# Patient Record
Sex: FEMALE | Race: BLACK OR AFRICAN AMERICAN | Employment: UNEMPLOYED
[De-identification: names, ages, dates, MRNs, and addresses within clinical notes are randomized per-mention and may not be internally consistent; named-entity substitution may affect disease eponyms.]

---

## 2017-01-16 ENCOUNTER — OFFICE VISIT (OUTPATIENT)
Dept: INTERNAL MEDICINE | Facility: CLINIC | Age: 37
End: 2017-01-16

## 2017-01-16 VITALS
DIASTOLIC BLOOD PRESSURE: 70 MMHG | WEIGHT: 106 LBS | HEART RATE: 55 BPM | SYSTOLIC BLOOD PRESSURE: 103 MMHG | HEIGHT: 62 IN | BODY MASS INDEX: 19.51 KG/M2

## 2017-01-16 DIAGNOSIS — K21.9 GASTROESOPHAGEAL REFLUX DISEASE WITHOUT ESOPHAGITIS: ICD-10-CM

## 2017-01-16 DIAGNOSIS — Z23 NEED FOR PROPHYLACTIC VACCINATION AGAINST STREPTOCOCCUS PNEUMONIAE (PNEUMOCOCCUS): ICD-10-CM

## 2017-01-16 DIAGNOSIS — K58.9 IRRITABLE BOWEL SYNDROME WITHOUT DIARRHEA: ICD-10-CM

## 2017-01-16 DIAGNOSIS — Z23 NEED FOR PROPHYLACTIC VACCINATION AND INOCULATION AGAINST INFLUENZA: ICD-10-CM

## 2017-01-16 DIAGNOSIS — R56.01 COMPLEX FEBRILE CONVULSION (HCC): Primary | ICD-10-CM

## 2017-01-16 PROCEDURE — G0009 ADMIN PNEUMOCOCCAL VACCINE: HCPCS | Performed by: INTERNAL MEDICINE

## 2017-01-16 PROCEDURE — G0008 ADMIN INFLUENZA VIRUS VAC: HCPCS | Performed by: INTERNAL MEDICINE

## 2017-01-16 PROCEDURE — 99214 OFFICE O/P EST MOD 30 MIN: CPT | Performed by: INTERNAL MEDICINE

## 2017-01-16 PROCEDURE — 90732 PPSV23 VACC 2 YRS+ SUBQ/IM: CPT | Performed by: INTERNAL MEDICINE

## 2017-01-16 PROCEDURE — 90688 IIV4 VACCINE SPLT 0.5 ML IM: CPT | Performed by: INTERNAL MEDICINE

## 2017-01-16 RX ORDER — LEVETIRACETAM 750 MG/1
750 TABLET ORAL 2 TIMES DAILY
Qty: 60 TABLET | Refills: 2 | Status: SHIPPED | OUTPATIENT
Start: 2017-01-16 | End: 2017-04-17 | Stop reason: SDUPTHER

## 2017-01-16 RX ORDER — FAMOTIDINE 20 MG/1
20 TABLET, FILM COATED ORAL 2 TIMES DAILY
Qty: 60 TABLET | Refills: 2 | Status: SHIPPED | OUTPATIENT
Start: 2017-01-16 | End: 2017-04-17 | Stop reason: SDUPTHER

## 2017-01-16 RX ORDER — DICYCLOMINE HCL 20 MG
20 TABLET ORAL EVERY 6 HOURS
Qty: 120 TABLET | Refills: 2 | Status: SHIPPED | OUTPATIENT
Start: 2017-01-16 | End: 2017-04-17 | Stop reason: SDUPTHER

## 2017-04-17 ENCOUNTER — OFFICE VISIT (OUTPATIENT)
Dept: INTERNAL MEDICINE | Age: 37
End: 2017-04-17
Payer: COMMERCIAL

## 2017-04-17 VITALS
HEIGHT: 62 IN | HEART RATE: 60 BPM | DIASTOLIC BLOOD PRESSURE: 74 MMHG | BODY MASS INDEX: 19.43 KG/M2 | SYSTOLIC BLOOD PRESSURE: 107 MMHG | WEIGHT: 105.6 LBS

## 2017-04-17 DIAGNOSIS — K21.9 GASTROESOPHAGEAL REFLUX DISEASE WITHOUT ESOPHAGITIS: ICD-10-CM

## 2017-04-17 DIAGNOSIS — K58.9 IRRITABLE BOWEL SYNDROME WITHOUT DIARRHEA: ICD-10-CM

## 2017-04-17 DIAGNOSIS — R56.01 COMPLEX FEBRILE CONVULSION (HCC): ICD-10-CM

## 2017-04-17 PROCEDURE — 99214 OFFICE O/P EST MOD 30 MIN: CPT | Performed by: INTERNAL MEDICINE

## 2017-04-17 RX ORDER — FAMOTIDINE 20 MG/1
20 TABLET, FILM COATED ORAL 2 TIMES DAILY
Qty: 60 TABLET | Refills: 2 | Status: SHIPPED | OUTPATIENT
Start: 2017-04-17 | End: 2017-07-17 | Stop reason: SDUPTHER

## 2017-04-17 RX ORDER — LEVETIRACETAM 750 MG/1
750 TABLET ORAL 2 TIMES DAILY
Qty: 60 TABLET | Refills: 2 | Status: SHIPPED | OUTPATIENT
Start: 2017-04-17 | End: 2017-07-17 | Stop reason: SDUPTHER

## 2017-04-17 RX ORDER — DICYCLOMINE HCL 20 MG
20 TABLET ORAL EVERY 6 HOURS
Qty: 120 TABLET | Refills: 2 | Status: SHIPPED | OUTPATIENT
Start: 2017-04-17 | End: 2017-07-17 | Stop reason: ALTCHOICE

## 2017-04-17 ASSESSMENT — PATIENT HEALTH QUESTIONNAIRE - PHQ9
SUM OF ALL RESPONSES TO PHQ QUESTIONS 1-9: 1
2. FEELING DOWN, DEPRESSED OR HOPELESS: 1
SUM OF ALL RESPONSES TO PHQ9 QUESTIONS 1 & 2: 1
1. LITTLE INTEREST OR PLEASURE IN DOING THINGS: 0

## 2017-07-17 ENCOUNTER — OFFICE VISIT (OUTPATIENT)
Dept: INTERNAL MEDICINE | Age: 37
End: 2017-07-17
Payer: COMMERCIAL

## 2017-07-17 ENCOUNTER — HOSPITAL ENCOUNTER (OUTPATIENT)
Age: 37
Setting detail: SPECIMEN
Discharge: HOME | End: 2017-07-17
Payer: COMMERCIAL

## 2017-07-17 VITALS
DIASTOLIC BLOOD PRESSURE: 73 MMHG | SYSTOLIC BLOOD PRESSURE: 117 MMHG | HEART RATE: 69 BPM | WEIGHT: 108 LBS | BODY MASS INDEX: 19.88 KG/M2 | HEIGHT: 62 IN

## 2017-07-17 DIAGNOSIS — K21.9 GASTROESOPHAGEAL REFLUX DISEASE WITHOUT ESOPHAGITIS: ICD-10-CM

## 2017-07-17 DIAGNOSIS — Z11.4 SCREENING FOR HIV WITHOUT PRESENCE OF RISK FACTORS: ICD-10-CM

## 2017-07-17 DIAGNOSIS — R56.01 COMPLEX FEBRILE CONVULSION (HCC): Primary | ICD-10-CM

## 2017-07-17 DIAGNOSIS — E55.9 VITAMIN D DEFICIENCY: ICD-10-CM

## 2017-07-17 LAB — HIV AG/AB: NONREACTIVE

## 2017-07-17 PROCEDURE — 36415 COLL VENOUS BLD VENIPUNCTURE: CPT

## 2017-07-17 PROCEDURE — 99214 OFFICE O/P EST MOD 30 MIN: CPT | Performed by: INTERNAL MEDICINE

## 2017-07-17 PROCEDURE — 87389 HIV-1 AG W/HIV-1&-2 AB AG IA: CPT

## 2017-07-17 RX ORDER — LEVETIRACETAM 750 MG/1
750 TABLET ORAL 2 TIMES DAILY
Qty: 60 TABLET | Refills: 2 | Status: SHIPPED | OUTPATIENT
Start: 2017-07-17 | End: 2017-10-16 | Stop reason: SDUPTHER

## 2017-07-17 RX ORDER — FAMOTIDINE 20 MG/1
20 TABLET, FILM COATED ORAL 2 TIMES DAILY
Qty: 60 TABLET | Refills: 2 | Status: SHIPPED | OUTPATIENT
Start: 2017-07-17 | End: 2017-10-16 | Stop reason: SDUPTHER

## 2017-07-17 RX ORDER — METHANOL
1 LIQUID (ML) MISCELLANEOUS DAILY
Qty: 30 TABLET | Refills: 2 | Status: SHIPPED | OUTPATIENT
Start: 2017-07-17 | End: 2017-10-16 | Stop reason: SDUPTHER

## 2017-10-16 ENCOUNTER — OFFICE VISIT (OUTPATIENT)
Dept: INTERNAL MEDICINE | Age: 37
End: 2017-10-16
Payer: COMMERCIAL

## 2017-10-16 VITALS
HEIGHT: 62 IN | DIASTOLIC BLOOD PRESSURE: 71 MMHG | HEART RATE: 64 BPM | WEIGHT: 111.6 LBS | SYSTOLIC BLOOD PRESSURE: 115 MMHG | BODY MASS INDEX: 20.54 KG/M2

## 2017-10-16 DIAGNOSIS — R56.01 COMPLEX FEBRILE CONVULSION (HCC): Primary | ICD-10-CM

## 2017-10-16 DIAGNOSIS — E55.9 VITAMIN D DEFICIENCY: ICD-10-CM

## 2017-10-16 DIAGNOSIS — Z23 NEED FOR INFLUENZA VACCINATION: ICD-10-CM

## 2017-10-16 DIAGNOSIS — K21.9 GASTROESOPHAGEAL REFLUX DISEASE WITHOUT ESOPHAGITIS: ICD-10-CM

## 2017-10-16 PROCEDURE — 99214 OFFICE O/P EST MOD 30 MIN: CPT | Performed by: INTERNAL MEDICINE

## 2017-10-16 PROCEDURE — G0008 ADMIN INFLUENZA VIRUS VAC: HCPCS | Performed by: INTERNAL MEDICINE

## 2017-10-16 PROCEDURE — 90688 IIV4 VACCINE SPLT 0.5 ML IM: CPT | Performed by: INTERNAL MEDICINE

## 2017-10-16 RX ORDER — METHANOL
1 LIQUID (ML) MISCELLANEOUS DAILY
Qty: 30 TABLET | Refills: 2 | Status: SHIPPED | OUTPATIENT
Start: 2017-10-16 | End: 2018-02-05 | Stop reason: SDUPTHER

## 2017-10-16 RX ORDER — FAMOTIDINE 20 MG/1
20 TABLET, FILM COATED ORAL 2 TIMES DAILY
Qty: 60 TABLET | Refills: 2 | Status: SHIPPED | OUTPATIENT
Start: 2017-10-16 | End: 2018-02-05 | Stop reason: SDUPTHER

## 2017-10-16 RX ORDER — LEVETIRACETAM 750 MG/1
750 TABLET ORAL 2 TIMES DAILY
Qty: 60 TABLET | Refills: 2 | Status: SHIPPED | OUTPATIENT
Start: 2017-10-16 | End: 2018-01-29 | Stop reason: SDUPTHER

## 2017-10-16 ASSESSMENT — ENCOUNTER SYMPTOMS
NAUSEA: 0
HEMOPTYSIS: 0
COUGH: 0
HEARTBURN: 0
ABDOMINAL PAIN: 0
BLURRED VISION: 0
DOUBLE VISION: 0

## 2017-10-16 NOTE — PROGRESS NOTES
Visit Information    Have you changed or started any medications since your last visit including any over-the-counter medicines, vitamins, or herbal medicines? no   Have you stopped taking any of your medications? Is so, why? -  no  Are you having any side effects from any of your medications? - no    Have you seen any other physician or provider since your last visit?  no   Have you had any other diagnostic tests since your last visit? Yes- labs   Have you been seen in the emergency room and/or had an admission in a hospital since we last saw you?  no   Have you had your routine dental cleaning in the past 6 months?  no     Do you have an active MyChart account? If no, what is the barrier?   No:     Patient Care Team:  Edu Saldaña MD as PCP - General    Medical History Review  Past Medical, Family, and Social History reviewed and does contribute to the patient presenting condition    Health Maintenance   Topic Date Due    Cervical cancer screen  03/07/2017    Flu vaccine (1) 09/01/2017    DTaP/Tdap/Td vaccine (2 - Td) 09/13/2026    HIV screen  Completed

## 2017-10-16 NOTE — PROGRESS NOTES
DIAGNOSTIC FINDINGS:  CBC:  Lab Results   Component Value Date    WBC 5.4 09/07/2016    HGB 13.2 09/07/2016     09/07/2016     03/21/2012       BMP:    Lab Results   Component Value Date     09/07/2016    K 4.1 09/07/2016     09/07/2016    CO2 25 09/07/2016    BUN 15 09/07/2016    CREATININE 0.82 09/07/2016    GLUCOSE 91 09/07/2016    GLUCOSE 118 03/21/2012       HEMOGLOBIN A1C: No results found for: LABA1C    FASTING LIPID PANEL:No results found for: CHOL, HDL, TRIG    ASSESSMENT AND PLAN:  Scarlet was seen today for 3 month follow-up and seizures. Diagnoses and all orders for this visit:    Complex febrile convulsion ([address]  -     levETIRAcetam (KEPPRA) 750 MG tablet; Take 1 tablet by mouth 2 times daily    Gastroesophageal reflux disease without esophagitis  -     famotidine (PEPCID) 20 MG tablet; Take 1 tablet by mouth 2 times daily    Vitamin D deficiency  -     GNP VITAMIN D 1000 units TABS tablet; Take 1 tablet by mouth daily    Need for influenza vaccination  -     INFLUENZA, QUADV, 3 YRS AND OLDER, IM, MDV, 0.5ML (FLUZONE QUADV)  -     [place] ADMIN INFLUENZA VIRUS VAC      FOLLOW UP AND INSTRUCTIONS:  · Return in about 3 months (around 1/16/2018) for Screening PAP. · Scarlet received counseling on the following healthy behaviors: nutrition and exercise    · Discussed use, benefit, and side effects of prescribed medications. Barriers to medication compliance addressed. All patient questions answered. Pt voiced understanding.      · Patient given educational materials - see patient instructions    Edu Snaders MD, RAHEL, [address] [place] [address] Internal Medicine Associate  10/16/2017, 4:39 PM    This note is created with the assistance of a speech-recognition program. While intending to generate a document that actually reflects the content of the visit, the document can still have some mistakes which may not have been identified and corrected by editing.

## 2018-01-29 DIAGNOSIS — R56.01 COMPLEX FEBRILE CONVULSION (HCC): ICD-10-CM

## 2018-01-29 RX ORDER — LEVETIRACETAM 750 MG/1
TABLET ORAL
Qty: 60 TABLET | Refills: 0 | Status: SHIPPED | OUTPATIENT
Start: 2018-01-29 | End: 2018-02-05 | Stop reason: SDUPTHER

## 2018-02-05 ENCOUNTER — OFFICE VISIT (OUTPATIENT)
Dept: INTERNAL MEDICINE | Age: 38
End: 2018-02-05
Payer: COMMERCIAL

## 2018-02-05 ENCOUNTER — HOSPITAL ENCOUNTER (OUTPATIENT)
Age: 38
Setting detail: SPECIMEN
Discharge: HOME | End: 2018-02-05
Payer: COMMERCIAL

## 2018-02-05 VITALS
WEIGHT: 110 LBS | SYSTOLIC BLOOD PRESSURE: 118 MMHG | BODY MASS INDEX: 20.24 KG/M2 | HEIGHT: 62 IN | DIASTOLIC BLOOD PRESSURE: 68 MMHG | HEART RATE: 74 BPM

## 2018-02-05 DIAGNOSIS — N76.0 BV (BACTERIAL VAGINOSIS): ICD-10-CM

## 2018-02-05 DIAGNOSIS — Z12.4 CERVICAL CANCER SCREENING: ICD-10-CM

## 2018-02-05 DIAGNOSIS — E55.9 VITAMIN D DEFICIENCY: ICD-10-CM

## 2018-02-05 DIAGNOSIS — B96.89 BV (BACTERIAL VAGINOSIS): ICD-10-CM

## 2018-02-05 DIAGNOSIS — K21.9 GASTROESOPHAGEAL REFLUX DISEASE WITHOUT ESOPHAGITIS: ICD-10-CM

## 2018-02-05 DIAGNOSIS — R56.01 COMPLEX FEBRILE CONVULSION (HCC): Primary | ICD-10-CM

## 2018-02-05 PROCEDURE — 99214 OFFICE O/P EST MOD 30 MIN: CPT | Performed by: INTERNAL MEDICINE

## 2018-02-05 PROCEDURE — G8484 FLU IMMUNIZE NO ADMIN: HCPCS | Performed by: INTERNAL MEDICINE

## 2018-02-05 PROCEDURE — G8427 DOCREV CUR MEDS BY ELIG CLIN: HCPCS | Performed by: INTERNAL MEDICINE

## 2018-02-05 PROCEDURE — G8420 CALC BMI NORM PARAMETERS: HCPCS | Performed by: INTERNAL MEDICINE

## 2018-02-05 PROCEDURE — 1036F TOBACCO NON-USER: CPT | Performed by: INTERNAL MEDICINE

## 2018-02-05 RX ORDER — METHANOL
1 LIQUID (ML) MISCELLANEOUS DAILY
Qty: 30 TABLET | Refills: 2 | Status: SHIPPED | OUTPATIENT
Start: 2018-02-05 | End: 2018-04-30 | Stop reason: SDUPTHER

## 2018-02-05 RX ORDER — LEVETIRACETAM 750 MG/1
750 TABLET ORAL 2 TIMES DAILY
Qty: 60 TABLET | Refills: 2 | Status: SHIPPED | OUTPATIENT
Start: 2018-02-05 | End: 2018-04-30 | Stop reason: SDUPTHER

## 2018-02-05 RX ORDER — METRONIDAZOLE 500 MG/1
500 TABLET ORAL 2 TIMES DAILY
Qty: 14 TABLET | Refills: 0 | Status: SHIPPED | OUTPATIENT
Start: 2018-02-05 | End: 2018-02-12

## 2018-02-05 RX ORDER — FAMOTIDINE 20 MG/1
20 TABLET, FILM COATED ORAL 2 TIMES DAILY
Qty: 60 TABLET | Refills: 2 | Status: SHIPPED | OUTPATIENT
Start: 2018-02-05 | End: 2018-04-30 | Stop reason: SDUPTHER

## 2018-02-05 ASSESSMENT — ENCOUNTER SYMPTOMS
ABDOMINAL PAIN: 0
COUGH: 0
HEMOPTYSIS: 0
HEARTBURN: 0
DOUBLE VISION: 0
NAUSEA: 0
BLURRED VISION: 0

## 2018-02-05 NOTE — PROGRESS NOTES
Skin: Skin is warm. No rash noted. She is not diaphoretic. No erythema. Psychiatric: Mood, memory, affect and judgment normal.         DIAGNOSTIC FINDINGS:  CBC:  Lab Results   Component Value Date    WBC 5.4 09/07/2016    HGB 13.2 09/07/2016     09/07/2016     03/21/2012       BMP:    Lab Results   Component Value Date     09/07/2016    K 4.1 09/07/2016     09/07/2016    CO2 25 09/07/2016    BUN 15 09/07/2016    CREATININE 0.82 09/07/2016    GLUCOSE 91 09/07/2016    GLUCOSE 118 03/21/2012       ASSESSMENT AND PLAN:  April Strickland was seen today for gynecologic exam.    Diagnoses and all orders for this visit:    Complex febrile convulsion ([address]  -     levETIRAcetam (KEPPRA) 750 MG tablet; Take 1 tablet by mouth 2 times daily    Gastroesophageal reflux disease without esophagitis  -     famotidine (PEPCID) 20 MG tablet; Take 1 tablet by mouth 2 times daily    Vitamin D deficiency  -     GNP VITAMIN D 1000 units TABS tablet; Take 1 tablet by mouth daily    Cervical cancer screening  -     PAP Smear    BV (bacterial vaginosis)  -     metroNIDAZOLE (FLAGYL) 500 MG tablet; Take 1 tablet by mouth 2 times daily for 7 days      FOLLOW UP AND INSTRUCTIONS:  · No Follow-up on file. · [place] received counseling on the following healthy behaviors: nutrition and exercise    · Discussed use, benefit, and side effects of prescribed medications. Barriers to medication compliance addressed. All patient questions answered. Pt voiced understanding.      · Patient given educational materials - see patient instructions    Edu Grant MD, RAHEL, [address] [place] [address] Internal Medicine Associate  2/5/2018, 5:00 PM    This note is created with the assistance of a speech-recognition program. While intending to generate a document that actually reflects the content of the visit, the document can still have some mistakes which may not have been identified and corrected by editing.

## 2018-02-14 LAB — CYTOLOGY REPORT: NORMAL

## 2018-04-30 ENCOUNTER — OFFICE VISIT (OUTPATIENT)
Dept: INTERNAL MEDICINE | Age: 38
End: 2018-04-30
Payer: COMMERCIAL

## 2018-04-30 VITALS
DIASTOLIC BLOOD PRESSURE: 78 MMHG | BODY MASS INDEX: 19.95 KG/M2 | HEIGHT: 62 IN | SYSTOLIC BLOOD PRESSURE: 120 MMHG | WEIGHT: 108.4 LBS | HEART RATE: 86 BPM

## 2018-04-30 DIAGNOSIS — E55.9 VITAMIN D DEFICIENCY: ICD-10-CM

## 2018-04-30 DIAGNOSIS — K21.9 GASTROESOPHAGEAL REFLUX DISEASE WITHOUT ESOPHAGITIS: ICD-10-CM

## 2018-04-30 DIAGNOSIS — R56.01 COMPLEX FEBRILE CONVULSION (HCC): Primary | ICD-10-CM

## 2018-04-30 PROCEDURE — G8427 DOCREV CUR MEDS BY ELIG CLIN: HCPCS | Performed by: INTERNAL MEDICINE

## 2018-04-30 PROCEDURE — 99214 OFFICE O/P EST MOD 30 MIN: CPT | Performed by: INTERNAL MEDICINE

## 2018-04-30 PROCEDURE — 1036F TOBACCO NON-USER: CPT | Performed by: INTERNAL MEDICINE

## 2018-04-30 PROCEDURE — G8420 CALC BMI NORM PARAMETERS: HCPCS | Performed by: INTERNAL MEDICINE

## 2018-04-30 PROCEDURE — 99213 OFFICE O/P EST LOW 20 MIN: CPT

## 2018-04-30 RX ORDER — METRONIDAZOLE 500 MG/1
500 TABLET ORAL 2 TIMES DAILY
COMMUNITY
End: 2018-04-30

## 2018-04-30 RX ORDER — METHANOL
1 LIQUID (ML) MISCELLANEOUS DAILY
Qty: 30 TABLET | Refills: 2 | Status: SHIPPED | OUTPATIENT
Start: 2018-04-30 | End: 2018-08-29 | Stop reason: SDUPTHER

## 2018-04-30 RX ORDER — FAMOTIDINE 20 MG/1
20 TABLET, FILM COATED ORAL 2 TIMES DAILY
Qty: 60 TABLET | Refills: 2 | Status: SHIPPED | OUTPATIENT
Start: 2018-04-30 | End: 2018-07-30 | Stop reason: SDUPTHER

## 2018-04-30 RX ORDER — LEVETIRACETAM 750 MG/1
750 TABLET ORAL 2 TIMES DAILY
Qty: 60 TABLET | Refills: 2 | Status: SHIPPED | OUTPATIENT
Start: 2018-04-30 | End: 2018-07-30 | Stop reason: SDUPTHER

## 2018-04-30 RX ORDER — METRONIDAZOLE 500 MG/1
500 TABLET ORAL 2 TIMES DAILY
Status: CANCELLED | OUTPATIENT
Start: 2018-04-30

## 2018-04-30 ASSESSMENT — ENCOUNTER SYMPTOMS
NAUSEA: 0
HEMOPTYSIS: 0
COUGH: 0
HEARTBURN: 0
DOUBLE VISION: 0
ABDOMINAL PAIN: 0
BLURRED VISION: 0

## 2018-04-30 ASSESSMENT — PATIENT HEALTH QUESTIONNAIRE - PHQ9
SUM OF ALL RESPONSES TO PHQ9 QUESTIONS 1 & 2: 0
1. LITTLE INTEREST OR PLEASURE IN DOING THINGS: 0
SUM OF ALL RESPONSES TO PHQ QUESTIONS 1-9: 0
2. FEELING DOWN, DEPRESSED OR HOPELESS: 0

## 2018-07-30 DIAGNOSIS — K21.9 GASTROESOPHAGEAL REFLUX DISEASE WITHOUT ESOPHAGITIS: ICD-10-CM

## 2018-07-30 DIAGNOSIS — R56.01 COMPLEX FEBRILE CONVULSION (HCC): ICD-10-CM

## 2018-07-30 RX ORDER — FAMOTIDINE 20 MG/1
TABLET, FILM COATED ORAL
Qty: 60 TABLET | Refills: 0 | Status: CANCELLED | OUTPATIENT
Start: 2018-07-30

## 2018-07-30 RX ORDER — FAMOTIDINE 20 MG/1
20 TABLET, FILM COATED ORAL 2 TIMES DAILY
Qty: 60 TABLET | Refills: 2 | Status: SHIPPED | OUTPATIENT
Start: 2018-07-30 | End: 2018-08-29 | Stop reason: SDUPTHER

## 2018-07-30 RX ORDER — LEVETIRACETAM 750 MG/1
TABLET ORAL
Qty: 60 TABLET | Refills: 0 | Status: SHIPPED | OUTPATIENT
Start: 2018-07-30 | End: 2018-08-29 | Stop reason: SDUPTHER

## 2018-07-30 NOTE — TELEPHONE ENCOUNTER
Refill request for pepcid and keppra    Next Visit Date:  Future Appointments  Date Time Provider Rachel Adkins   8/29/2018 2:15 PM Edu Wilson MD [facility] Maintenance   Topic Date Due    Flu vaccine (1) 09/01/2018    Cervical cancer screen  02/05/2021    DTaP/Tdap/Td vaccine (2 - Td) 09/13/2026    HIV screen  Completed       No results found for: LABA1C          ( goal A1C is < 7)   No results found for: LABMICR  No results found for: LDLCHOLESTEROL, LDLCALC    (goal LDL is <100)   BUN (mg/dL)   Date Value   09/07/2016 15     BP Readings from Last 3 Encounters:   04/30/18 120/78   02/05/18 118/68   10/16/17 115/71          (goal 120/80)    All Future Testing planned in [facility]              Patient Active Problem List:     GERD (gastroesophageal reflux disease)     Mental retardation     Seizure ([address]     Irritable bowel syndrome without diarrhea

## 2018-08-29 ENCOUNTER — OFFICE VISIT (OUTPATIENT)
Dept: INTERNAL MEDICINE | Age: 38
End: 2018-08-29
Payer: COMMERCIAL

## 2018-08-29 VITALS
DIASTOLIC BLOOD PRESSURE: 61 MMHG | BODY MASS INDEX: 20.35 KG/M2 | SYSTOLIC BLOOD PRESSURE: 91 MMHG | HEART RATE: 82 BPM | HEIGHT: 62 IN | WEIGHT: 110.6 LBS

## 2018-08-29 DIAGNOSIS — R56.01 COMPLEX FEBRILE CONVULSION (HCC): Primary | ICD-10-CM

## 2018-08-29 DIAGNOSIS — K21.9 GASTROESOPHAGEAL REFLUX DISEASE WITHOUT ESOPHAGITIS: ICD-10-CM

## 2018-08-29 DIAGNOSIS — E55.9 VITAMIN D DEFICIENCY: ICD-10-CM

## 2018-08-29 PROCEDURE — 99212 OFFICE O/P EST SF 10 MIN: CPT | Performed by: INTERNAL MEDICINE

## 2018-08-29 PROCEDURE — 1036F TOBACCO NON-USER: CPT | Performed by: INTERNAL MEDICINE

## 2018-08-29 PROCEDURE — 99214 OFFICE O/P EST MOD 30 MIN: CPT | Performed by: INTERNAL MEDICINE

## 2018-08-29 PROCEDURE — G8420 CALC BMI NORM PARAMETERS: HCPCS | Performed by: INTERNAL MEDICINE

## 2018-08-29 PROCEDURE — G8427 DOCREV CUR MEDS BY ELIG CLIN: HCPCS | Performed by: INTERNAL MEDICINE

## 2018-08-29 RX ORDER — METHANOL
1 LIQUID (ML) MISCELLANEOUS DAILY
Qty: 30 TABLET | Refills: 5 | Status: SHIPPED | OUTPATIENT
Start: 2018-08-29 | End: 2019-02-25 | Stop reason: SDUPTHER

## 2018-08-29 RX ORDER — FAMOTIDINE 20 MG/1
20 TABLET, FILM COATED ORAL 2 TIMES DAILY
Qty: 60 TABLET | Refills: 5 | Status: SHIPPED | OUTPATIENT
Start: 2018-08-29 | End: 2019-05-30 | Stop reason: SDUPTHER

## 2018-08-29 RX ORDER — LEVETIRACETAM 750 MG/1
750 TABLET ORAL 2 TIMES DAILY
Qty: 60 TABLET | Refills: 5 | Status: SHIPPED | OUTPATIENT
Start: 2018-08-29 | End: 2019-02-25 | Stop reason: SDUPTHER

## 2018-08-29 ASSESSMENT — ENCOUNTER SYMPTOMS
ABDOMINAL PAIN: 0
NAUSEA: 0
HEARTBURN: 0
HEMOPTYSIS: 0
COUGH: 0
DOUBLE VISION: 0
BLURRED VISION: 0

## 2018-08-29 NOTE — PROGRESS NOTES
Family History   Problem Relation Age of Onset    High Cholesterol Mother     Birth Defects Neg Hx     Diabetes Neg Hx         REVIEW OF SYSTEMS:  Review of Systems   Constitutional: Negative for chills and fever. HENT: Negative for congestion, ear discharge and nosebleeds. Eyes: Negative for blurred vision and double vision. Respiratory: Negative for cough and hemoptysis. Cardiovascular: Negative for chest pain and palpitations. Gastrointestinal: Negative for abdominal pain, heartburn and nausea. Genitourinary: Negative for dysuria and urgency. Musculoskeletal: Negative for myalgias and neck pain. Neurological: Negative for dizziness and headaches. Endo/Heme/Allergies: Does not bruise/bleed easily. Psychiatric/Behavioral: Negative for depression and suicidal ideas. PHYSICAL EXAM:  Vitals:    08/29/18 1418   BP: 91/61   Site: Left Arm   Position: Sitting   Cuff Size: Medium Adult   Pulse: 82   Weight: 110 lb 9.6 oz (50.2 kg)   Height: 5' 2\" (1.575 m)     BP Readings from Last 3 Encounters:   08/29/18 91/61   04/30/18 120/78   02/05/18 118/68        Physical Exam   Constitutional: She is oriented to person, place, and time and well-developed, well-nourished, and in no distress. No distress. HENT:   Mouth/Throat: No oropharyngeal exudate. Neck: Normal range of motion. Neck supple. No thyromegaly present. Cardiovascular: Normal rate, regular rhythm, normal heart sounds and intact distal pulses. Pulmonary/Chest: Effort normal and breath sounds normal. No respiratory distress. She has no wheezes. Abdominal: Soft. Bowel sounds are normal. She exhibits no distension and no mass. There is no tenderness. Musculoskeletal: Normal range of motion. She exhibits no edema or tenderness. Neurological: She is alert and oriented to person, place, and time. No cranial nerve deficit. Skin: Skin is warm. No rash noted. She is not diaphoretic. No erythema.    Psychiatric: Mood, memory,

## 2019-02-25 DIAGNOSIS — E55.9 VITAMIN D DEFICIENCY: ICD-10-CM

## 2019-02-25 DIAGNOSIS — R56.01 COMPLEX FEBRILE CONVULSION (HCC): ICD-10-CM

## 2019-02-25 RX ORDER — MELATONIN
1 DAILY
Qty: 30 TABLET | Refills: 0 | Status: SHIPPED | OUTPATIENT
Start: 2019-02-25 | End: 2019-03-22 | Stop reason: SDUPTHER

## 2019-02-25 RX ORDER — LEVETIRACETAM 750 MG/1
750 TABLET ORAL 2 TIMES DAILY
Qty: 60 TABLET | Refills: 0 | Status: SHIPPED | OUTPATIENT
Start: 2019-02-25 | End: 2019-03-22 | Stop reason: SDUPTHER

## 2019-03-22 DIAGNOSIS — E55.9 VITAMIN D DEFICIENCY: ICD-10-CM

## 2019-03-22 DIAGNOSIS — R56.01 COMPLEX FEBRILE CONVULSION (HCC): ICD-10-CM

## 2019-03-23 RX ORDER — MELATONIN
1 DAILY
Qty: 30 TABLET | Refills: 0 | Status: SHIPPED | OUTPATIENT
Start: 2019-03-23 | End: 2019-04-27 | Stop reason: SDUPTHER

## 2019-03-23 RX ORDER — LEVETIRACETAM 750 MG/1
750 TABLET ORAL 2 TIMES DAILY
Qty: 60 TABLET | Refills: 0 | Status: SHIPPED | OUTPATIENT
Start: 2019-03-23 | End: 2019-04-27 | Stop reason: SDUPTHER

## 2019-04-27 DIAGNOSIS — R56.01 COMPLEX FEBRILE CONVULSION (HCC): ICD-10-CM

## 2019-04-27 DIAGNOSIS — E55.9 VITAMIN D DEFICIENCY: ICD-10-CM

## 2019-04-29 RX ORDER — MELATONIN
1 DAILY
Qty: 30 TABLET | Refills: 0 | Status: SHIPPED | OUTPATIENT
Start: 2019-04-29 | End: 2019-05-30 | Stop reason: SDUPTHER

## 2019-04-29 RX ORDER — LEVETIRACETAM 750 MG/1
750 TABLET ORAL 2 TIMES DAILY
Qty: 60 TABLET | Refills: 0 | Status: SHIPPED | OUTPATIENT
Start: 2019-04-29 | End: 2019-04-30 | Stop reason: SDUPTHER

## 2019-04-29 NOTE — TELEPHONE ENCOUNTER
Saulo request for vitamin D, Levetiracetam    Next Visit Date:  Future Appointments   Date Time Provider Rachel Wagneri   5/30/2019  1:00 PM Edu Walton MD [facility] IM [address] Maintenance   Topic Date Due    Varicella Vaccine (1 of 2 - 13+ 2-dose series) 02/27/1995    Flu vaccine (Season Ended) 09/01/2019    Cervical cancer screen  02/05/2021    DTaP/Tdap/Td vaccine (2 - Td) 09/13/2026    HIV screen  Completed    Pneumococcal 0-64 years Vaccine  Aged Out             (applicable per patient's age: Cancer Screenings, Depression Screening, Fall Risk Screening, Immunizations)    BUN (mg/dL)   Date Value   09/07/2016 15      (goal A1C is < 7)   (goal LDL is <100) need 30-50% reduction from baseline     BP Readings from Last 3 Encounters:   08/29/18 91/61   04/30/18 120/78   02/05/18 118/68    (goal /80)      All Future Testing planned in [facility]:           Patient Active Problem List:     GERD (gastroesophageal reflux disease)     Mental retardation     Seizure (HCC)     Irritable bowel syndrome without diarrhea

## 2019-04-30 DIAGNOSIS — R56.01 COMPLEX FEBRILE CONVULSION (HCC): ICD-10-CM

## 2019-04-30 RX ORDER — LEVETIRACETAM 750 MG/1
750 TABLET ORAL 2 TIMES DAILY
Qty: 60 TABLET | Refills: 0 | Status: SHIPPED | OUTPATIENT
Start: 2019-04-30 | End: 2019-05-30 | Stop reason: SDUPTHER

## 2019-05-30 ENCOUNTER — OFFICE VISIT (OUTPATIENT)
Dept: INTERNAL MEDICINE | Age: 39
End: 2019-05-30
Payer: COMMERCIAL

## 2019-05-30 VITALS
HEIGHT: 62 IN | BODY MASS INDEX: 19.91 KG/M2 | DIASTOLIC BLOOD PRESSURE: 89 MMHG | WEIGHT: 108.2 LBS | SYSTOLIC BLOOD PRESSURE: 122 MMHG | HEART RATE: 89 BPM

## 2019-05-30 DIAGNOSIS — J20.2 ACUTE BRONCHITIS DUE TO STREPTOCOCCUS: Primary | ICD-10-CM

## 2019-05-30 DIAGNOSIS — E55.9 VITAMIN D DEFICIENCY: ICD-10-CM

## 2019-05-30 DIAGNOSIS — K21.9 GASTROESOPHAGEAL REFLUX DISEASE WITHOUT ESOPHAGITIS: ICD-10-CM

## 2019-05-30 DIAGNOSIS — R56.01 COMPLEX FEBRILE CONVULSION (HCC): ICD-10-CM

## 2019-05-30 PROCEDURE — G8427 DOCREV CUR MEDS BY ELIG CLIN: HCPCS | Performed by: INTERNAL MEDICINE

## 2019-05-30 PROCEDURE — 99214 OFFICE O/P EST MOD 30 MIN: CPT | Performed by: INTERNAL MEDICINE

## 2019-05-30 PROCEDURE — G8420 CALC BMI NORM PARAMETERS: HCPCS | Performed by: INTERNAL MEDICINE

## 2019-05-30 PROCEDURE — 99211 OFF/OP EST MAY X REQ PHY/QHP: CPT | Performed by: INTERNAL MEDICINE

## 2019-05-30 PROCEDURE — 1036F TOBACCO NON-USER: CPT | Performed by: INTERNAL MEDICINE

## 2019-05-30 RX ORDER — AZITHROMYCIN 250 MG/1
TABLET, FILM COATED ORAL
Qty: 6 TABLET | Refills: 0 | Status: SHIPPED | OUTPATIENT
Start: 2019-05-30 | End: 2019-06-09

## 2019-05-30 RX ORDER — LEVETIRACETAM 750 MG/1
750 TABLET ORAL 2 TIMES DAILY
Qty: 60 TABLET | Refills: 5 | Status: SHIPPED | OUTPATIENT
Start: 2019-05-30 | End: 2019-11-21 | Stop reason: SDUPTHER

## 2019-05-30 RX ORDER — FAMOTIDINE 20 MG/1
20 TABLET, FILM COATED ORAL 2 TIMES DAILY
Qty: 60 TABLET | Refills: 5 | Status: SHIPPED | OUTPATIENT
Start: 2019-05-30 | End: 2019-10-21 | Stop reason: SDUPTHER

## 2019-05-30 RX ORDER — MELATONIN
1 DAILY
Qty: 30 TABLET | Refills: 5 | Status: SHIPPED | OUTPATIENT
Start: 2019-05-30 | End: 2019-10-21 | Stop reason: SDUPTHER

## 2019-05-30 RX ORDER — ALBUTEROL SULFATE 90 UG/1
2 AEROSOL, METERED RESPIRATORY (INHALATION) EVERY 6 HOURS PRN
Qty: 1 INHALER | Refills: 0 | Status: SHIPPED | OUTPATIENT
Start: 2019-05-30 | End: 2019-07-16 | Stop reason: SDUPTHER

## 2019-05-30 ASSESSMENT — ENCOUNTER SYMPTOMS
SORE THROAT: 0
VOMITING: 0
NAUSEA: 0
ABDOMINAL PAIN: 0
COUGH: 0
EYE DISCHARGE: 0
DIARRHEA: 0
WHEEZING: 0
PHOTOPHOBIA: 0
SHORTNESS OF BREATH: 0
COLOR CHANGE: 0
BLOOD IN STOOL: 0
EYE PAIN: 0
CONSTIPATION: 0

## 2019-05-30 ASSESSMENT — PATIENT HEALTH QUESTIONNAIRE - PHQ9
SUM OF ALL RESPONSES TO PHQ QUESTIONS 1-9: 0
1. LITTLE INTEREST OR PLEASURE IN DOING THINGS: 0
2. FEELING DOWN, DEPRESSED OR HOPELESS: 0
SUM OF ALL RESPONSES TO PHQ QUESTIONS 1-9: 0
SUM OF ALL RESPONSES TO PHQ9 QUESTIONS 1 & 2: 0

## 2019-05-30 NOTE — PROGRESS NOTES
Visit Information    Have you changed or started any medications since your last visit including any over-the-counter medicines, vitamins, or herbal medicines? no   Have you stopped taking any of your medications? Is so, why? -  no  Are you having any side effects from any of your medications? - no    Have you seen any other physician or provider since your last visit?  no   Have you had any other diagnostic tests since your last visit?  no   Have you been seen in the emergency room and/or had an admission in a hospital since we last saw you?  no   Have you had your routine dental cleaning in the past 6 months?  no     Do you have an active MyChart account? If no, what is the barrier?   No: inactive     Patient Care Team:  Edu Dowling MD as PCP - General    Medical History Review  Past Medical, Family, and Social History reviewed and does contribute to the patient presenting condition    Health Maintenance   Topic Date Due    Varicella Vaccine (1 of 2 - 13+ 2-dose series) 02/27/1995    Flu vaccine (Season Ended) 09/01/2019    Cervical cancer screen  02/05/2021    DTaP/Tdap/Td vaccine (2 - Td) 09/13/2026    HIV screen  Completed    Pneumococcal 0-64 years Vaccine  Aged Out

## 2019-05-30 NOTE — PROGRESS NOTES
needed for Wheezing 1 Inhaler 0    azithromycin (ZITHROMAX Z-SACHI) 250 MG tablet Take 2 tablets daily on day 1, then 1 tablet daily on days 2-5. 6 tablet 0     No current facility-administered medications for this visit. Social History     Tobacco Use    Smoking status: Former Smoker     Packs/day: 1.00     Years: 10.00     Pack years: 10.00     Types: Cigarettes     Last attempt to quit: 3/7/2014     Years since quittin.2    Smokeless tobacco: Never Used    Tobacco comment: denies current use    Substance Use Topics    Alcohol use: No    Drug use: No     Types: Marijuana     Comment: last used marijuana [id number]       Family History   Problem Relation Age of Onset    High Cholesterol Mother     Birth Defects Neg Hx     Diabetes Neg Hx         REVIEW OF SYSTEMS:  Review of Systems   Constitutional: Negative for fatigue and fever. HENT: Negative for congestion and sore throat. Eyes: Negative for photophobia, pain, discharge and visual disturbance. Respiratory: Negative for cough, shortness of breath and wheezing. Cardiovascular: Negative for chest pain, palpitations and leg swelling. Gastrointestinal: Negative for abdominal pain, blood in stool, constipation, diarrhea, nausea and vomiting. Genitourinary: Negative for dysuria, frequency and urgency. Musculoskeletal: Negative for arthralgias and myalgias. Skin: Negative for color change and rash. Neurological: Negative for dizziness, tremors, seizures, syncope, weakness, numbness and headaches. Psychiatric/Behavioral: Negative for agitation, behavioral problems, confusion, sleep disturbance and suicidal ideas.        PHYSICAL EXAM:  Vitals:    19 1259   BP: 122/89   Site: Right Upper Arm   Position: Sitting   Cuff Size: Medium Adult   Pulse: 89   Weight: 108 lb 3.2 oz (49.1 kg)   Height: 5' 2\" (1.575 m)     BP Readings from Last 3 Encounters:   19 122/89   18 91/61   18 120/78        Physical Exam Constitutional: She is oriented to person, place, and time. No distress. HENT:   Head: Normocephalic and atraumatic. Right Ear: External ear normal.   Left Ear: External ear normal.   Nose: Nose normal.   Mouth/Throat: No oropharyngeal exudate. Eyes: Pupils are equal, round, and reactive to light. EOM are normal.   Neck: Normal range of motion. Neck supple. No thyromegaly present. Cardiovascular: Normal rate, regular rhythm, normal heart sounds and intact distal pulses. Pulmonary/Chest: Effort normal and breath sounds normal. No respiratory distress. She has no wheezes. Abdominal: Soft. Bowel sounds are normal. She exhibits no distension and no mass. There is no tenderness. Musculoskeletal: Normal range of motion. She exhibits no edema or tenderness. Neurological: She is alert and oriented to person, place, and time. No cranial nerve deficit. Skin: Skin is warm. No rash noted. She is not diaphoretic. No erythema. Psychiatric: Judgment normal.         DIAGNOSTIC FINDINGS:  CBC:  Lab Results   Component Value Date    WBC 5.4 09/07/2016    HGB 13.2 09/07/2016     09/07/2016     03/21/2012       BMP:    Lab Results   Component Value Date     09/07/2016    K 4.1 09/07/2016     09/07/2016    CO2 25 09/07/2016    BUN 15 09/07/2016    CREATININE 0.82 09/07/2016    GLUCOSE 91 09/07/2016    GLUCOSE 118 03/21/2012       HEMOGLOBIN A1C: No results found for: LABA1C    FASTING LIPID PANEL:No results found for: CHOL, HDL, TRIG    ASSESSMENT AND PLAN:  Scarlet was seen today for 6 month follow-up, gastroesophageal reflux, seizures and cough. Diagnoses and all orders for this visit:    Acute bronchitis due to Streptococcus  -     albuterol sulfate HFA (PROVENTIL HFA) 108 (90 Base) MCG/ACT inhaler; Inhale 2 puffs into the lungs every 6 hours as needed for Wheezing  -     azithromycin (ZITHROMAX Z-SACHI) 250 MG tablet;  Take 2 tablets daily on day 1, then 1 tablet daily on days 2-5.    Complex febrile convulsion (HCC)  -     levETIRAcetam (KEPPRA) 750 MG tablet; Take 1 tablet by mouth 2 times daily    Gastroesophageal reflux disease without esophagitis  -     famotidine (PEPCID) 20 MG tablet; Take 1 tablet by mouth 2 times daily    Vitamin D deficiency  -     Cholecalciferol (VITAMIN D3) 1000 units TABS; Take 1 tablet by mouth daily      FOLLOW UP AND INSTRUCTIONS:  · Return in about 6 months (around 11/30/2019). · [place] received counseling on the following healthy behaviors: nutrition and exercise    · Discussed use, benefit, and side effects of prescribed medications. Barriers to medication compliance addressed. All patient questions answered. Pt voiced understanding. · Patient given educational materials - see patient instructions    Edu Hannah M.D., F.A.C.P. Internal Medicine  5/30/2019, 1:38 PM    This note is created with the assistance of a speech-recognition program. While intending to generate a document that actually reflects the content of thevisit, the document can still have some mistakes which may not have been identified and corrected by editing.

## 2019-05-30 NOTE — PATIENT INSTRUCTIONS
We will call you to schedule your 6 month follow up appointment. Please return to the clinic as needed. Return To Clinic As Needed. After Visit Summary  given and reviewed. --MA    It is very important for your care that you keep your appointment. If for some reason you are unable to keep your appointment it is equally important that you call our office at [phone] to cancel your appointment and reschedule. Failure to do so may result in your termination from our practice.

## 2019-07-16 DIAGNOSIS — J20.2 ACUTE BRONCHITIS DUE TO STREPTOCOCCUS: ICD-10-CM

## 2019-10-21 DIAGNOSIS — E55.9 VITAMIN D DEFICIENCY: ICD-10-CM

## 2019-10-21 DIAGNOSIS — K21.9 GASTROESOPHAGEAL REFLUX DISEASE WITHOUT ESOPHAGITIS: ICD-10-CM

## 2019-10-22 RX ORDER — FAMOTIDINE 20 MG/1
TABLET, FILM COATED ORAL
Qty: 60 TABLET | Refills: 0 | Status: SHIPPED | OUTPATIENT
Start: 2019-10-22 | End: 2019-11-21 | Stop reason: SDUPTHER

## 2019-10-22 RX ORDER — MELATONIN
Qty: 30 TABLET | Refills: 0 | Status: SHIPPED | OUTPATIENT
Start: 2019-10-22 | End: 2019-11-21 | Stop reason: SDUPTHER

## 2019-11-21 ENCOUNTER — OFFICE VISIT (OUTPATIENT)
Dept: INTERNAL MEDICINE | Age: 39
End: 2019-11-21
Payer: COMMERCIAL

## 2019-11-21 ENCOUNTER — HOSPITAL ENCOUNTER (OUTPATIENT)
Age: 39
Setting detail: SPECIMEN
Discharge: HOME | End: 2019-11-21
Payer: COMMERCIAL

## 2019-11-21 VITALS
DIASTOLIC BLOOD PRESSURE: 88 MMHG | HEIGHT: 62 IN | BODY MASS INDEX: 21.57 KG/M2 | SYSTOLIC BLOOD PRESSURE: 125 MMHG | HEART RATE: 69 BPM | WEIGHT: 117.2 LBS

## 2019-11-21 DIAGNOSIS — J20.2 ACUTE BRONCHITIS DUE TO STREPTOCOCCUS: ICD-10-CM

## 2019-11-21 DIAGNOSIS — Z23 NEED FOR INFLUENZA VACCINATION: ICD-10-CM

## 2019-11-21 DIAGNOSIS — K21.9 GASTROESOPHAGEAL REFLUX DISEASE WITHOUT ESOPHAGITIS: ICD-10-CM

## 2019-11-21 DIAGNOSIS — J45.20 MILD INTERMITTENT REACTIVE AIRWAY DISEASE WITHOUT COMPLICATION: ICD-10-CM

## 2019-11-21 DIAGNOSIS — R56.01 COMPLEX FEBRILE CONVULSION (HCC): ICD-10-CM

## 2019-11-21 DIAGNOSIS — Z00.00 ROUTINE CHECK-UP: Primary | ICD-10-CM

## 2019-11-21 DIAGNOSIS — Z00.00 ROUTINE CHECK-UP: ICD-10-CM

## 2019-11-21 DIAGNOSIS — E55.9 VITAMIN D DEFICIENCY: ICD-10-CM

## 2019-11-21 LAB
ALBUMIN SERPL-MCNC: 4.8 G/DL (ref 3.5–5.2)
ALBUMIN/GLOBULIN RATIO: 1.5 (ref 1–2.5)
ALP BLD-CCNC: 51 U/L (ref 35–104)
ALT SERPL-CCNC: 10 U/L (ref 5–33)
ANION GAP SERPL CALCULATED.3IONS-SCNC: 20 MMOL/L (ref 9–17)
AST SERPL-CCNC: 17 U/L
BILIRUB SERPL-MCNC: 0.28 MG/DL (ref 0.3–1.2)
BILIRUBIN DIRECT: <0.08 MG/DL
BILIRUBIN, INDIRECT: ABNORMAL MG/DL (ref 0–1)
BUN BLDV-MCNC: 10 MG/DL (ref 6–20)
BUN/CREAT BLD: ABNORMAL (ref 9–20)
CALCIUM SERPL-MCNC: 9.8 MG/DL (ref 8.6–10.4)
CHLORIDE BLD-SCNC: 102 MMOL/L (ref 98–107)
CO2: 18 MMOL/L (ref 20–31)
CREAT SERPL-MCNC: 0.74 MG/DL (ref 0.5–0.9)
GFR AFRICAN AMERICAN: >60 ML/MIN
GFR NON-AFRICAN AMERICAN: >60 ML/MIN
GFR SERPL CREATININE-BSD FRML MDRD: ABNORMAL ML/MIN/{1.73_M2}
GFR SERPL CREATININE-BSD FRML MDRD: ABNORMAL ML/MIN/{1.73_M2}
GLOBULIN: ABNORMAL G/DL (ref 1.5–3.8)
GLUCOSE BLD-MCNC: 80 MG/DL (ref 70–99)
HCT VFR BLD CALC: 41.7 % (ref 36.3–47.1)
HEMOGLOBIN: 14.8 G/DL (ref 11.9–15.1)
MCH RBC QN AUTO: 31.2 PG (ref 25.2–33.5)
MCHC RBC AUTO-ENTMCNC: 35.5 G/DL (ref 28.4–34.8)
MCV RBC AUTO: 87.8 FL (ref 82.6–102.9)
NRBC AUTOMATED: 0 PER 100 WBC
PDW BLD-RTO: 12.5 % (ref 11.8–14.4)
PLATELET # BLD: 376 K/UL (ref 138–453)
PMV BLD AUTO: 10.6 FL (ref 8.1–13.5)
POTASSIUM SERPL-SCNC: 4.2 MMOL/L (ref 3.7–5.3)
RBC # BLD: 4.75 M/UL (ref 3.95–5.11)
SODIUM BLD-SCNC: 140 MMOL/L (ref 135–144)
TOTAL PROTEIN: 8 G/DL (ref 6.4–8.3)
WBC # BLD: 8.3 K/UL (ref 3.5–11.3)

## 2019-11-21 PROCEDURE — G8427 DOCREV CUR MEDS BY ELIG CLIN: HCPCS | Performed by: STUDENT IN AN ORGANIZED HEALTH CARE EDUCATION/TRAINING PROGRAM

## 2019-11-21 PROCEDURE — G8482 FLU IMMUNIZE ORDER/ADMIN: HCPCS | Performed by: STUDENT IN AN ORGANIZED HEALTH CARE EDUCATION/TRAINING PROGRAM

## 2019-11-21 PROCEDURE — 90688 IIV4 VACCINE SPLT 0.5 ML IM: CPT | Performed by: STUDENT IN AN ORGANIZED HEALTH CARE EDUCATION/TRAINING PROGRAM

## 2019-11-21 PROCEDURE — 36415 COLL VENOUS BLD VENIPUNCTURE: CPT

## 2019-11-21 PROCEDURE — 1036F TOBACCO NON-USER: CPT | Performed by: STUDENT IN AN ORGANIZED HEALTH CARE EDUCATION/TRAINING PROGRAM

## 2019-11-21 PROCEDURE — 99211 OFF/OP EST MAY X REQ PHY/QHP: CPT | Performed by: INTERNAL MEDICINE

## 2019-11-21 PROCEDURE — G8420 CALC BMI NORM PARAMETERS: HCPCS | Performed by: STUDENT IN AN ORGANIZED HEALTH CARE EDUCATION/TRAINING PROGRAM

## 2019-11-21 PROCEDURE — 85027 COMPLETE CBC AUTOMATED: CPT

## 2019-11-21 PROCEDURE — 99213 OFFICE O/P EST LOW 20 MIN: CPT | Performed by: STUDENT IN AN ORGANIZED HEALTH CARE EDUCATION/TRAINING PROGRAM

## 2019-11-21 PROCEDURE — 80076 HEPATIC FUNCTION PANEL: CPT

## 2019-11-21 PROCEDURE — 80048 BASIC METABOLIC PNL TOTAL CA: CPT

## 2019-11-21 RX ORDER — LEVETIRACETAM 750 MG/1
750 TABLET ORAL 2 TIMES DAILY
Qty: 60 TABLET | Refills: 5 | Status: SHIPPED | OUTPATIENT
Start: 2019-11-21 | End: 2020-05-28 | Stop reason: SDUPTHER

## 2019-11-21 RX ORDER — MELATONIN
Qty: 30 TABLET | Refills: 5 | Status: SHIPPED | OUTPATIENT
Start: 2019-11-21 | End: 2020-05-28 | Stop reason: SDUPTHER

## 2019-11-21 RX ORDER — FAMOTIDINE 20 MG/1
TABLET, FILM COATED ORAL
Qty: 60 TABLET | Refills: 5 | Status: SHIPPED | OUTPATIENT
Start: 2019-11-21 | End: 2020-05-28 | Stop reason: SDUPTHER

## 2019-11-21 RX ORDER — ALBUTEROL SULFATE 90 UG/1
AEROSOL, METERED RESPIRATORY (INHALATION)
Qty: 8.5 G | Refills: 2 | Status: SHIPPED | OUTPATIENT
Start: 2019-11-21 | End: 2020-02-22 | Stop reason: SDUPTHER

## 2020-02-22 RX ORDER — ALBUTEROL SULFATE 90 UG/1
AEROSOL, METERED RESPIRATORY (INHALATION)
Qty: 8.5 G | Refills: 2 | Status: SHIPPED | OUTPATIENT
Start: 2020-02-22 | End: 2020-05-28 | Stop reason: SDUPTHER

## 2020-05-28 RX ORDER — FAMOTIDINE 20 MG/1
TABLET, FILM COATED ORAL
Qty: 60 TABLET | Refills: 5 | Status: CANCELLED | OUTPATIENT
Start: 2020-05-28

## 2020-05-28 RX ORDER — LEVETIRACETAM 750 MG/1
750 TABLET ORAL 2 TIMES DAILY
Qty: 60 TABLET | Refills: 5 | Status: CANCELLED | OUTPATIENT
Start: 2020-05-28

## 2020-05-28 RX ORDER — ALBUTEROL SULFATE 90 UG/1
AEROSOL, METERED RESPIRATORY (INHALATION)
Qty: 8.5 G | Refills: 2 | Status: CANCELLED | OUTPATIENT
Start: 2020-05-28

## 2020-05-28 RX ORDER — ALBUTEROL SULFATE 90 UG/1
AEROSOL, METERED RESPIRATORY (INHALATION)
Qty: 8.5 G | Refills: 2 | Status: SHIPPED | OUTPATIENT
Start: 2020-05-28 | End: 2020-06-11 | Stop reason: SDUPTHER

## 2020-05-28 RX ORDER — LEVETIRACETAM 750 MG/1
750 TABLET ORAL 2 TIMES DAILY
Qty: 60 TABLET | Refills: 5 | Status: SHIPPED | OUTPATIENT
Start: 2020-05-28 | End: 2020-06-11 | Stop reason: SDUPTHER

## 2020-05-28 RX ORDER — MELATONIN
Qty: 30 TABLET | Refills: 5 | Status: SHIPPED | OUTPATIENT
Start: 2020-05-28 | End: 2020-06-11 | Stop reason: SDUPTHER

## 2020-05-28 RX ORDER — FAMOTIDINE 20 MG/1
TABLET, FILM COATED ORAL
Qty: 60 TABLET | Refills: 5 | Status: SHIPPED | OUTPATIENT
Start: 2020-05-28 | End: 2020-06-11 | Stop reason: SDUPTHER

## 2020-05-28 RX ORDER — MELATONIN
Qty: 30 TABLET | Refills: 5 | Status: CANCELLED | OUTPATIENT
Start: 2020-05-28

## 2020-06-11 ENCOUNTER — OFFICE VISIT (OUTPATIENT)
Dept: INTERNAL MEDICINE | Age: 40
End: 2020-06-11
Payer: COMMERCIAL

## 2020-06-11 ENCOUNTER — HOSPITAL ENCOUNTER (OUTPATIENT)
Age: 40
Setting detail: SPECIMEN
Discharge: HOME | End: 2020-06-11
Payer: COMMERCIAL

## 2020-06-11 VITALS
DIASTOLIC BLOOD PRESSURE: 71 MMHG | HEIGHT: 66 IN | WEIGHT: 120 LBS | BODY MASS INDEX: 19.29 KG/M2 | HEART RATE: 76 BPM | TEMPERATURE: 97.4 F | SYSTOLIC BLOOD PRESSURE: 108 MMHG

## 2020-06-11 LAB
CHOLESTEROL/HDL RATIO: 2.3
CHOLESTEROL: 210 MG/DL
HDLC SERPL-MCNC: 91 MG/DL
LDL CHOLESTEROL: 97 MG/DL (ref 0–130)
TRIGL SERPL-MCNC: 109 MG/DL
VLDLC SERPL CALC-MCNC: ABNORMAL MG/DL (ref 1–30)

## 2020-06-11 PROCEDURE — G8427 DOCREV CUR MEDS BY ELIG CLIN: HCPCS | Performed by: INTERNAL MEDICINE

## 2020-06-11 PROCEDURE — 99211 OFF/OP EST MAY X REQ PHY/QHP: CPT | Performed by: INTERNAL MEDICINE

## 2020-06-11 PROCEDURE — 99213 OFFICE O/P EST LOW 20 MIN: CPT | Performed by: INTERNAL MEDICINE

## 2020-06-11 PROCEDURE — G8420 CALC BMI NORM PARAMETERS: HCPCS | Performed by: INTERNAL MEDICINE

## 2020-06-11 PROCEDURE — 1036F TOBACCO NON-USER: CPT | Performed by: INTERNAL MEDICINE

## 2020-06-11 RX ORDER — MELATONIN
Qty: 30 TABLET | Refills: 5 | Status: SHIPPED | OUTPATIENT
Start: 2020-06-11 | End: 2020-12-28

## 2020-06-11 RX ORDER — FAMOTIDINE 20 MG/1
TABLET, FILM COATED ORAL
Qty: 60 TABLET | Refills: 5 | Status: SHIPPED | OUTPATIENT
Start: 2020-06-11 | End: 2020-12-04

## 2020-06-11 RX ORDER — ALBUTEROL SULFATE 90 UG/1
AEROSOL, METERED RESPIRATORY (INHALATION)
Qty: 8.5 G | Refills: 2 | Status: SHIPPED | OUTPATIENT
Start: 2020-06-11 | End: 2020-09-16

## 2020-06-11 RX ORDER — LEVETIRACETAM 750 MG/1
750 TABLET ORAL 2 TIMES DAILY
Qty: 60 TABLET | Refills: 5 | Status: SHIPPED | OUTPATIENT
Start: 2020-06-11 | End: 2020-12-04

## 2020-06-11 ASSESSMENT — PATIENT HEALTH QUESTIONNAIRE - PHQ9
SUM OF ALL RESPONSES TO PHQ9 QUESTIONS 1 & 2: 0
SUM OF ALL RESPONSES TO PHQ QUESTIONS 1-9: 0
2. FEELING DOWN, DEPRESSED OR HOPELESS: 0
SUM OF ALL RESPONSES TO PHQ QUESTIONS 1-9: 0
1. LITTLE INTEREST OR PLEASURE IN DOING THINGS: 0

## 2020-06-11 NOTE — PROGRESS NOTES
Visit Information    Have you changed or started any medications since your last visit including any over-the-counter medicines, vitamins, or herbal medicines? no   Have you stopped taking any of your medications? Is so, why? -  no  Are you having any side effects from any of your medications? - no    Have you seen any other physician or provider since your last visit?  no   Have you had any other diagnostic tests since your last visit? yes - labs   Have you been seen in the emergency room and/or had an admission in a hospital since we last saw you?  no   Have you had your routine dental cleaning in the past 6 months?  no     Do you have an active MyChart account? If no, what is the barrier?   No:      Patient Care Team:  Antonio Mancia MD as PCP - General (Internal Medicine)  Toni Espinosa MD as Consulting Physician (Internal Medicine)    Medical History Review  Past Medical, Family, and Social History reviewed and does not contribute to the patient presenting condition    Health Maintenance   Topic Date Due    Annual Wellness Visit (AWV)  05/30/2019    Cervical cancer screen  02/05/2021    DTaP/Tdap/Td vaccine (2 - Td) 09/13/2026    Flu vaccine  Completed    Pneumococcal 0-64 years Vaccine  Completed    HIV screen  Completed    Hepatitis A vaccine  Aged Out    Hepatitis B vaccine  Aged Out    Hib vaccine  Aged Out    Meningococcal (ACWY) vaccine  Aged Out    Varicella vaccine  Discontinued

## 2020-06-11 NOTE — PROGRESS NOTES
Reflexes normal and symmetric. Sensation grossly normal  · Eye no icterus no redness    LABORATORY FINDINGS:    CBC:  Lab Results   Component Value Date    WBC 8.3 11/21/2019    HGB 14.8 11/21/2019     11/21/2019     03/21/2012     BMP:    Lab Results   Component Value Date     11/21/2019    K 4.2 11/21/2019     11/21/2019    CO2 18 11/21/2019    BUN 10 11/21/2019    CREATININE 0.74 11/21/2019    GLUCOSE 80 11/21/2019    GLUCOSE 118 03/21/2012     HEMOGLOBIN A1C: No results found for: LABA1C  MICROALBUMIN URINE: No results found for: MICROALBUR  FASTING LIPID PANEL:No results found for: CHOL, HDL, TRIG  No results found for: LDLCALC, LDLCHOLESTEROL, LDLDIRECT    LIVER PROFILE:  Lab Results   Component Value Date    ALT 10 11/21/2019    AST 17 11/21/2019    PROT 8.0 11/21/2019    BILITOT 0.28 11/21/2019    BILIDIR <0.08 11/21/2019    LABALBU 4.8 11/21/2019      THYROID FUNCTION:   Lab Results   Component Value Date    TSH 1.45 03/03/2014      URINE ANALYSIS: No results found for: LABURIN  ASSESSMENT AND PLAN:    1. Mild intermittent reactive airway disease without complication    - albuterol sulfate HFA (PROAIR HFA) 108 (90 Base) MCG/ACT inhaler; INHALE 2 PUFFS INTO THE LUNGS EVERY 6 HOURS AS NEEDED FOR WHEEZING. Dispense: 8.5 g; Refill: 2    2. Gastroesophageal reflux disease without esophagitis    - famotidine (PEPCID) 20 MG tablet; TAKE 1 TABLET TWICE DAILY. Dispense: 60 tablet; Refill: 5    3. Complex febrile convulsion (HCC)    - levETIRAcetam (KEPPRA) 750 MG tablet; Take 1 tablet by mouth 2 times daily  Dispense: 60 tablet; Refill: 5    4. Vitamin D deficiency    - vitamin D3 (CHOLECALCIFEROL) 25 MCG (1000 UT) TABS tablet; TAKE 1 TABLET ONCE DAILY  Dispense: 30 tablet; Refill: 5          Health Maintenance    FOLLOW UP AND INSTRUCTIONS:     To follow-up at the clinic in 1 year.     1Ubaldo Novak received counseling on the following healthy behaviors: nutrition, exercise and medication adherence    2. Reviewed prior labs and health maintenance. 3. Discussed use, benefit, and side effects of prescribed medications. Barriers to medication compliance addressed. All patient questions answered. Pt voiced understanding.      4. Patient given educational materials - see patient instructions    Aisilnn Winkler MD  PGY-1, Internal Medicine Resident  [facility]  6/11/2020 3:34 PM

## 2020-06-11 NOTE — PATIENT INSTRUCTIONS
Medications e-scribe to pharmacy of pt's choice. Labs given to patient, they will have them done before their next visit. Your doctor has ordered fasting blood work. It can be done at [facility] or at the hospital. Stephy Giraldo will need to fast for 12 hours and bring order with you to registration department. Patient will be contacted to schedule their next appointment.     ANUM

## 2020-06-11 NOTE — PROGRESS NOTES
Attending Physician Statement  I have discussed the care of [address] including pertinent history and exam findings,  with the resident. I have reviewed the key elements of all parts of the encounter with the resident. I agree with the assessment, plan and orders as documented by the resident.   (GE Modifier)    MD ROB Zhang  Attending Physician, [address] [place] [address], Internal Medicine Residency Program  [address] [place] [address]  6/11/2020, 4:02 PM

## 2020-09-11 NOTE — TELEPHONE ENCOUNTER
Request for albuterol sulfate - med pended. Please fill if appropriate. Next Visit Date:  No future appointments.     Health Maintenance   Topic Date Due    Annual Wellness Visit (AWV)  05/30/2019    Flu vaccine (1) 09/01/2020    Cervical cancer screen  02/05/2021    DTaP/Tdap/Td vaccine (2 - Td) 09/13/2026    Pneumococcal 0-64 years Vaccine  Completed    HIV screen  Completed    Hepatitis A vaccine  Aged Out    Hepatitis B vaccine  Aged Out    Hib vaccine  Aged Out    Meningococcal (ACWY) vaccine  Aged Out    Varicella vaccine  Discontinued       No results found for: LABA1C          ( goal A1C is < 7)   No results found for: LABMICR  LDL Cholesterol (mg/dL)   Date Value   06/11/2020 97       (goal LDL is <100)   AST (U/L)   Date Value   11/21/2019 17     ALT (U/L)   Date Value   11/21/2019 10     BUN (mg/dL)   Date Value   11/21/2019 10     BP Readings from Last 3 Encounters:   06/11/20 108/71   11/21/19 125/88   05/30/19 122/89          (goal 120/80)    All Future Testing planned in [facility]        Patient Active Problem List:     GERD (gastroesophageal reflux disease)     Mental retardation     Seizure (HCC)     Irritable bowel syndrome without diarrhea

## 2020-09-16 RX ORDER — ALBUTEROL SULFATE 90 UG/1
AEROSOL, METERED RESPIRATORY (INHALATION)
Qty: 8.5 G | Refills: 1 | Status: SHIPPED | OUTPATIENT
Start: 2020-09-16 | End: 2020-12-04

## 2020-12-03 NOTE — TELEPHONE ENCOUNTER
Refill request for keppra, and albuterol and pepcid      Pt on wait list til 05/2021      Next Visit Date:  No future appointments.     Health Maintenance   Topic Date Due    Annual Wellness Visit (AWV)  05/30/2019    Flu vaccine (1) 09/01/2020    Cervical cancer screen  02/05/2021    DTaP/Tdap/Td vaccine (2 - Td) 09/13/2026    HIV screen  Completed    Hepatitis A vaccine  Aged Out    Hepatitis B vaccine  Aged Out    Hib vaccine  Aged Out    Meningococcal (ACWY) vaccine  Aged Out    Pneumococcal 0-64 years Vaccine  Aged Out    Varicella vaccine  Discontinued       No results found for: LABA1C          ( goal A1C is < 7)   No results found for: LABMICR  LDL Cholesterol (mg/dL)   Date Value   06/11/2020 97       (goal LDL is <100)   AST (U/L)   Date Value   11/21/2019 17     ALT (U/L)   Date Value   11/21/2019 10     BUN (mg/dL)   Date Value   11/21/2019 10     BP Readings from Last 3 Encounters:   06/11/20 108/71   11/21/19 125/88   05/30/19 122/89          (goal 120/80)    All Future Testing planned in [facility]              Patient Active Problem List:     GERD (gastroesophageal reflux disease)     Mental retardation     Seizure (HCC)     Irritable bowel syndrome without diarrhea

## 2020-12-04 RX ORDER — LEVETIRACETAM 750 MG/1
TABLET ORAL
Qty: 60 TABLET | Refills: 4 | Status: SHIPPED | OUTPATIENT
Start: 2020-12-04 | End: 2021-05-20

## 2020-12-04 RX ORDER — FAMOTIDINE 20 MG/1
TABLET, FILM COATED ORAL
Qty: 60 TABLET | Refills: 4 | Status: SHIPPED | OUTPATIENT
Start: 2020-12-04 | End: 2021-04-24

## 2020-12-04 RX ORDER — ALBUTEROL SULFATE 90 UG/1
AEROSOL, METERED RESPIRATORY (INHALATION)
Qty: 8.5 G | Refills: 1 | Status: SHIPPED | OUTPATIENT
Start: 2020-12-04 | End: 2021-01-04

## 2020-12-28 RX ORDER — VITAMIN B COMPLEX
TABLET ORAL
Qty: 30 TABLET | Refills: 4 | Status: SHIPPED | OUTPATIENT
Start: 2020-12-28 | End: 2021-05-20

## 2020-12-28 NOTE — TELEPHONE ENCOUNTER
Pt on wait list on June    Vitamin d refill request          Next Visit Date:  No future appointments.     Health Maintenance   Topic Date Due    Hepatitis C screen  1980    Annual Wellness Visit (AWV)  05/30/2019    Flu vaccine (1) 09/01/2020    Cervical cancer screen  02/05/2021    DTaP/Tdap/Td vaccine (2 - Td) 09/13/2026    Pneumococcal 0-64 years Vaccine  Completed    HIV screen  Completed    Hepatitis A vaccine  Aged Out    Hepatitis B vaccine  Aged Out    Hib vaccine  Aged Out    Meningococcal (ACWY) vaccine  Aged Out    Varicella vaccine  Discontinued       No results found for: LABA1C          ( goal A1C is < 7)   No results found for: LABMICR  LDL Cholesterol (mg/dL)   Date Value   06/11/2020 97       (goal LDL is <100)   AST (U/L)   Date Value   11/21/2019 17     ALT (U/L)   Date Value   11/21/2019 10     BUN (mg/dL)   Date Value   11/21/2019 10     BP Readings from Last 3 Encounters:   06/11/20 108/71   11/21/19 125/88   05/30/19 122/89          (goal 120/80)    All Future Testing planned in [facility]              Patient Active Problem List:     GERD (gastroesophageal reflux disease)     Mental retardation     Seizure (HCC)     Irritable bowel syndrome without diarrhea

## 2021-01-02 DIAGNOSIS — J45.20 MILD INTERMITTENT REACTIVE AIRWAY DISEASE WITHOUT COMPLICATION: ICD-10-CM

## 2021-01-07 RX ORDER — ALBUTEROL SULFATE 90 UG/1
AEROSOL, METERED RESPIRATORY (INHALATION)
Qty: 8.5 G | Refills: 5 | Status: SHIPPED | OUTPATIENT
Start: 2021-01-07 | End: 2021-05-20

## 2021-01-13 ENCOUNTER — TELEPHONE (OUTPATIENT)
Dept: INTERNAL MEDICINE | Age: 41
End: 2021-01-13

## 2021-01-13 NOTE — TELEPHONE ENCOUNTER
PA request received for Albuterol Sulfate  (90 Base)MCG/ACT aerosol    PA processed and submitted to pt insurance, waiting for response in regards to medication coverage

## 2021-01-18 NOTE — TELEPHONE ENCOUNTER
Patient will need to be on Albuterol nebulizer/inhaler for Mild intermittent reactive airway disease.      Juline Leventhal MD  PGY-2, Internal Medicine Resident  [facility]  1/18/2021 3:21 PM

## 2021-03-11 ENCOUNTER — HOSPITAL ENCOUNTER (EMERGENCY)
Age: 41
Discharge: HOME | End: 2021-03-11
Attending: EMERGENCY MEDICINE
Payer: COMMERCIAL

## 2021-03-11 ENCOUNTER — APPOINTMENT (OUTPATIENT)
Dept: CT IMAGING | Age: 41
End: 2021-03-11
Payer: COMMERCIAL

## 2021-03-11 VITALS
TEMPERATURE: 97.9 F | DIASTOLIC BLOOD PRESSURE: 69 MMHG | OXYGEN SATURATION: 97 % | BODY MASS INDEX: 21.9 KG/M2 | RESPIRATION RATE: 18 BRPM | HEIGHT: 62 IN | WEIGHT: 119 LBS | HEART RATE: 51 BPM | SYSTOLIC BLOOD PRESSURE: 132 MMHG

## 2021-03-11 DIAGNOSIS — R11.2 NON-INTRACTABLE VOMITING WITH NAUSEA, UNSPECIFIED VOMITING TYPE: Primary | ICD-10-CM

## 2021-03-11 LAB
ABSOLUTE EOS #: <0.03 K/UL (ref 0–0.44)
ABSOLUTE IMMATURE GRANULOCYTE: 0.12 K/UL (ref 0–0.3)
ABSOLUTE LYMPH #: 0.81 K/UL (ref 1.1–3.7)
ABSOLUTE MONO #: 0.57 K/UL (ref 0.1–1.2)
ANION GAP SERPL CALCULATED.3IONS-SCNC: 14 MMOL/L (ref 9–17)
BASOPHILS # BLD: 0 % (ref 0–2)
BASOPHILS ABSOLUTE: 0.05 K/UL (ref 0–0.2)
BUN BLDV-MCNC: 15 MG/DL (ref 6–20)
BUN/CREAT BLD: 17 (ref 9–20)
CALCIUM SERPL-MCNC: 9.2 MG/DL (ref 8.6–10.4)
CHLORIDE BLD-SCNC: 103 MMOL/L (ref 98–107)
CO2: 19 MMOL/L (ref 20–31)
CREAT SERPL-MCNC: 0.89 MG/DL (ref 0.5–0.9)
DIFFERENTIAL TYPE: ABNORMAL
EOSINOPHILS RELATIVE PERCENT: 0 % (ref 1–4)
GFR AFRICAN AMERICAN: >60 ML/MIN
GFR NON-AFRICAN AMERICAN: >60 ML/MIN
GFR SERPL CREATININE-BSD FRML MDRD: ABNORMAL ML/MIN/{1.73_M2}
GFR SERPL CREATININE-BSD FRML MDRD: ABNORMAL ML/MIN/{1.73_M2}
GLUCOSE BLD-MCNC: 124 MG/DL (ref 70–99)
HCG QUALITATIVE: NEGATIVE
HCT VFR BLD CALC: 40.5 % (ref 36.3–47.1)
HEMOGLOBIN: 13.9 G/DL (ref 11.9–15.1)
IMMATURE GRANULOCYTES: 1 %
LYMPHOCYTES # BLD: 5 % (ref 24–43)
MCH RBC QN AUTO: 30.3 PG (ref 25.2–33.5)
MCHC RBC AUTO-ENTMCNC: 34.3 G/DL (ref 28.4–34.8)
MCV RBC AUTO: 88.2 FL (ref 82.6–102.9)
MONOCYTES # BLD: 4 % (ref 3–12)
NRBC AUTOMATED: 0 PER 100 WBC
PDW BLD-RTO: 12.3 % (ref 11.8–14.4)
PLATELET # BLD: 308 K/UL (ref 138–453)
PLATELET ESTIMATE: ABNORMAL
PMV BLD AUTO: 9.5 FL (ref 8.1–13.5)
POTASSIUM SERPL-SCNC: 4.6 MMOL/L (ref 3.7–5.3)
RBC # BLD: 4.59 M/UL (ref 3.95–5.11)
RBC # BLD: ABNORMAL 10*6/UL
SEG NEUTROPHILS: 90 % (ref 36–65)
SEGMENTED NEUTROPHILS ABSOLUTE COUNT: 13.37 K/UL (ref 1.5–8.1)
SODIUM BLD-SCNC: 136 MMOL/L (ref 135–144)
WBC # BLD: 14.9 K/UL (ref 3.5–11.3)
WBC # BLD: ABNORMAL 10*3/UL

## 2021-03-11 PROCEDURE — 2580000003 HC RX 258: Performed by: EMERGENCY MEDICINE

## 2021-03-11 PROCEDURE — 99284 EMERGENCY DEPT VISIT MOD MDM: CPT

## 2021-03-11 PROCEDURE — 6360000002 HC RX W HCPCS: Performed by: EMERGENCY MEDICINE

## 2021-03-11 PROCEDURE — 74177 CT ABD & PELVIS W/CONTRAST: CPT

## 2021-03-11 PROCEDURE — 84703 CHORIONIC GONADOTROPIN ASSAY: CPT

## 2021-03-11 PROCEDURE — 6360000004 HC RX CONTRAST MEDICATION: Performed by: EMERGENCY MEDICINE

## 2021-03-11 PROCEDURE — 80048 BASIC METABOLIC PNL TOTAL CA: CPT

## 2021-03-11 PROCEDURE — 96374 THER/PROPH/DIAG INJ IV PUSH: CPT

## 2021-03-11 PROCEDURE — 85025 COMPLETE CBC W/AUTO DIFF WBC: CPT

## 2021-03-11 RX ORDER — ONDANSETRON 2 MG/ML
4 INJECTION INTRAMUSCULAR; INTRAVENOUS ONCE
Status: COMPLETED | OUTPATIENT
Start: 2021-03-11 | End: 2021-03-11

## 2021-03-11 RX ORDER — 0.9 % SODIUM CHLORIDE 0.9 %
80 INTRAVENOUS SOLUTION INTRAVENOUS ONCE
Status: COMPLETED | OUTPATIENT
Start: 2021-03-11 | End: 2021-03-11

## 2021-03-11 RX ORDER — SODIUM CHLORIDE 0.9 % (FLUSH) 0.9 %
10 SYRINGE (ML) INJECTION PRN
Status: DISCONTINUED | OUTPATIENT
Start: 2021-03-11 | End: 2021-03-11 | Stop reason: HOSPADM

## 2021-03-11 RX ORDER — ONDANSETRON 4 MG/1
4 TABLET, ORALLY DISINTEGRATING ORAL EVERY 6 HOURS PRN
Qty: 12 TABLET | Refills: 0 | Status: SHIPPED | OUTPATIENT
Start: 2021-03-11 | End: 2021-03-16

## 2021-03-11 RX ORDER — 0.9 % SODIUM CHLORIDE 0.9 %
1000 INTRAVENOUS SOLUTION INTRAVENOUS ONCE
Status: COMPLETED | OUTPATIENT
Start: 2021-03-11 | End: 2021-03-11

## 2021-03-11 RX ADMIN — ONDANSETRON 4 MG: 2 INJECTION INTRAMUSCULAR; INTRAVENOUS at 18:03

## 2021-03-11 RX ADMIN — SODIUM CHLORIDE 1000 ML: 9 INJECTION, SOLUTION INTRAVENOUS at 18:02

## 2021-03-11 RX ADMIN — IOPAMIDOL 75 ML: 755 INJECTION, SOLUTION INTRAVENOUS at 20:12

## 2021-03-11 RX ADMIN — SODIUM CHLORIDE, PRESERVATIVE FREE 10 ML: 5 INJECTION INTRAVENOUS at 20:12

## 2021-03-11 RX ADMIN — SODIUM CHLORIDE 80 ML: 0.9 INJECTION, SOLUTION INTRAVENOUS at 20:12

## 2021-03-11 ASSESSMENT — ENCOUNTER SYMPTOMS
ABDOMINAL PAIN: 1
SHORTNESS OF BREATH: 0
EYE DISCHARGE: 0
COLOR CHANGE: 0
CONSTIPATION: 0
FACIAL SWELLING: 0
VOMITING: 1
NAUSEA: 1
DIARRHEA: 1
COUGH: 0
EYE REDNESS: 0

## 2021-03-11 NOTE — ED PROVIDER NOTES
[address] [place] [address] ED  EMERGENCY DEPARTMENT ENCOUNTER      Pt Name: Arturo Griffin  MRN: [id number]  [place] 1980  Date of evaluation: 3/11/2021  Provider: Roderick Rae MD    CHIEF COMPLAINT       Chief Complaint   Patient presents with    Emesis    Diarrhea         HISTORY OF PRESENT ILLNESS  (Location/Symptom, Timing/Onset, Context/Setting, Quality, Duration, Modifying Factors, Severity.)   Arturo Griffin is a 46 y.o. female who presents to the emergency department for nausea vomiting and diarrhea. She states that start about 10 hours ago and her stomach hurts and she rates that as a 10. No hematemesis or blood in her stool. She states she has a history of stomach problems. Other family members are not ill. She does not know why she might be sick. Nursing Notes were reviewed. ALLERGIES     Omeprazole    CURRENT MEDICATIONS       Previous Medications    ALBUTEROL SULFATE HFA (PROAIR HFA) 108 (90 BASE) MCG/ACT INHALER    INHALE TWO PUFFS BY MOUTH EVERY 6 HOURS AS NEEDED FOR WHEEZING    FAMOTIDINE (PEPCID) 20 MG TABLET    TAKE ONE TABLET BY MOUTH TWICE A DAY    LEVETIRACETAM (KEPPRA) 750 MG TABLET    TAKE ONE TABLET BY MOUTH TWICE A DAY    VITAMIN D (CHOLECALCIFEROL) 25 MCG (1000 UT) TABS TABLET    TAKE ONE TABLET BY MOUTH DAILY       PAST MEDICAL HISTORY         Diagnosis Date    GERD (gastroesophageal reflux disease)     Irritable bowel syndrome without diarrhea 2016    Seizures (HCC)     2013, 14       SURGICAL HISTORY           Procedure Laterality Date     SECTION          TUBAL LIGATION               FAMILY HISTORY           Problem Relation Age of Onset    High Cholesterol Mother     Birth Defects Neg Hx     Diabetes Neg Hx      Family Status   Relation Name Status    Mother  Alive    Father  Alive    Neg Hx  (Not Specified)        SOCIAL HISTORY      reports that she quit smoking about 7 years ago. Her smoking use included cigarettes.  She has a Tenderness: There is abdominal tenderness. Comments: Mild tenderness in the right lower abdomen. Musculoskeletal: Normal range of motion. Lymphadenopathy:      Cervical: No cervical adenopathy. Skin:     General: Skin is warm and dry. Findings: No erythema or rash. Neurological:      Mental Status: She is alert and oriented to person, place, and time. Psychiatric:         Behavior: Behavior normal.             DIAGNOSTIC RESULTS     EKG: All EKG's are interpreted by the Emergency Department Physician who either signs or Co-signs this chart in the absence of a cardiologist.    Not indicated    RADIOLOGY:   Non-plain film images such as CT, Ultrasound and MRI are read by the radiologist. Plain radiographic images are visualized and preliminarily interpreted by the emergency physician with the below findings:    Interpretation per the Radiologist below, if available at the time of this note:    Ct Abdomen Pelvis W Iv Contrast Additional Contrast? None    Result Date: 3/11/2021  EXAMINATION: CT OF THE ABDOMEN AND PELVIS WITH CONTRAST 3/11/2021 3:09 pm TECHNIQUE: CT of the abdomen and pelvis was performed with the administration of intravenous contrast. Multiplanar reformatted images are provided for review. Dose modulation, iterative reconstruction, and/or weight based adjustment of the mA/kV was utilized to reduce the radiation dose to as low as reasonably achievable. COMPARISON: August 28, 2008 HISTORY: ORDERING SYSTEM PROVIDED HISTORY: Pain, elevated WBC TECHNOLOGIST PROVIDED HISTORY: Pain, elevated WBC Decision Support Exception->Emergency Medical Condition (MA) Reason for Exam: Pt c/o abdominal pain, nausea, vomiting and diarrhea that start about 10 hours ago Acuity: Acute Type of Exam: Initial Relevant Medical/Surgical History: Hx of tubal ligation, IBS FINDINGS: Lower Chest: The lung bases are clear. Organs: Subcentimeter hepatic cysts are present.   Gallbladder appears normal, without calcified gallstones. Mild degree of intrahepatic biliary ductal dilatation is again visualized. Chanda Profit Spleen is normal in size. Pancreas is normal.  Adrenal glands are normal bilaterally. Multiple cortical cysts are present on the kidneys bilaterally. Cysts have increased in size since the previous study. No hydronephrosis is present. A punctate nonobstructing left intrarenal calculus is present. GI/Bowel: Small hiatal hernia is present. Small bowel appears grossly normal without evidence of obstruction. The appendix is normal.  Moderate amount of stool is present within the colon. Pelvis: Urinary bladder appears normal.  Uterus is heterogeneous in attenuation, suggesting fibroids. Follicles are present on the ovaries. A hemorrhagic cyst or corpus luteum cyst is present on the right ovary measuring approximately 1.8 cm. Peritoneum/Retroperitoneum: No free air is present and right pelvis. No aneurysm formation is noted. No adenopathy is noted. Bones/Soft Tissues: No acute osseous abnormality is present. 1. Numerous renal cysts again visualized bilaterally, and of increased in size. Findings are consistent with autosomal dominant polycystic kidney disease 2. Scattered subcentimeter hepatic cysts 3. Mild degree of intrahepatic biliary ductal dilatation again noted. 4. Normal appendix 5.  1.8 cm hemorrhagic or corpus luteum cyst, right ovary     LABS:  Labs Reviewed   BASIC METABOLIC PANEL - Abnormal; Notable for the following components:       Result Value    Glucose 124 (*)     CO2 19 (*)     All other components within normal limits   CBC WITH AUTO DIFFERENTIAL - Abnormal; Notable for the following components:    WBC 14.9 (*)     Seg Neutrophils 90 (*)     Lymphocytes 5 (*)     Eosinophils % 0 (*)     Immature Granulocytes 1 (*)     Segs Absolute 13.37 (*)     Absolute Lymph # 0.81 (*)     All other components within normal limits   HCG, SERUM, QUALITATIVE       All other labs were within normal range or not returned as of this dictation. EMERGENCY DEPARTMENT COURSE and DIFFERENTIAL DIAGNOSIS/MDM:   Vitals:    Vitals:    03/11/21 1716   BP: 132/69   Pulse: 51   Resp: 18   Temp: 97.9 °F (36.6 °C)   SpO2: 97%   Weight: 119 lb (54 kg)   Height: 5' 2\" (1.575 m)       Orders Placed This Encounter   Medications    0.9 % sodium chloride bolus    ondansetron (ZOFRAN) injection 4 mg    0.9 % sodium chloride bolus    sodium chloride flush 0.9 % injection 10 mL    iopamidol (ISOVUE-370) 76 % injection 75 mL    ondansetron (ZOFRAN ODT) 4 MG disintegrating tablet     Sig: Take 1 tablet by mouth every 6 hours as needed for Nausea or Vomiting     Dispense:  12 tablet     Refill:  0       Medical Decision Making: The patient does not have appendicitis. Multiple bilateral renal cysts noted. She appears to have polycystic kidney disease and this was discussed with her. It was previously undiagnosed. Treatment diagnosis and follow-up were discussed with the patient. CONSULTS:  None    PROCEDURES:  None    FINAL IMPRESSION      1.  Non-intractable vomiting with nausea, unspecified vomiting type          DISPOSITION/PLAN   DISPOSITION Decision To Discharge 03/11/2021 08:32:17 PM      PATIENT REFERRED TO:   Rodriguez Negron MD  [id number] [place] [address]  [phone]      As needed    [facility] ED  [address] [facility]  [phone]    If symptoms worsen      DISCHARGE MEDICATIONS:     New Prescriptions    ONDANSETRON (ZOFRAN ODT) 4 MG DISINTEGRATING TABLET    Take 1 tablet by mouth every 6 hours as needed for Nausea or Vomiting         (Please note that portions of this note were completed with a voice recognition program.  Efforts were made to edit the dictations but occasionally words are mis-transcribed.)    Yoav Dawkins MD  Attending Emergency Physician           Yoav Dawkins MD  03/11/21 2033

## 2021-03-16 ENCOUNTER — HOSPITAL ENCOUNTER (EMERGENCY)
Age: 41
Discharge: HOME | End: 2021-03-16
Attending: EMERGENCY MEDICINE
Payer: COMMERCIAL

## 2021-03-16 ENCOUNTER — APPOINTMENT (OUTPATIENT)
Dept: GENERAL RADIOLOGY | Age: 41
End: 2021-03-16
Payer: COMMERCIAL

## 2021-03-16 VITALS
TEMPERATURE: 97.5 F | SYSTOLIC BLOOD PRESSURE: 129 MMHG | RESPIRATION RATE: 18 BRPM | DIASTOLIC BLOOD PRESSURE: 90 MMHG | BODY MASS INDEX: 21.18 KG/M2 | HEART RATE: 81 BPM | OXYGEN SATURATION: 98 % | WEIGHT: 115.8 LBS

## 2021-03-16 DIAGNOSIS — R11.2 NAUSEA VOMITING AND DIARRHEA: Primary | ICD-10-CM

## 2021-03-16 DIAGNOSIS — R19.7 NAUSEA VOMITING AND DIARRHEA: Primary | ICD-10-CM

## 2021-03-16 LAB
-: ABNORMAL
ABSOLUTE EOS #: 0.05 K/UL (ref 0–0.44)
ABSOLUTE IMMATURE GRANULOCYTE: 0.01 K/UL (ref 0–0.3)
ABSOLUTE LYMPH #: 1.39 K/UL (ref 1.1–3.7)
ABSOLUTE MONO #: 0.25 K/UL (ref 0.1–1.2)
ALBUMIN SERPL-MCNC: 4.6 G/DL (ref 3.5–5.2)
ALBUMIN/GLOBULIN RATIO: ABNORMAL (ref 1–2.5)
ALP BLD-CCNC: 50 U/L (ref 35–104)
ALT SERPL-CCNC: 16 U/L (ref 5–33)
AMORPHOUS: ABNORMAL
ANION GAP SERPL CALCULATED.3IONS-SCNC: 14 MMOL/L (ref 9–17)
AST SERPL-CCNC: 17 U/L
BACTERIA: ABNORMAL
BASOPHILS # BLD: 1 % (ref 0–2)
BASOPHILS ABSOLUTE: 0.05 K/UL (ref 0–0.2)
BILIRUB SERPL-MCNC: 0.38 MG/DL (ref 0.3–1.2)
BILIRUBIN URINE: NEGATIVE
BUN BLDV-MCNC: 11 MG/DL (ref 6–20)
BUN/CREAT BLD: 14 (ref 9–20)
CALCIUM SERPL-MCNC: 10 MG/DL (ref 8.6–10.4)
CASTS UA: ABNORMAL /LPF
CHLORIDE BLD-SCNC: 100 MMOL/L (ref 98–107)
CO2: 24 MMOL/L (ref 20–31)
COLOR: YELLOW
COMMENT UA: ABNORMAL
CREAT SERPL-MCNC: 0.81 MG/DL (ref 0.5–0.9)
CRYSTALS, UA: ABNORMAL /HPF
DIFFERENTIAL TYPE: ABNORMAL
EOSINOPHILS RELATIVE PERCENT: 1 % (ref 1–4)
EPITHELIAL CELLS UA: ABNORMAL /HPF (ref 0–5)
FERRITIN: 176 UG/L (ref 13–150)
GFR AFRICAN AMERICAN: >60 ML/MIN
GFR NON-AFRICAN AMERICAN: >60 ML/MIN
GFR SERPL CREATININE-BSD FRML MDRD: ABNORMAL ML/MIN/{1.73_M2}
GFR SERPL CREATININE-BSD FRML MDRD: ABNORMAL ML/MIN/{1.73_M2}
GLUCOSE BLD-MCNC: 119 MG/DL (ref 70–99)
GLUCOSE URINE: NEGATIVE
HCG QUALITATIVE: NEGATIVE
HCT VFR BLD CALC: 37.7 % (ref 36.3–47.1)
HEMOGLOBIN: 13.8 G/DL (ref 11.9–15.1)
IMMATURE GRANULOCYTES: 0 %
KETONES, URINE: NEGATIVE
LACTATE DEHYDROGENASE: 198 U/L (ref 135–214)
LEUKOCYTE ESTERASE, URINE: NEGATIVE
LIPASE: 15 U/L (ref 13–60)
LYMPHOCYTES # BLD: 26 % (ref 24–43)
MCH RBC QN AUTO: 30.5 PG (ref 25.2–33.5)
MCHC RBC AUTO-ENTMCNC: 36.6 G/DL (ref 28.4–34.8)
MCV RBC AUTO: 83.2 FL (ref 82.6–102.9)
MONOCYTES # BLD: 5 % (ref 3–12)
MUCUS: ABNORMAL
NITRITE, URINE: NEGATIVE
NRBC AUTOMATED: 0 PER 100 WBC
OTHER OBSERVATIONS UA: ABNORMAL
PDW BLD-RTO: 11.7 % (ref 11.8–14.4)
PH UA: 7 (ref 5–8)
PLATELET # BLD: 324 K/UL (ref 138–453)
PLATELET ESTIMATE: ABNORMAL
PMV BLD AUTO: 9.4 FL (ref 8.1–13.5)
POTASSIUM SERPL-SCNC: 3.3 MMOL/L (ref 3.7–5.3)
PROTEIN UA: ABNORMAL
RBC # BLD: 4.53 M/UL (ref 3.95–5.11)
RBC # BLD: ABNORMAL 10*6/UL
RBC UA: ABNORMAL /HPF (ref 0–2)
RENAL EPITHELIAL, UA: ABNORMAL /HPF
SEG NEUTROPHILS: 68 % (ref 36–65)
SEGMENTED NEUTROPHILS ABSOLUTE COUNT: 3.71 K/UL (ref 1.5–8.1)
SODIUM BLD-SCNC: 138 MMOL/L (ref 135–144)
SPECIFIC GRAVITY UA: 1.02 (ref 1–1.03)
TOTAL PROTEIN: 7.2 G/DL (ref 6.4–8.3)
TRICHOMONAS: ABNORMAL
TURBIDITY: ABNORMAL
URINE HGB: ABNORMAL
UROBILINOGEN, URINE: NORMAL
WBC # BLD: 5.5 K/UL (ref 3.5–11.3)
WBC # BLD: ABNORMAL 10*3/UL
WBC UA: ABNORMAL /HPF (ref 0–5)
YEAST: ABNORMAL

## 2021-03-16 PROCEDURE — 84703 CHORIONIC GONADOTROPIN ASSAY: CPT

## 2021-03-16 PROCEDURE — 2580000003 HC RX 258: Performed by: PHYSICIAN ASSISTANT

## 2021-03-16 PROCEDURE — 96374 THER/PROPH/DIAG INJ IV PUSH: CPT

## 2021-03-16 PROCEDURE — 2500000003 HC RX 250 WO HCPCS: Performed by: PHYSICIAN ASSISTANT

## 2021-03-16 PROCEDURE — U0003 INFECTIOUS AGENT DETECTION BY NUCLEIC ACID (DNA OR RNA); SEVERE ACUTE RESPIRATORY SYNDROME CORONAVIRUS 2 (SARS-COV-2) (CORONAVIRUS DISEASE [COVID-19]), AMPLIFIED PROBE TECHNIQUE, MAKING USE OF HIGH THROUGHPUT TECHNOLOGIES AS DESCRIBED BY CMS-2020-01-R: HCPCS

## 2021-03-16 PROCEDURE — 99282 EMERGENCY DEPT VISIT SF MDM: CPT

## 2021-03-16 PROCEDURE — 80053 COMPREHEN METABOLIC PANEL: CPT

## 2021-03-16 PROCEDURE — 83615 LACTATE (LD) (LDH) ENZYME: CPT

## 2021-03-16 PROCEDURE — 82728 ASSAY OF FERRITIN: CPT

## 2021-03-16 PROCEDURE — 6360000002 HC RX W HCPCS: Performed by: PHYSICIAN ASSISTANT

## 2021-03-16 PROCEDURE — 85025 COMPLETE CBC W/AUTO DIFF WBC: CPT

## 2021-03-16 PROCEDURE — 83690 ASSAY OF LIPASE: CPT

## 2021-03-16 PROCEDURE — 71045 X-RAY EXAM CHEST 1 VIEW: CPT

## 2021-03-16 PROCEDURE — 81001 URINALYSIS AUTO W/SCOPE: CPT

## 2021-03-16 PROCEDURE — U0005 INFEC AGEN DETEC AMPLI PROBE: HCPCS

## 2021-03-16 PROCEDURE — 96375 TX/PRO/DX INJ NEW DRUG ADDON: CPT

## 2021-03-16 RX ORDER — ONDANSETRON 2 MG/ML
4 INJECTION INTRAMUSCULAR; INTRAVENOUS ONCE
Status: COMPLETED | OUTPATIENT
Start: 2021-03-16 | End: 2021-03-16

## 2021-03-16 RX ORDER — 0.9 % SODIUM CHLORIDE 0.9 %
1000 INTRAVENOUS SOLUTION INTRAVENOUS ONCE
Status: COMPLETED | OUTPATIENT
Start: 2021-03-16 | End: 2021-03-16

## 2021-03-16 RX ORDER — MORPHINE SULFATE 4 MG/ML
4 INJECTION, SOLUTION INTRAMUSCULAR; INTRAVENOUS ONCE
Status: COMPLETED | OUTPATIENT
Start: 2021-03-16 | End: 2021-03-16

## 2021-03-16 RX ORDER — METOCLOPRAMIDE 10 MG/1
10 TABLET ORAL 3 TIMES DAILY
Qty: 90 TABLET | Refills: 0 | Status: ON HOLD | OUTPATIENT
Start: 2021-03-16 | End: 2021-10-09 | Stop reason: HOSPADM

## 2021-03-16 RX ORDER — METOCLOPRAMIDE HYDROCHLORIDE 5 MG/ML
10 INJECTION INTRAMUSCULAR; INTRAVENOUS ONCE
Status: COMPLETED | OUTPATIENT
Start: 2021-03-16 | End: 2021-03-16

## 2021-03-16 RX ORDER — ONDANSETRON 4 MG/1
4 TABLET, ORALLY DISINTEGRATING ORAL EVERY 8 HOURS PRN
Qty: 20 TABLET | Refills: 0 | Status: SHIPPED | OUTPATIENT
Start: 2021-03-16 | End: 2021-10-19

## 2021-03-16 RX ORDER — DICYCLOMINE HCL 20 MG
20 TABLET ORAL 4 TIMES DAILY
Qty: 40 TABLET | Refills: 0 | Status: ON HOLD | OUTPATIENT
Start: 2021-03-16 | End: 2021-10-09 | Stop reason: HOSPADM

## 2021-03-16 RX ADMIN — SODIUM CHLORIDE 1000 ML: 9 INJECTION, SOLUTION INTRAVENOUS at 14:09

## 2021-03-16 RX ADMIN — MORPHINE SULFATE 4 MG: 4 INJECTION, SOLUTION INTRAMUSCULAR; INTRAVENOUS at 14:11

## 2021-03-16 RX ADMIN — FAMOTIDINE 20 MG: 10 INJECTION, SOLUTION INTRAVENOUS at 14:10

## 2021-03-16 RX ADMIN — ONDANSETRON 4 MG: 2 INJECTION INTRAMUSCULAR; INTRAVENOUS at 14:09

## 2021-03-16 RX ADMIN — METOCLOPRAMIDE 10 MG: 5 INJECTION, SOLUTION INTRAMUSCULAR; INTRAVENOUS at 16:11

## 2021-03-16 ASSESSMENT — PAIN SCALES - GENERAL
PAINLEVEL_OUTOF10: 9
PAINLEVEL_OUTOF10: 7

## 2021-03-16 NOTE — ED PROVIDER NOTES
[address] [place] [address] ED  [facility]      Pt Name: Peace Douglas  MRN: [id number]  [place] 1980  Date ofevaluation: 3/16/2021  Provider: Emily Tovar PA-C    CHIEF COMPLAINT       Chief Complaint   Patient presents with    Abdominal Pain    Emesis         HISTORY OF PRESENT ILLNESS  (Location/Symptom, Timing/Onset, Context/Setting, Quality, Duration, Modifying Factors, Severity.)   Peace Douglas is a 46 y.o. female who presents to the emergency department with abdominal pain vomiting and diarrhea for the last 5 days. Patient was seen on 11 with a negative CT scan for any acute process. States that she is out of her Zofran. States that she cannot keep anything down such as return to the ER. Denies any bloody or black stools. Abdominal pain described as mild, constant and crampy. Nursing Notes were reviewed.     ALLERGIES     Omeprazole    CURRENT MEDICATIONS       Previous Medications    ALBUTEROL SULFATE HFA (PROAIR HFA) 108 (90 BASE) MCG/ACT INHALER    INHALE TWO PUFFS BY MOUTH EVERY 6 HOURS AS NEEDED FOR WHEEZING    FAMOTIDINE (PEPCID) 20 MG TABLET    TAKE ONE TABLET BY MOUTH TWICE A DAY    LEVETIRACETAM (KEPPRA) 750 MG TABLET    TAKE ONE TABLET BY MOUTH TWICE A DAY    VITAMIN D (CHOLECALCIFEROL) 25 MCG (1000 UT) TABS TABLET    TAKE ONE TABLET BY MOUTH DAILY       PAST MEDICAL HISTORY         Diagnosis Date    GERD (gastroesophageal reflux disease)     Irritable bowel syndrome without diarrhea 2016    Seizures (HCC)     2013, 14       SURGICAL HISTORY           Procedure Laterality Date     SECTION          TUBAL LIGATION               FAMILY HISTORY           Problem Relation Age of Onset    High Cholesterol Mother     Birth Defects Neg Hx     Diabetes Neg Hx      Family Status   Relation Name Status    Mother  Alive    Father  Alive    Neg Hx  (Not Specified)        SOCIAL HISTORY      reports that she has been smoking cigarettes. She has a 10.00 pack-year smoking history. She has never used smokeless tobacco. She reports that she does not drink alcohol or use drugs. REVIEW OFSYSTEMS    (2-9 systems for level 4, 10 or more for level 5)   Review of Systems    Except as noted above the remainder of the review of systems was reviewed and negative. PHYSICAL EXAM    (up to 7 for level 4, 8 or more for level 5)     ED Triage Vitals   BP Temp Temp src Pulse Resp SpO2 Height Weight   03/16/21 1319 03/16/21 1319 -- 03/16/21 1319 03/16/21 1319 03/16/21 1319 -- 03/16/21 1318   (!) 129/90 97.5 °F (36.4 °C)  81 18 98 %  115 lb 12.8 oz (52.5 kg)      Physical Exam  Constitutional:       Appearance: She is well-developed. HENT:      Head: Normocephalic and atraumatic. Neck:      Musculoskeletal: Normal range of motion and neck supple. Cardiovascular:      Rate and Rhythm: Normal rate and regular rhythm. Pulmonary:      Effort: Pulmonary effort is normal.      Breath sounds: Normal breath sounds. Abdominal:      Palpations: Abdomen is soft. Tenderness: There is no abdominal tenderness. Musculoskeletal: Normal range of motion. Skin:     General: Skin is warm. Findings: No rash. Neurological:      Mental Status: She is alert and oriented to person, place, and time.    Psychiatric:         Behavior: Behavior normal.                 DIAGNOSTIC RESULTS     EKG: All EKG's are interpreted by the Emergency Department Physician who either signs or Co-signs this chart in the absence of a cardiologist.        RADIOLOGY:   Non-plain film images such as CT, Ultrasound and MRI are read by the radiologist. Plain radiographic images arevisualized and preliminarily interpreted by the emergency physician with the below findings:        Interpretation per the Radiologist below, if available at thetime of this note:          ED BEDSIDE ULTRASOUND:   Performed by ED Physician - none    LABS:  Labs Reviewed   CBC WITH AUTO DIFFERENTIAL - Abnormal; Notable for the following components:       Result Value    MCHC 36.6 (*)     RDW 11.7 (*)     Seg Neutrophils 68 (*)     All other components within normal limits   COMPREHENSIVE METABOLIC PANEL - Abnormal; Notable for the following components:    Glucose 119 (*)     Potassium 3.3 (*)     All other components within normal limits   URINE RT REFLEX TO CULTURE - Abnormal; Notable for the following components:    Turbidity UA SLIGHTLY CLOUDY (*)     Urine Hgb 3+ (*)     Protein, UA TRACE (*)     All other components within normal limits   MICROSCOPIC URINALYSIS - Abnormal; Notable for the following components:    Mucus, UA 1+ (*)     Amorphous, UA 2+ (*)     All other components within normal limits   FERRITIN - Abnormal; Notable for the following components:    Ferritin 176 (*)     All other components within normal limits   LIPASE   HCG, SERUM, QUALITATIVE   LACTATE DEHYDROGENASE   COVID-19   POCT URINALYSIS DIPSTICK       All other labs were within normal range or not returned as of this dictation. EMERGENCY DEPARTMENT COURSE and DIFFERENTIAL DIAGNOSIS/MDM:   Vitals:    Vitals:    03/16/21 1318 03/16/21 1319   BP:  (!) 129/90   Pulse:  81   Resp:  18   Temp:  97.5 °F (36.4 °C)   SpO2:  98%   Weight: 115 lb 12.8 oz (52.5 kg)      4:39 PM EDT  Tolerating jello and fluids by mouth. Patient will be discharged home. Feels much better after IV fluids. Abdominal exam is nonsurgical.        CONSULTS:  None    PROCEDURES:  Procedures        FINAL IMPRESSION      1.  Nausea vomiting and diarrhea          DISPOSITION/PLAN   DISPOSITION Decision To Discharge 03/16/2021 04:39:21 PM      PATIENTREFERRED TO:   Jason Pyle MD  [address] [facility]  21     In 3 days        DISCHARGE MEDICATIONS:     New Prescriptions    DICYCLOMINE (BENTYL) 20 MG TABLET    Take 1 tablet by mouth 4 times daily for 10 days    METOCLOPRAMIDE (REGLAN) 10 MG TABLET    Take 1 tablet by mouth 3 times daily WARNING:  May cause drowsiness. May impair ability to operate vehicles or machinery. Do not use in combination with alcohol.     ONDANSETRON (ZOFRAN ODT) 4 MG DISINTEGRATING TABLET    Take 1 tablet by mouth every 8 hours as needed for Nausea           (Please note that portions of this note were completed with a voice recognition program.  Efforts were made to edit thedictations but occasionally words are mis-transcribed.)    KARENA Patel PA-C  03/16/21 [id number]

## 2021-03-16 NOTE — ED PROVIDER NOTES
eMERGENCY dEPARTMENT eNCOUnter   Independent Attestation     Pt Name: Andriy Bonds  MRN: [id number]  [place] 1980  Date of evaluation: 3/16/21     Andriy Bonds is a 46 y.o. female with CC: Abdominal Pain and Emesis      Based on the medical record the care appears appropriate. I was personally available for consultation in the Emergency Department.     Ludivina Hancock MD  Attending Emergency Physician                    Ludivina Hancock MD  03/16/21 8803

## 2021-03-17 LAB
SARS-COV-2: NORMAL
SARS-COV-2: NOT DETECTED
SOURCE: NORMAL

## 2021-04-23 DIAGNOSIS — K21.9 GASTROESOPHAGEAL REFLUX DISEASE WITHOUT ESOPHAGITIS: ICD-10-CM

## 2021-04-23 NOTE — TELEPHONE ENCOUNTER
Request for pepcid. Last Visit Date: 6/11/2021  Next Visit Date: Patient asked to follow up in 1 year. No future appointments.     Health Maintenance   Topic Date Due    Hepatitis C screen  Never done    COVID-19 Vaccine (1) Never done   [employer] Visit (AWV)  Never done    Cervical cancer screen  02/05/2021    Flu vaccine (Season Ended) 09/01/2021    DTaP/Tdap/Td vaccine (2 - Td) 09/13/2026    Pneumococcal 0-64 years Vaccine  Completed    HIV screen  Completed    Hepatitis A vaccine  Aged Out    Hepatitis B vaccine  Aged Out    Hib vaccine  Aged Out    Meningococcal (ACWY) vaccine  Aged Out    Varicella vaccine  Discontinued       No results found for: LABA1C          ( goal A1C is < 7)   No results found for: LABMICR  LDL Cholesterol (mg/dL)   Date Value   06/11/2020 97       (goal LDL is <100)   AST (U/L)   Date Value   03/16/2021 17     ALT (U/L)   Date Value   03/16/2021 16     BUN (mg/dL)   Date Value   03/16/2021 11     BP Readings from Last 3 Encounters:   03/16/21 (!) 129/90   03/11/21 132/69   06/11/20 108/71          (goal 120/80)    All Future Testing planned in [facility]        Patient Active Problem List:     GERD (gastroesophageal reflux disease)     Mental retardation     Seizure (HCC)     Irritable bowel syndrome without diarrhea

## 2021-04-24 RX ORDER — FAMOTIDINE 20 MG/1
TABLET, FILM COATED ORAL
Qty: 60 TABLET | Refills: 3 | Status: SHIPPED | OUTPATIENT
Start: 2021-04-24 | End: 2021-09-09

## 2021-05-17 DIAGNOSIS — J45.20 MILD INTERMITTENT REACTIVE AIRWAY DISEASE WITHOUT COMPLICATION: ICD-10-CM

## 2021-05-17 DIAGNOSIS — R56.01 COMPLEX FEBRILE CONVULSION (HCC): ICD-10-CM

## 2021-05-17 DIAGNOSIS — E55.9 VITAMIN D DEFICIENCY: ICD-10-CM

## 2021-05-18 NOTE — TELEPHONE ENCOUNTER
Request for Multiple meds please refill if appropriate. Last Visit Date: 6/11/2020  Next Visit Date: Patient advised to follow up in 1 year. No future appointments.     Health Maintenance   Topic Date Due    Hepatitis C screen  Never done    COVID-19 Vaccine (1) Never done   [employer] Visit (AWV)  Never done    Cervical cancer screen  02/05/2021    Flu vaccine (Season Ended) 09/01/2021    DTaP/Tdap/Td vaccine (2 - Td) 09/13/2026    Pneumococcal 0-64 years Vaccine (2 of 2) 02/27/2047    HIV screen  Completed    Hepatitis A vaccine  Aged Out    Hepatitis B vaccine  Aged Out    Hib vaccine  Aged Out    Meningococcal (ACWY) vaccine  Aged Out    Varicella vaccine  Discontinued       No results found for: LABA1C          ( goal A1C is < 7)   No results found for: LABMICR  LDL Cholesterol (mg/dL)   Date Value   06/11/2020 97       (goal LDL is <100)   AST (U/L)   Date Value   03/16/2021 17     ALT (U/L)   Date Value   03/16/2021 16     BUN (mg/dL)   Date Value   03/16/2021 11     BP Readings from Last 3 Encounters:   03/16/21 (!) 129/90   03/11/21 132/69   06/11/20 108/71          (goal 120/80)    All Future Testing planned in [facility]        Patient Active Problem List:     GERD (gastroesophageal reflux disease)     Mental retardation     Seizure (HCC)     Irritable bowel syndrome without diarrhea

## 2021-05-20 RX ORDER — MELATONIN
Qty: 30 TABLET | Refills: 3 | Status: SHIPPED | OUTPATIENT
Start: 2021-05-20 | End: 2021-09-09

## 2021-05-20 RX ORDER — ALBUTEROL SULFATE 90 UG/1
AEROSOL, METERED RESPIRATORY (INHALATION)
Qty: 8.5 G | Refills: 4 | Status: SHIPPED | OUTPATIENT
Start: 2021-05-20 | End: 2021-10-07

## 2021-05-20 RX ORDER — LEVETIRACETAM 750 MG/1
TABLET ORAL
Qty: 60 TABLET | Refills: 3 | Status: SHIPPED | OUTPATIENT
Start: 2021-05-20 | End: 2021-09-09

## 2021-09-05 DIAGNOSIS — R56.01 COMPLEX FEBRILE CONVULSION (HCC): ICD-10-CM

## 2021-09-05 DIAGNOSIS — K21.9 GASTROESOPHAGEAL REFLUX DISEASE WITHOUT ESOPHAGITIS: ICD-10-CM

## 2021-09-05 DIAGNOSIS — E55.9 VITAMIN D DEFICIENCY: ICD-10-CM

## 2021-09-09 RX ORDER — FAMOTIDINE 20 MG/1
TABLET, FILM COATED ORAL
Qty: 60 TABLET | Refills: 3 | Status: ON HOLD
Start: 2021-09-09 | End: 2021-10-09 | Stop reason: HOSPADM

## 2021-09-09 RX ORDER — MELATONIN
Qty: 30 TABLET | Refills: 3 | Status: SHIPPED | OUTPATIENT
Start: 2021-09-09 | End: 2022-01-25 | Stop reason: SDUPTHER

## 2021-09-09 RX ORDER — LEVETIRACETAM 750 MG/1
TABLET ORAL
Qty: 60 TABLET | Refills: 3 | Status: SHIPPED | OUTPATIENT
Start: 2021-09-09 | End: 2022-01-14

## 2021-09-13 ENCOUNTER — HOSPITAL ENCOUNTER (EMERGENCY)
Age: 41
Discharge: HOME | End: 2021-09-13
Attending: EMERGENCY MEDICINE
Payer: COMMERCIAL

## 2021-09-13 ENCOUNTER — APPOINTMENT (OUTPATIENT)
Dept: CT IMAGING | Age: 41
End: 2021-09-13
Payer: COMMERCIAL

## 2021-09-13 VITALS
BODY MASS INDEX: 19.96 KG/M2 | HEART RATE: 64 BPM | OXYGEN SATURATION: 97 % | HEIGHT: 62 IN | SYSTOLIC BLOOD PRESSURE: 124 MMHG | RESPIRATION RATE: 18 BRPM | TEMPERATURE: 97.6 F | WEIGHT: 108.5 LBS | DIASTOLIC BLOOD PRESSURE: 95 MMHG

## 2021-09-13 DIAGNOSIS — E87.6 HYPOKALEMIA: ICD-10-CM

## 2021-09-13 DIAGNOSIS — R11.2 NON-INTRACTABLE VOMITING WITH NAUSEA, UNSPECIFIED VOMITING TYPE: Primary | ICD-10-CM

## 2021-09-13 LAB
-: ABNORMAL
ABSOLUTE EOS #: 0.09 K/UL (ref 0–0.44)
ABSOLUTE IMMATURE GRANULOCYTE: 0.05 K/UL (ref 0–0.3)
ABSOLUTE LYMPH #: 2.63 K/UL (ref 1.1–3.7)
ABSOLUTE MONO #: 0.73 K/UL (ref 0.1–1.2)
ALBUMIN SERPL-MCNC: 4.8 G/DL (ref 3.5–5.2)
ALBUMIN/GLOBULIN RATIO: NORMAL (ref 1–2.5)
ALP BLD-CCNC: 53 U/L (ref 35–104)
ALT SERPL-CCNC: 13 U/L (ref 5–33)
AMORPHOUS: ABNORMAL
ANION GAP SERPL CALCULATED.3IONS-SCNC: 15 MMOL/L (ref 9–17)
AST SERPL-CCNC: 17 U/L
BACTERIA: ABNORMAL
BASOPHILS # BLD: 1 % (ref 0–2)
BASOPHILS ABSOLUTE: 0.06 K/UL (ref 0–0.2)
BILIRUB SERPL-MCNC: 1.11 MG/DL (ref 0.3–1.2)
BILIRUBIN DIRECT: 0.18 MG/DL
BILIRUBIN URINE: NEGATIVE
BILIRUBIN, INDIRECT: 0.93 MG/DL (ref 0–1)
BUN BLDV-MCNC: 14 MG/DL (ref 6–20)
BUN/CREAT BLD: 15 (ref 9–20)
CALCIUM SERPL-MCNC: 9.8 MG/DL (ref 8.6–10.4)
CASTS UA: ABNORMAL /LPF
CHLORIDE BLD-SCNC: 97 MMOL/L (ref 98–107)
CHP ED QC CHECK: NORMAL
CO2: 24 MMOL/L (ref 20–31)
COLOR: YELLOW
COMMENT UA: ABNORMAL
CREAT SERPL-MCNC: 0.92 MG/DL (ref 0.5–0.9)
CRYSTALS, UA: ABNORMAL /HPF
DIFFERENTIAL TYPE: ABNORMAL
EOSINOPHILS RELATIVE PERCENT: 1 % (ref 1–4)
EPITHELIAL CELLS UA: ABNORMAL /HPF (ref 0–5)
GFR AFRICAN AMERICAN: >60 ML/MIN
GFR NON-AFRICAN AMERICAN: >60 ML/MIN
GFR SERPL CREATININE-BSD FRML MDRD: ABNORMAL ML/MIN/{1.73_M2}
GFR SERPL CREATININE-BSD FRML MDRD: ABNORMAL ML/MIN/{1.73_M2}
GLOBULIN: NORMAL G/DL (ref 1.5–3.8)
GLUCOSE BLD-MCNC: 129 MG/DL (ref 70–99)
GLUCOSE URINE: NEGATIVE
HCT VFR BLD CALC: 43.6 % (ref 36.3–47.1)
HEMOGLOBIN: 15.7 G/DL (ref 11.9–15.1)
IMMATURE GRANULOCYTES: 0 %
KETONES, URINE: ABNORMAL
LEUKOCYTE ESTERASE, URINE: NEGATIVE
LIPASE: 14 U/L (ref 13–60)
LYMPHOCYTES # BLD: 23 % (ref 24–43)
MCH RBC QN AUTO: 30.3 PG (ref 25.2–33.5)
MCHC RBC AUTO-ENTMCNC: 36 G/DL (ref 28.4–34.8)
MCV RBC AUTO: 84.2 FL (ref 82.6–102.9)
MONOCYTES # BLD: 6 % (ref 3–12)
MUCUS: ABNORMAL
NITRITE, URINE: NEGATIVE
NRBC AUTOMATED: 0 PER 100 WBC
OTHER OBSERVATIONS UA: ABNORMAL
PDW BLD-RTO: 12.2 % (ref 11.8–14.4)
PH UA: 6 (ref 5–8)
PLATELET # BLD: 343 K/UL (ref 138–453)
PLATELET ESTIMATE: ABNORMAL
PMV BLD AUTO: 9.7 FL (ref 8.1–13.5)
POTASSIUM SERPL-SCNC: 3.2 MMOL/L (ref 3.7–5.3)
PREGNANCY TEST URINE, POC: NORMAL
PROTEIN UA: ABNORMAL
RBC # BLD: 5.18 M/UL (ref 3.95–5.11)
RBC # BLD: ABNORMAL 10*6/UL
RBC UA: ABNORMAL /HPF (ref 0–2)
RENAL EPITHELIAL, UA: ABNORMAL /HPF
SEG NEUTROPHILS: 69 % (ref 36–65)
SEGMENTED NEUTROPHILS ABSOLUTE COUNT: 7.93 K/UL (ref 1.5–8.1)
SODIUM BLD-SCNC: 136 MMOL/L (ref 135–144)
SPECIFIC GRAVITY UA: 1.02 (ref 1–1.03)
TOTAL PROTEIN: 7.8 G/DL (ref 6.4–8.3)
TRICHOMONAS: ABNORMAL
TURBIDITY: ABNORMAL
URINE HGB: NEGATIVE
UROBILINOGEN, URINE: NORMAL
WBC # BLD: 11.5 K/UL (ref 3.5–11.3)
WBC # BLD: ABNORMAL 10*3/UL
WBC UA: ABNORMAL /HPF (ref 0–5)
YEAST: ABNORMAL

## 2021-09-13 PROCEDURE — 2580000003 HC RX 258: Performed by: EMERGENCY MEDICINE

## 2021-09-13 PROCEDURE — 96374 THER/PROPH/DIAG INJ IV PUSH: CPT

## 2021-09-13 PROCEDURE — 99282 EMERGENCY DEPT VISIT SF MDM: CPT

## 2021-09-13 PROCEDURE — 80076 HEPATIC FUNCTION PANEL: CPT

## 2021-09-13 PROCEDURE — 6360000004 HC RX CONTRAST MEDICATION: Performed by: EMERGENCY MEDICINE

## 2021-09-13 PROCEDURE — 85025 COMPLETE CBC W/AUTO DIFF WBC: CPT

## 2021-09-13 PROCEDURE — 96375 TX/PRO/DX INJ NEW DRUG ADDON: CPT

## 2021-09-13 PROCEDURE — 80048 BASIC METABOLIC PNL TOTAL CA: CPT

## 2021-09-13 PROCEDURE — 83690 ASSAY OF LIPASE: CPT

## 2021-09-13 PROCEDURE — 74177 CT ABD & PELVIS W/CONTRAST: CPT

## 2021-09-13 PROCEDURE — 81001 URINALYSIS AUTO W/SCOPE: CPT

## 2021-09-13 PROCEDURE — 6360000002 HC RX W HCPCS: Performed by: EMERGENCY MEDICINE

## 2021-09-13 RX ORDER — 0.9 % SODIUM CHLORIDE 0.9 %
1000 INTRAVENOUS SOLUTION INTRAVENOUS ONCE
Status: COMPLETED | OUTPATIENT
Start: 2021-09-13 | End: 2021-09-13

## 2021-09-13 RX ORDER — ONDANSETRON 4 MG/1
4 TABLET, ORALLY DISINTEGRATING ORAL EVERY 8 HOURS PRN
Qty: 20 TABLET | Refills: 0 | Status: SHIPPED | OUTPATIENT
Start: 2021-09-13 | End: 2022-01-25 | Stop reason: SDUPTHER

## 2021-09-13 RX ORDER — SODIUM CHLORIDE 0.9 % (FLUSH) 0.9 %
10 SYRINGE (ML) INJECTION ONCE
Status: COMPLETED | OUTPATIENT
Start: 2021-09-13 | End: 2021-09-13

## 2021-09-13 RX ORDER — DICYCLOMINE HYDROCHLORIDE 10 MG/1
10 CAPSULE ORAL EVERY 6 HOURS PRN
Qty: 20 CAPSULE | Refills: 0 | Status: SHIPPED | OUTPATIENT
Start: 2021-09-13 | End: 2021-10-19

## 2021-09-13 RX ORDER — POTASSIUM CHLORIDE 20 MEQ/1
20 TABLET, EXTENDED RELEASE ORAL DAILY
Qty: 5 TABLET | Refills: 0 | Status: SHIPPED | OUTPATIENT
Start: 2021-09-13 | End: 2021-10-19

## 2021-09-13 RX ORDER — FENTANYL CITRATE 50 UG/ML
25 INJECTION, SOLUTION INTRAMUSCULAR; INTRAVENOUS ONCE
Status: COMPLETED | OUTPATIENT
Start: 2021-09-13 | End: 2021-09-13

## 2021-09-13 RX ORDER — ONDANSETRON 2 MG/ML
4 INJECTION INTRAMUSCULAR; INTRAVENOUS ONCE
Status: COMPLETED | OUTPATIENT
Start: 2021-09-13 | End: 2021-09-13

## 2021-09-13 RX ORDER — 0.9 % SODIUM CHLORIDE 0.9 %
80 INTRAVENOUS SOLUTION INTRAVENOUS ONCE
Status: COMPLETED | OUTPATIENT
Start: 2021-09-13 | End: 2021-09-13

## 2021-09-13 RX ADMIN — SODIUM CHLORIDE 80 ML: 9 INJECTION, SOLUTION INTRAVENOUS at 09:35

## 2021-09-13 RX ADMIN — FENTANYL CITRATE 25 MCG: 50 INJECTION, SOLUTION INTRAMUSCULAR; INTRAVENOUS at 08:19

## 2021-09-13 RX ADMIN — ONDANSETRON 4 MG: 2 INJECTION INTRAMUSCULAR; INTRAVENOUS at 08:19

## 2021-09-13 RX ADMIN — SODIUM CHLORIDE 1000 ML: 9 INJECTION, SOLUTION INTRAVENOUS at 08:19

## 2021-09-13 RX ADMIN — SODIUM CHLORIDE, PRESERVATIVE FREE 10 ML: 5 INJECTION INTRAVENOUS at 09:36

## 2021-09-13 RX ADMIN — SODIUM CHLORIDE 1000 ML: 9 INJECTION, SOLUTION INTRAVENOUS at 09:24

## 2021-09-13 RX ADMIN — IOPAMIDOL 75 ML: 755 INJECTION, SOLUTION INTRAVENOUS at 09:28

## 2021-09-13 ASSESSMENT — PAIN SCALES - GENERAL: PAINLEVEL_OUTOF10: 10

## 2021-09-13 ASSESSMENT — ENCOUNTER SYMPTOMS
EYE DISCHARGE: 0
EYE REDNESS: 0
DIARRHEA: 0
VOMITING: 1
COUGH: 0
SORE THROAT: 0
RHINORRHEA: 0
COLOR CHANGE: 0
NAUSEA: 1
ABDOMINAL PAIN: 1
SHORTNESS OF BREATH: 0

## 2021-09-13 NOTE — ED PROVIDER NOTES
EMERGENCY DEPARTMENT ENCOUNTER    Pt Name: Peggy Bai  MRN: [id number]  [place] 1980  Date of evaluation: 21  CHIEF COMPLAINT       Chief Complaint   Patient presents with    Emesis    Abdominal Pain     HISTORY OF PRESENT ILLNESS   72-year-old presents with complaints of abdominal pain nausea and vomiting. Patient states that she began having some episodes of emesis and vomiting on Friday and has been vomiting since then with not able to tolerate oral intake. Patient denies any fevers or chills, she complains of associated lower abdominal pain. Pain is severe, without any specific aggravating or alleviating factors. REVIEW OF SYSTEMS     Review of Systems   Constitutional: Negative for chills and fever. HENT: Negative for rhinorrhea and sore throat. Eyes: Negative for discharge, redness and visual disturbance. Respiratory: Negative for cough and shortness of breath. Cardiovascular: Negative for chest pain, palpitations and leg swelling. Gastrointestinal: Positive for abdominal pain, nausea and vomiting. Negative for diarrhea. Genitourinary: Negative for dysuria and hematuria. Musculoskeletal: Negative for arthralgias, myalgias and neck pain. Skin: Negative for color change and rash. Neurological: Negative for seizures, weakness and headaches. Psychiatric/Behavioral: Negative for hallucinations, self-injury and suicidal ideas.      PASTMEDICAL HISTORY     Past Medical History:   Diagnosis Date    GERD (gastroesophageal reflux disease)     Irritable bowel syndrome without diarrhea 2016    Seizures ([facility] [address]     2013, 14     Past Problem List  Patient Active Problem List   Diagnosis Code    GERD (gastroesophageal reflux disease) K21.9    Mental retardation F79    Seizure ([facility] [address] R56.9    Irritable bowel syndrome without diarrhea K58.9     SURGICAL HISTORY       Past Surgical History:   Procedure Laterality Date     SECTION          TUBAL LIGATION           CURRENT MEDICATIONS       Previous Medications    ALBUTEROL SULFATE  (90 BASE) MCG/ACT INHALER    INHALE TWO PUFFS BY MOUTH EVERY 6 HOURS AS NEEDED FOR WHEEZING    DICYCLOMINE (BENTYL) 20 MG TABLET    Take 1 tablet by mouth 4 times daily for 10 days    FAMOTIDINE (PEPCID) 20 MG TABLET    TAKE ONE TABLET BY MOUTH TWICE A DAY    LEVETIRACETAM (KEPPRA) 750 MG TABLET    TAKE ONE TABLET BY MOUTH TWICE A DAY    METOCLOPRAMIDE (REGLAN) 10 MG TABLET    Take 1 tablet by mouth 3 times daily WARNING:  May cause drowsiness. May impair ability to operate vehicles or machinery. Do not use in combination with alcohol. ONDANSETRON (ZOFRAN ODT) 4 MG DISINTEGRATING TABLET    Take 1 tablet by mouth every 8 hours as needed for Nausea    VITAMIN D3 (CHOLECALCIFEROL) 25 MCG (1000 UT) TABS TABLET    ONE BY MOUTH DAILY     ALLERGIES     is allergic to omeprazole. FAMILY HISTORY     She indicated that her mother is alive. She indicated that her father is alive. She indicated that the status of her neg hx is unknown. SOCIAL HISTORY       Social History     Tobacco Use    Smoking status: Current Every Day Smoker     Packs/day: 1.00     Years: 10.00     Pack years: 10.00     Types: Cigarettes     Last attempt to quit: 3/7/2014     Years since quittin.5    Smokeless tobacco: Never Used    Tobacco comment: denies current use    Substance Use Topics    Alcohol use: No    Drug use: Yes     Types: Marijuana     PHYSICAL EXAM     INITIAL VITALS: BP (!) 124/95   Pulse 64   Temp 97.6 °F (36.4 °C) (Oral)   Resp 18   Ht 5' 2\" (1.575 m)   Wt 108 lb 8 oz (49.2 kg)   LMP 2021   SpO2 97%   BMI 19.84 kg/m²    Physical Exam  Constitutional:       Appearance: Normal appearance. She is well-developed. She is not ill-appearing or toxic-appearing. HENT:      Head: Normocephalic and atraumatic.    Eyes:      Conjunctiva/sclera: Conjunctivae normal.      Pupils: Pupils are equal, round, and reactive to light. Neck:      Trachea: Trachea normal.   Cardiovascular:      Rate and Rhythm: Normal rate and regular rhythm. Heart sounds: S1 normal and S2 normal. No murmur heard. Pulmonary:      Effort: Pulmonary effort is normal. No accessory muscle usage or respiratory distress. Breath sounds: Normal breath sounds. Chest:      Chest wall: No deformity or tenderness. Abdominal:      General: Bowel sounds are normal. There is no distension or abdominal bruit. Palpations: Abdomen is not rigid. Tenderness: There is abdominal tenderness in the right lower quadrant, suprapubic area and left lower quadrant. There is no guarding or rebound. Negative signs include Melgar's sign and McBurney's sign. Musculoskeletal:      Cervical back: Normal range of motion and neck supple. Skin:     General: Skin is warm. Findings: No rash. Neurological:      Mental Status: She is alert and oriented to person, place, and time. GCS: GCS eye subscore is 4. GCS verbal subscore is 5. GCS motor subscore is 6. Psychiatric:         Speech: Speech normal.         MEDICAL DECISION MAKIN-year-old female, abdominal pain nausea and vomiting. Plan is basic labs IV fluids pain control and reevaluation. 11:55 AM EDT  Patient's CT scan and laboratory studies unremarkable, plan is discharged with outpatient follow-up. CRITICAL CARE:       PROCEDURES:    Procedures    DIAGNOSTIC RESULTS   EKG:All EKG's are interpreted by the Emergency Department Physician who either signs or Co-signs this chart in the absence of a cardiologist.        RADIOLOGY:All plain film, CT, MRI, and formal ultrasound images (except ED bedside ultrasound) are read by the radiologist, see reports below, unless otherwisenoted in MDM or here. CT ABDOMEN PELVIS W IV CONTRAST Additional Contrast? None   Final Result   No CT evidence of acute intra-abdominal process.       Stable 4.3 cm pedunculated fibroid arising from the uterine fundus. Pelvic   ultrasound to help for further characterization. Polycystic kidney disease. Small nonobstructing stones in both kidneys. LABS: All lab results were reviewed by myself, and all abnormals are listed below.   Labs Reviewed   BASIC METABOLIC PANEL - Abnormal; Notable for the following components:       Result Value    Glucose 129 (*)     CREATININE 0.92 (*)     Potassium 3.2 (*)     Chloride 97 (*)     All other components within normal limits   CBC WITH AUTO DIFFERENTIAL - Abnormal; Notable for the following components:    WBC 11.5 (*)     RBC 5.18 (*)     Hemoglobin 15.7 (*)     MCHC 36.0 (*)     Seg Neutrophils 69 (*)     Lymphocytes 23 (*)     All other components within normal limits   URINALYSIS - Abnormal; Notable for the following components:    Turbidity UA CLOUDY (*)     Ketones, Urine 1+ (*)     Protein, UA 1+ (*)     All other components within normal limits   MICROSCOPIC URINALYSIS - Abnormal; Notable for the following components:    Bacteria, UA FEW (*)     Mucus, UA 2+ (*)     All other components within normal limits   POCT URINE PREGNANCY - Normal   HEPATIC FUNCTION PANEL   LIPASE       EMERGENCY DEPARTMENTCOURSE:         Vitals:    Vitals:    09/13/21 0757   BP: (!) 124/95   Pulse: 64   Resp: 18   Temp: 97.6 °F (36.4 °C)   TempSrc: Oral   SpO2: 97%   Weight: 108 lb 8 oz (49.2 kg)   Height: 5' 2\" (1.575 m)       The patient was given the following medications while in the emergency department:  Orders Placed This Encounter   Medications    ondansetron (ZOFRAN) injection 4 mg    0.9 % sodium chloride bolus    fentaNYL (SUBLIMAZE) injection 25 mcg    0.9 % sodium chloride bolus    0.9 % sodium chloride bolus    iopamidol (ISOVUE-370) 76 % injection 75 mL    sodium chloride flush 0.9 % injection 10 mL    ondansetron (ZOFRAN ODT) 4 MG disintegrating tablet     Sig: Take 1 tablet by mouth every 8 hours as needed for Nausea     Dispense:  20 tablet Refill:  0    dicyclomine (BENTYL) 10 MG capsule     Sig: Take 1 capsule by mouth every 6 hours as needed (cramps)     Dispense:  20 capsule     Refill:  0    potassium chloride (KLOR-CON M) 20 MEQ extended release tablet     Sig: Take 1 tablet by mouth daily for 5 days     Dispense:  5 tablet     Refill:  0     CONSULTS:  None    FINAL IMPRESSION      1. Non-intractable vomiting with nausea, unspecified vomiting type    2.  Hypokalemia          DISPOSITION/PLAN   DISPOSITION Decision To Discharge 09/13/2021 11:50:49 AM      PATIENT REFERRED TO:  Shelby Randolph MD  [address] [id number] [phone]    Schedule an appointment as soon as possible for a visit in 2 days      DISCHARGE MEDICATIONS:  New Prescriptions    DICYCLOMINE (BENTYL) 10 MG CAPSULE    Take 1 capsule by mouth every 6 hours as needed (cramps)    ONDANSETRON (ZOFRAN ODT) 4 MG DISINTEGRATING TABLET    Take 1 tablet by mouth every 8 hours as needed for Nausea    POTASSIUM CHLORIDE (KLOR-CON M) 20 MEQ EXTENDED RELEASE TABLET    Take 1 tablet by mouth daily for 5 days     Armand Rojas MD  Attending Emergency Physician                   Armand Rojas MD  09/13/21 9305

## 2021-10-06 DIAGNOSIS — J45.20 MILD INTERMITTENT REACTIVE AIRWAY DISEASE WITHOUT COMPLICATION: ICD-10-CM

## 2021-10-06 NOTE — TELEPHONE ENCOUNTER
Request for Inhaler.       Next Visit Date:  Future Appointments   Date Time Provider Rachel Lucille   10/19/2021 10:50 AM Mishel Mar MD [facility] [address] Maintenance   Topic Date Due    Hepatitis C screen  Never done    Annual Wellness Visit (AWV)  Never done    Cervical cancer screen  02/05/2021    Flu vaccine (1) 09/01/2021    DTaP/Tdap/Td vaccine (2 - Td or Tdap) 09/13/2026    Pneumococcal 0-64 years Vaccine (2 of 2 - PPSV23) 02/27/2047    COVID-19 Vaccine  Completed    HIV screen  Completed    Hepatitis A vaccine  Aged Out    Hepatitis B vaccine  Aged Out    Hib vaccine  Aged Out    Meningococcal (ACWY) vaccine  Aged Out    Varicella vaccine  Discontinued       No results found for: LABA1C          ( goal A1C is < 7)   No results found for: LABMICR  LDL Cholesterol (mg/dL)   Date Value   06/11/2020 97       (goal LDL is <100)   AST (U/L)   Date Value   09/13/2021 17     ALT (U/L)   Date Value   09/13/2021 13     BUN (mg/dL)   Date Value   09/13/2021 14     BP Readings from Last 3 Encounters:   09/13/21 (!) 124/95   03/16/21 (!) 129/90   03/11/21 132/69          (goal 120/80)    All Future Testing planned in [facility]        Patient Active Problem List:     GERD (gastroesophageal reflux disease)     Mental retardation     Seizure (HCC)     Irritable bowel syndrome without diarrhea

## 2021-10-07 ENCOUNTER — HOSPITAL ENCOUNTER (INPATIENT)
Age: 41
LOS: 3 days | Discharge: HOME | DRG: 392 | End: 2021-10-10
Attending: EMERGENCY MEDICINE | Admitting: FAMILY MEDICINE
Payer: COMMERCIAL

## 2021-10-07 DIAGNOSIS — R11.2 INTRACTABLE VOMITING WITH NAUSEA, UNSPECIFIED VOMITING TYPE: Primary | ICD-10-CM

## 2021-10-07 PROBLEM — F12.90 MARIJUANA USE: Status: ACTIVE | Noted: 2021-10-07

## 2021-10-07 LAB
ABSOLUTE EOS #: 0.08 K/UL (ref 0–0.44)
ABSOLUTE IMMATURE GRANULOCYTE: 0.02 K/UL (ref 0–0.3)
ABSOLUTE LYMPH #: 1.6 K/UL (ref 1.1–3.7)
ABSOLUTE MONO #: 0.23 K/UL (ref 0.1–1.2)
ALBUMIN SERPL-MCNC: 4.6 G/DL (ref 3.5–5.2)
ALBUMIN/GLOBULIN RATIO: NORMAL (ref 1–2.5)
ALP BLD-CCNC: 47 U/L (ref 35–104)
ALT SERPL-CCNC: 11 U/L (ref 5–33)
AMYLASE: 91 U/L (ref 28–100)
ANION GAP SERPL CALCULATED.3IONS-SCNC: 13 MMOL/L (ref 9–17)
AST SERPL-CCNC: 19 U/L
BASOPHILS # BLD: 1 % (ref 0–2)
BASOPHILS ABSOLUTE: 0.04 K/UL (ref 0–0.2)
BILIRUB SERPL-MCNC: 0.3 MG/DL (ref 0.3–1.2)
BILIRUBIN DIRECT: 0.08 MG/DL
BILIRUBIN, INDIRECT: 0.22 MG/DL (ref 0–1)
BUN BLDV-MCNC: 8 MG/DL (ref 6–20)
BUN/CREAT BLD: 11 (ref 9–20)
CALCIUM SERPL-MCNC: 9.2 MG/DL (ref 8.6–10.4)
CHLORIDE BLD-SCNC: 99 MMOL/L (ref 98–107)
CO2: 24 MMOL/L (ref 20–31)
CREAT SERPL-MCNC: 0.7 MG/DL (ref 0.5–0.9)
DIFFERENTIAL TYPE: ABNORMAL
EOSINOPHILS RELATIVE PERCENT: 1 % (ref 1–4)
GFR AFRICAN AMERICAN: >60 ML/MIN
GFR NON-AFRICAN AMERICAN: >60 ML/MIN
GFR SERPL CREATININE-BSD FRML MDRD: ABNORMAL ML/MIN/{1.73_M2}
GFR SERPL CREATININE-BSD FRML MDRD: ABNORMAL ML/MIN/{1.73_M2}
GLOBULIN: NORMAL G/DL (ref 1.5–3.8)
GLUCOSE BLD-MCNC: 126 MG/DL (ref 70–99)
HCG QUALITATIVE: NEGATIVE
HCT VFR BLD CALC: 37.7 % (ref 36.3–47.1)
HEMOGLOBIN: 13.4 G/DL (ref 11.9–15.1)
IMMATURE GRANULOCYTES: 0 %
LIPASE: 12 U/L (ref 13–60)
LYMPHOCYTES # BLD: 26 % (ref 24–43)
MCH RBC QN AUTO: 30.5 PG (ref 25.2–33.5)
MCHC RBC AUTO-ENTMCNC: 35.5 G/DL (ref 28.4–34.8)
MCV RBC AUTO: 85.7 FL (ref 82.6–102.9)
MONOCYTES # BLD: 4 % (ref 3–12)
NRBC AUTOMATED: ABNORMAL PER 100 WBC
PDW BLD-RTO: 11.9 % (ref 11.8–14.4)
PLATELET # BLD: 303 K/UL (ref 138–453)
PLATELET ESTIMATE: ABNORMAL
PMV BLD AUTO: 10 FL (ref 8.1–13.5)
POTASSIUM SERPL-SCNC: 3.4 MMOL/L (ref 3.7–5.3)
RBC # BLD: 4.4 M/UL (ref 3.95–5.11)
RBC # BLD: ABNORMAL 10*6/UL
SEG NEUTROPHILS: 68 % (ref 36–65)
SEGMENTED NEUTROPHILS ABSOLUTE COUNT: 4.08 K/UL (ref 1.5–8.1)
SODIUM BLD-SCNC: 136 MMOL/L (ref 135–144)
TOTAL PROTEIN: 7.3 G/DL (ref 6.4–8.3)
WBC # BLD: 6.1 K/UL (ref 3.5–11.3)
WBC # BLD: ABNORMAL 10*3/UL

## 2021-10-07 PROCEDURE — 6370000000 HC RX 637 (ALT 250 FOR IP): Performed by: FAMILY MEDICINE

## 2021-10-07 PROCEDURE — 80048 BASIC METABOLIC PNL TOTAL CA: CPT

## 2021-10-07 PROCEDURE — 84703 CHORIONIC GONADOTROPIN ASSAY: CPT

## 2021-10-07 PROCEDURE — 99222 1ST HOSP IP/OBS MODERATE 55: CPT | Performed by: FAMILY MEDICINE

## 2021-10-07 PROCEDURE — 96376 TX/PRO/DX INJ SAME DRUG ADON: CPT

## 2021-10-07 PROCEDURE — 83690 ASSAY OF LIPASE: CPT

## 2021-10-07 PROCEDURE — 6360000002 HC RX W HCPCS: Performed by: EMERGENCY MEDICINE

## 2021-10-07 PROCEDURE — 80076 HEPATIC FUNCTION PANEL: CPT

## 2021-10-07 PROCEDURE — 99283 EMERGENCY DEPT VISIT LOW MDM: CPT

## 2021-10-07 PROCEDURE — 2580000003 HC RX 258: Performed by: FAMILY MEDICINE

## 2021-10-07 PROCEDURE — 82150 ASSAY OF AMYLASE: CPT

## 2021-10-07 PROCEDURE — 85025 COMPLETE CBC W/AUTO DIFF WBC: CPT

## 2021-10-07 PROCEDURE — 1200000000 HC SEMI PRIVATE

## 2021-10-07 PROCEDURE — 6360000002 HC RX W HCPCS: Performed by: FAMILY MEDICINE

## 2021-10-07 PROCEDURE — 2580000003 HC RX 258: Performed by: EMERGENCY MEDICINE

## 2021-10-07 PROCEDURE — G0378 HOSPITAL OBSERVATION PER HR: HCPCS

## 2021-10-07 PROCEDURE — 96374 THER/PROPH/DIAG INJ IV PUSH: CPT

## 2021-10-07 RX ORDER — ONDANSETRON 4 MG/1
4 TABLET, ORALLY DISINTEGRATING ORAL EVERY 8 HOURS PRN
Status: DISCONTINUED | OUTPATIENT
Start: 2021-10-07 | End: 2021-10-10 | Stop reason: HOSPADM

## 2021-10-07 RX ORDER — SODIUM CHLORIDE 9 MG/ML
INJECTION, SOLUTION INTRAVENOUS CONTINUOUS
Status: DISCONTINUED | OUTPATIENT
Start: 2021-10-07 | End: 2021-10-07

## 2021-10-07 RX ORDER — MAGNESIUM SULFATE 1 G/100ML
1000 INJECTION INTRAVENOUS PRN
Status: DISCONTINUED | OUTPATIENT
Start: 2021-10-07 | End: 2021-10-10 | Stop reason: HOSPADM

## 2021-10-07 RX ORDER — ONDANSETRON 2 MG/ML
4 INJECTION INTRAMUSCULAR; INTRAVENOUS ONCE
Status: COMPLETED | OUTPATIENT
Start: 2021-10-07 | End: 2021-10-07

## 2021-10-07 RX ORDER — SODIUM CHLORIDE 0.9 % (FLUSH) 0.9 %
5-40 SYRINGE (ML) INJECTION EVERY 12 HOURS SCHEDULED
Status: DISCONTINUED | OUTPATIENT
Start: 2021-10-07 | End: 2021-10-10 | Stop reason: HOSPADM

## 2021-10-07 RX ORDER — ONDANSETRON 2 MG/ML
4 INJECTION INTRAMUSCULAR; INTRAVENOUS EVERY 6 HOURS PRN
Status: DISCONTINUED | OUTPATIENT
Start: 2021-10-07 | End: 2021-10-07

## 2021-10-07 RX ORDER — ONDANSETRON 2 MG/ML
4 INJECTION INTRAMUSCULAR; INTRAVENOUS EVERY 6 HOURS PRN
Status: DISCONTINUED | OUTPATIENT
Start: 2021-10-07 | End: 2021-10-10 | Stop reason: HOSPADM

## 2021-10-07 RX ORDER — PROMETHAZINE HYDROCHLORIDE 25 MG/ML
12.5 INJECTION, SOLUTION INTRAMUSCULAR; INTRAVENOUS EVERY 6 HOURS PRN
Status: DISCONTINUED | OUTPATIENT
Start: 2021-10-07 | End: 2021-10-10 | Stop reason: HOSPADM

## 2021-10-07 RX ORDER — SODIUM CHLORIDE 0.9 % (FLUSH) 0.9 %
10 SYRINGE (ML) INJECTION PRN
Status: DISCONTINUED | OUTPATIENT
Start: 2021-10-07 | End: 2021-10-10 | Stop reason: HOSPADM

## 2021-10-07 RX ORDER — POLYETHYLENE GLYCOL 3350 17 G/17G
17 POWDER, FOR SOLUTION ORAL DAILY PRN
Status: DISCONTINUED | OUTPATIENT
Start: 2021-10-07 | End: 2021-10-10 | Stop reason: HOSPADM

## 2021-10-07 RX ORDER — PROMETHAZINE HYDROCHLORIDE 25 MG/ML
25 INJECTION, SOLUTION INTRAMUSCULAR; INTRAVENOUS ONCE
Status: COMPLETED | OUTPATIENT
Start: 2021-10-07 | End: 2021-10-07

## 2021-10-07 RX ORDER — SODIUM CHLORIDE 9 MG/ML
25 INJECTION, SOLUTION INTRAVENOUS PRN
Status: DISCONTINUED | OUTPATIENT
Start: 2021-10-07 | End: 2021-10-10 | Stop reason: HOSPADM

## 2021-10-07 RX ORDER — ACETAMINOPHEN 650 MG/1
650 SUPPOSITORY RECTAL EVERY 6 HOURS PRN
Status: DISCONTINUED | OUTPATIENT
Start: 2021-10-07 | End: 2021-10-10 | Stop reason: HOSPADM

## 2021-10-07 RX ORDER — LEVETIRACETAM 750 MG/1
750 TABLET ORAL 2 TIMES DAILY
Status: DISCONTINUED | OUTPATIENT
Start: 2021-10-07 | End: 2021-10-10 | Stop reason: HOSPADM

## 2021-10-07 RX ORDER — SODIUM CHLORIDE 9 MG/ML
INJECTION, SOLUTION INTRAVENOUS CONTINUOUS
Status: DISCONTINUED | OUTPATIENT
Start: 2021-10-07 | End: 2021-10-10 | Stop reason: HOSPADM

## 2021-10-07 RX ORDER — ACETAMINOPHEN 325 MG/1
650 TABLET ORAL EVERY 6 HOURS PRN
Status: DISCONTINUED | OUTPATIENT
Start: 2021-10-07 | End: 2021-10-10 | Stop reason: HOSPADM

## 2021-10-07 RX ORDER — POTASSIUM CHLORIDE 7.45 MG/ML
10 INJECTION INTRAVENOUS PRN
Status: DISCONTINUED | OUTPATIENT
Start: 2021-10-07 | End: 2021-10-10 | Stop reason: HOSPADM

## 2021-10-07 RX ORDER — ALBUTEROL SULFATE 90 UG/1
AEROSOL, METERED RESPIRATORY (INHALATION)
Qty: 8.5 G | Refills: 4 | Status: ON HOLD
Start: 2021-10-07 | End: 2021-10-09 | Stop reason: HOSPADM

## 2021-10-07 RX ORDER — POTASSIUM CHLORIDE 20 MEQ/1
40 TABLET, EXTENDED RELEASE ORAL PRN
Status: DISCONTINUED | OUTPATIENT
Start: 2021-10-07 | End: 2021-10-10 | Stop reason: HOSPADM

## 2021-10-07 RX ORDER — 0.9 % SODIUM CHLORIDE 0.9 %
1000 INTRAVENOUS SOLUTION INTRAVENOUS ONCE
Status: COMPLETED | OUTPATIENT
Start: 2021-10-07 | End: 2021-10-07

## 2021-10-07 RX ADMIN — LEVETIRACETAM 750 MG: 750 TABLET ORAL at 20:41

## 2021-10-07 RX ADMIN — ONDANSETRON 4 MG: 2 INJECTION INTRAMUSCULAR; INTRAVENOUS at 20:21

## 2021-10-07 RX ADMIN — ENOXAPARIN SODIUM 40 MG: 40 INJECTION SUBCUTANEOUS at 20:18

## 2021-10-07 RX ADMIN — ONDANSETRON 4 MG: 2 INJECTION INTRAMUSCULAR; INTRAVENOUS at 15:41

## 2021-10-07 RX ADMIN — SODIUM CHLORIDE: 9 INJECTION, SOLUTION INTRAVENOUS at 20:18

## 2021-10-07 RX ADMIN — PROMETHAZINE HYDROCHLORIDE 25 MG: 25 INJECTION INTRAMUSCULAR; INTRAVENOUS at 18:00

## 2021-10-07 RX ADMIN — SODIUM CHLORIDE 200 ML/HR: 9 INJECTION, SOLUTION INTRAVENOUS at 18:00

## 2021-10-07 RX ADMIN — POTASSIUM CHLORIDE 40 MEQ: 20 TABLET, EXTENDED RELEASE ORAL at 20:40

## 2021-10-07 RX ADMIN — ONDANSETRON 4 MG: 2 INJECTION INTRAMUSCULAR; INTRAVENOUS at 16:50

## 2021-10-07 RX ADMIN — SODIUM CHLORIDE 1000 ML: 9 INJECTION, SOLUTION INTRAVENOUS at 15:46

## 2021-10-07 ASSESSMENT — PAIN DESCRIPTION - PAIN TYPE
TYPE: ACUTE PAIN
TYPE: ACUTE PAIN

## 2021-10-07 ASSESSMENT — PAIN SCALES - GENERAL
PAINLEVEL_OUTOF10: 10
PAINLEVEL_OUTOF10: 5

## 2021-10-07 ASSESSMENT — ENCOUNTER SYMPTOMS
RHINORRHEA: 0
ABDOMINAL DISTENTION: 0
ABDOMINAL PAIN: 1
CHEST TIGHTNESS: 0
COUGH: 0
BLOOD IN STOOL: 0
NAUSEA: 1
COLOR CHANGE: 0
CONSTIPATION: 0
FACIAL SWELLING: 0
EYE DISCHARGE: 0
SHORTNESS OF BREATH: 0
WHEEZING: 0
DIARRHEA: 0
VOMITING: 1
EYE REDNESS: 0

## 2021-10-07 ASSESSMENT — PAIN DESCRIPTION - DESCRIPTORS: DESCRIPTORS: STABBING

## 2021-10-07 ASSESSMENT — PAIN DESCRIPTION - LOCATION
LOCATION: ABDOMEN
LOCATION: ABDOMEN

## 2021-10-07 NOTE — H&P
[facility]  Office: [address], DO, Gera Naiduroe, DO, Evadejuan Dry, DO, Adan Nurys Blood, DO, Asiya Prater MD, Audie Nicholas MD, Kelsey Dueñas MD, Esperanza Okeefe MD, Faby Villanueva MD, Ramy Clifford MD, Genet Thompson MD, Derick Ndiaye, DO, Rnadi Rosa, DO, Kayden Velasco MD,  Susannah Diaz, DO, Serene Dalton MD, Eliane Johnson MD, Chyna Albright MD, Alfred Wallace MD, Janes Ivan MD, Liset Woodard MD, Renato Bull, [facility], [facility], CNP, Houston Brooklyn, CNP, Mariel Sapp, CNS, Teja Fisher, CNP, David Lopez, CNP, Eric Titus, CNP, Denita Still, CNP, Ralph Philippe, CNP, Bert Bernal PA-C, Mindy العراقي, [facility], Latoya Lipoma, CNP, Wallace Dumont, CNP, Phyllistzee August, CNP, Nadiya Nixon, CNP, Abdi Mullins, CNP, Maria Eugenia Ratliff, CNP, [facility]      HISTORY AND PHYSICAL EXAMINATION            Date:   10/7/2021  Patient name:  Luis Goss  Date of admission:  10/7/2021  2:19 PM  MRN:   [id number]  Account:  [de-identified]  YOB: 1980  PCP:    Williemae Cheadle, MD  Room:   2003/2003-02  Code Status:    Full Code    Chief Complaint:     Chief Complaint   Patient presents with    Emesis     onset this morning. clear emesis in basin. History Obtained From:     patient, electronic medical record    History of Present Illness: The patient is a 46 y.o. Non- / non  female who presents with Emesis (onset this morning. clear emesis in basin. )   and she is admitted to the hospital for the management of  Intractable nausea and vomiting. Patient came to emergency room with intractable nausea and vomiting for last few days. Patient reported that she was unable to keep anything down. She denies any blood in vomitus. Patient also reported abdominal pain. She reports history of chronic abdominal pain for last 18 years. She has been diagnosed with irritable bowel syndrome and is on Bentyl.   Patient also is current smoker of marijuana which she uses intermittently. He reported last use was 10 days before. Patient does not report that she had previous injury. She has history of moderate persistent asthma. Evaluation emergency room showed hypokalemia 3.4 otherwise normal kidney function, WBC. Pregnancy test was negative. Past Medical History:     Past Medical History:   Diagnosis Date    GERD (gastroesophageal reflux disease)     Irritable bowel syndrome without diarrhea 2016    Moderate persistent asthma without complication     Moderate persistent asthma without complication     Seizures ([facility] [address]     2013, 14        Past Surgical History:     Past Surgical History:   Procedure Laterality Date     SECTION          TUBAL LIGATION              Medications Prior to Admission:     Prior to Admission medications    Medication Sig Start Date End Date Taking?  Authorizing Provider   albuterol sulfate  (90 Base) MCG/ACT inhaler INHALE TWO PUFFS BY MOUTH EVERY 6 HOURS AS NEEDED FOR WHEEZING 10/7/21   Alys Dakins, MD   ondansetron (ZOFRAN ODT) 4 MG disintegrating tablet Take 1 tablet by mouth every 8 hours as needed for Nausea 21   Gigi Frankel, MD   dicyclomine (BENTYL) 10 MG capsule Take 1 capsule by mouth every 6 hours as needed (cramps) 21   Gigi Frankel, MD   potassium chloride (KLOR-CON M) 20 MEQ extended release tablet Take 1 tablet by mouth daily for 5 days 21  Gigi Frankel, MD   levETIRAcetam (KEPPRA) 750 MG tablet TAKE ONE TABLET BY MOUTH TWICE A DAY 21   Alys Dakins, MD   famotidine (PEPCID) 20 MG tablet TAKE ONE TABLET BY MOUTH TWICE A DAY 21   Alys Dakins, MD   vitamin D3 (CHOLECALCIFEROL) 25 MCG (1000 UT) TABS tablet ONE BY MOUTH DAILY 21   Alys Dakins, MD   ondansetron (ZOFRAN ODT) 4 MG disintegrating tablet Take 1 tablet by mouth every 8 hours as needed for Nausea 3/16/21   Torie De La Fuente Wt 108 lb (49 kg)   LMP 2021   SpO2 99%   BMI 19.75 kg/m²   Temp (24hrs), Av.8 °F (36.6 °C), Min:97.8 °F (36.6 °C), Max:97.8 °F (36.6 °C)    No results for input(s): POCGLU in the last 72 hours. No intake or output data in the 24 hours ending 10/07/21 1829  Physical Exam  Vitals and nursing note reviewed. Constitutional:       General: She is not in acute distress. Appearance: She is not diaphoretic. Comments: In discomfort due to persistent nausea   HENT:      Head: Normocephalic and atraumatic. Nose:      Right Sinus: No maxillary sinus tenderness or frontal sinus tenderness. Left Sinus: No maxillary sinus tenderness or frontal sinus tenderness. Mouth/Throat:      Pharynx: No oropharyngeal exudate. Eyes:      General: No scleral icterus. Conjunctiva/sclera: Conjunctivae normal.      Pupils: Pupils are equal, round, and reactive to light. Neck:      Thyroid: No thyromegaly. Vascular: No JVD. Cardiovascular:      Rate and Rhythm: Normal rate and regular rhythm. Pulses:           Dorsalis pedis pulses are 2+ on the right side and 2+ on the left side. Heart sounds: Normal heart sounds. No murmur heard. Pulmonary:      Effort: Pulmonary effort is normal.      Breath sounds: Normal breath sounds. No wheezing or rales. Abdominal:      Palpations: Abdomen is soft. There is no mass. Tenderness: There is no abdominal tenderness. Musculoskeletal:      Cervical back: Full passive range of motion without pain and neck supple. Lymphadenopathy:      Head:      Right side of head: No submandibular adenopathy. Left side of head: No submandibular adenopathy. Cervical: No cervical adenopathy. Skin:     General: Skin is warm. Neurological:      Mental Status: She is alert and oriented to person, place, and time. Motor: No tremor. Psychiatric:         Behavior: Behavior is cooperative.          Investigations:      Laboratory Testing:  Recent Results (from the past 24 hour(s))   CBC Auto Differential    Collection Time: 10/07/21  3:45 PM   Result Value Ref Range    WBC 6.1 3.5 - 11.3 k/uL    RBC 4.40 3.95 - 5.11 m/uL    Hemoglobin 13.4 11.9 - 15.1 g/dL    Hematocrit 37.7 36.3 - 47.1 %    MCV 85.7 82.6 - 102.9 fL    MCH 30.5 25.2 - 33.5 pg    MCHC 35.5 (H) 28.4 - 34.8 g/dL    RDW 11.9 11.8 - 14.4 %    Platelets 776 887 - 270 k/uL    MPV 10.0 8.1 - 13.5 fL    NRBC Automated NOT REPORTED 0.0 per 100 WBC    Differential Type NOT REPORTED     WBC Morphology NOT REPORTED     RBC Morphology NOT REPORTED     Platelet Estimate NOT REPORTED     Seg Neutrophils 68 (H) 36 - 65 %    Lymphocytes 26 24 - 43 %    Monocytes 4 3 - 12 %    Eosinophils % 1 1 - 4 %    Basophils 1 0 - 2 %    Immature Granulocytes 0 0 %    Segs Absolute 4.08 1.50 - 8.10 k/uL    Absolute Lymph # 1.60 1.10 - 3.70 k/uL    Absolute Mono # 0.23 0.10 - 1.20 k/uL    Absolute Eos # 0.08 0.00 - 0.44 k/uL    Basophils Absolute 0.04 0.00 - 0.20 k/uL    Absolute Immature Granulocyte 0.02 0.00 - 0.30 k/uL   Basic Metabolic Panel    Collection Time: 10/07/21  3:45 PM   Result Value Ref Range    Glucose 126 (H) 70 - 99 mg/dL    BUN 8 6 - 20 mg/dL    CREATININE 0.70 0.50 - 0.90 mg/dL    Bun/Cre Ratio 11 9 - 20    Calcium 9.2 8.6 - 10.4 mg/dL    Sodium 136 135 - 144 mmol/L    Potassium 3.4 (L) 3.7 - 5.3 mmol/L    Chloride 99 98 - 107 mmol/L    CO2 24 20 - 31 mmol/L    Anion Gap 13 9 - 17 mmol/L    GFR Non-African American >60 >60 mL/min    GFR African American >60 >60 mL/min    GFR Comment          GFR Staging NOT REPORTED    HCG Qualitative, Serum    Collection Time: 10/07/21  3:45 PM   Result Value Ref Range    hCG Qual NEGATIVE NEGATIVE       Imaging/Diagonstics:    No results found.      Assessment :      Primary Problem  Intractable nausea and vomiting    Active Hospital Problems    Diagnosis Date Noted    Intractable nausea and vomiting [R11.2] 10/07/2021    Marijuana use [F12.90]

## 2021-10-07 NOTE — ED PROVIDER NOTES
[facility] ED  EMERGENCY DEPARTMENT ENCOUNTER      Pt Name: Ayla Adrian  MRN: [id number]  Braydengfkatarzyna 1980  Date of evaluation: 10/7/2021  Provider: Baljeet Castellon MD    CHIEF COMPLAINT       Chief Complaint   Patient presents with    Emesis     onset this morning. clear emesis in basin. HISTORY OF PRESENT ILLNESS  (Location/Symptom, Timing/Onset, Context/Setting, Quality, Duration, Modifying Factors, Severity.)   Scarlet King is a [address] [facility] y.o. female who presents to the emergency department for nausea and vomiting. It started this morning. No diarrhea fever or hematemesis. She states that this happens a lot and she saw a stomach specialist and they told her she has heartburn. She states she has not had an endoscopy. She rated the pain as a 10 and its continuous. Nursing Notes were reviewed. ALLERGIES     Omeprazole    CURRENT MEDICATIONS       Previous Medications    DICYCLOMINE (BENTYL) 10 MG CAPSULE    Take 1 capsule by mouth every 6 hours as needed (cramps)    DICYCLOMINE (BENTYL) 20 MG TABLET    Take 1 tablet by mouth 4 times daily for 10 days    FAMOTIDINE (PEPCID) 20 MG TABLET    TAKE ONE TABLET BY MOUTH TWICE A DAY    LEVETIRACETAM (KEPPRA) 750 MG TABLET    TAKE ONE TABLET BY MOUTH TWICE A DAY    METOCLOPRAMIDE (REGLAN) 10 MG TABLET    Take 1 tablet by mouth 3 times daily WARNING:  May cause drowsiness. May impair ability to operate vehicles or machinery. Do not use in combination with alcohol.     ONDANSETRON (ZOFRAN ODT) 4 MG DISINTEGRATING TABLET    Take 1 tablet by mouth every 8 hours as needed for Nausea    ONDANSETRON (ZOFRAN ODT) 4 MG DISINTEGRATING TABLET    Take 1 tablet by mouth every 8 hours as needed for Nausea    POTASSIUM CHLORIDE (KLOR-CON M) 20 MEQ EXTENDED RELEASE TABLET    Take 1 tablet by mouth daily for 5 days    VITAMIN D3 (CHOLECALCIFEROL) 25 MCG (1000 UT) TABS TABLET    ONE BY MOUTH DAILY       PAST MEDICAL HISTORY         Diagnosis Date    GERD (gastroesophageal reflux disease)     Irritable bowel syndrome without diarrhea 2016    Seizures ([address]     2013, 14       SURGICAL HISTORY           Procedure Laterality Date     SECTION          TUBAL LIGATION               FAMILY HISTORY           Problem Relation Age of Onset    High Cholesterol Mother     Birth Defects Neg Hx     Diabetes Neg Hx      Family Status   Relation Name Status    Mother  Alive    Father  Alive    Neg Hx  (Not Specified)        SOCIAL HISTORY      reports that she has been smoking cigarettes. She has a 10.00 pack-year smoking history. She has never used smokeless tobacco. She reports current drug use. Drug: Marijuana. She reports that she does not drink alcohol. REVIEW OF SYSTEMS    (2-9 systems for level 4, 10 or more for level 5)     Review of Systems   Constitutional: Negative for chills, fatigue and fever. HENT: Negative for congestion, ear discharge and facial swelling. Eyes: Negative for discharge and redness. Respiratory: Negative for cough and shortness of breath. Cardiovascular: Negative for chest pain. Gastrointestinal: Positive for abdominal pain, nausea and vomiting. Negative for constipation and diarrhea. Genitourinary: Negative for dysuria and hematuria. Musculoskeletal: Negative for arthralgias. Skin: Negative for color change and rash. Neurological: Negative for syncope, numbness and headaches. Hematological: Negative for adenopathy. Psychiatric/Behavioral: Negative for confusion. The patient is not nervous/anxious. Except as noted above the remainder of the review of systems was reviewed and negative.      PHYSICAL EXAM    (up to 7 for level 4, 8 or more for level 5)     Vitals:    10/07/21 1408 10/07/21 1410   BP: (!) 139/102    Pulse: 78    Resp: 18    Temp:  97.8 °F (36.6 °C)   TempSrc: Oral Oral   SpO2: 99%    Weight: 108 lb (49 kg)    Height: 5' 2\" (1.575 m)        Physical Exam  Vitals reviewed. Constitutional:       General: She is not in acute distress. Appearance: She is well-developed. She is not diaphoretic. HENT:      Head: Normocephalic and atraumatic. Eyes:      General: No scleral icterus. Right eye: No discharge. Left eye: No discharge. Cardiovascular:      Rate and Rhythm: Normal rate and regular rhythm. Pulmonary:      Effort: Pulmonary effort is normal. No respiratory distress. Breath sounds: Normal breath sounds. No stridor. No wheezing or rales. Abdominal:      General: There is no distension. Palpations: Abdomen is soft. Tenderness: There is no abdominal tenderness. Musculoskeletal:         General: Normal range of motion. Cervical back: Neck supple. Lymphadenopathy:      Cervical: No cervical adenopathy. Skin:     General: Skin is warm and dry. Findings: No erythema or rash. Neurological:      Mental Status: She is alert and oriented to person, place, and time.    Psychiatric:         Behavior: Behavior normal.             DIAGNOSTIC RESULTS     EKG: All EKG's are interpreted by the Emergency Department Physician who either signs or Co-signs this chart in the absence of a cardiologist.    RADIOLOGY:   Non-plain film images such as CT, Ultrasound and MRI are read by the radiologist. Plain radiographic images are visualized and preliminarily interpreted by the emergency physician with the below findings:    Interpretation per the Radiologist below, if available at the time of this note:        LABS:  Labs Reviewed   CBC WITH AUTO DIFFERENTIAL - Abnormal; Notable for the following components:       Result Value    MCHC 35.5 (*)     Seg Neutrophils 68 (*)     All other components within normal limits   BASIC METABOLIC PANEL - Abnormal; Notable for the following components:    Glucose 126 (*)     Potassium 3.4 (*)     All other components within normal limits   HCG, SERUM, QUALITATIVE       All other labs were within normal range or not returned as of this dictation. EMERGENCY DEPARTMENT COURSE and DIFFERENTIAL DIAGNOSIS/MDM:   Vitals:    Vitals:    10/07/21 1408 10/07/21 1410   BP: (!) 139/102    Pulse: 78    Resp: 18    Temp:  97.8 °F (36.6 °C)   TempSrc: Oral Oral   SpO2: 99%    Weight: 108 lb (49 kg)    Height: 5' 2\" (1.575 m)        Orders Placed This Encounter   Medications    0.9 % sodium chloride bolus    ondansetron (ZOFRAN) injection 4 mg    ondansetron (ZOFRAN) injection 4 mg    promethazine (PHENERGAN) injection 25 mg    0.9 % sodium chloride infusion       Medical Decision Making: Patient's labs are appropriate. She continues to have emesis despite receiving IV fluids and multiple antiemetics. She is being admitted. Treatment diagnosis and disposition were discussed with the patient. CONSULTS:  IP CONSULT TO HOSPITALIST    PROCEDURES:  None    FINAL IMPRESSION      1. Intractable vomiting with nausea, unspecified vomiting type          DISPOSITION/PLAN   DISPOSITION Decision To Admit 10/07/2021 05:42:33 PM      PATIENT REFERRED TO:   No follow-up provider specified.     DISCHARGE MEDICATIONS:     New Prescriptions    No medications on file         (Please note that portions of this note were completed with a voice recognition program.  Efforts were made to edit the dictations but occasionally words are mis-transcribed.)    Betty Eden MD  Attending Emergency Physician           Betty Eden MD  10/07/21 [id number]

## 2021-10-08 LAB
ANION GAP SERPL CALCULATED.3IONS-SCNC: 14 MMOL/L (ref 9–17)
BUN BLDV-MCNC: 10 MG/DL (ref 6–20)
BUN/CREAT BLD: 15 (ref 9–20)
CALCIUM SERPL-MCNC: 8.7 MG/DL (ref 8.6–10.4)
CHLORIDE BLD-SCNC: 105 MMOL/L (ref 98–107)
CO2: 23 MMOL/L (ref 20–31)
CREAT SERPL-MCNC: 0.68 MG/DL (ref 0.5–0.9)
GFR AFRICAN AMERICAN: >60 ML/MIN
GFR NON-AFRICAN AMERICAN: >60 ML/MIN
GFR SERPL CREATININE-BSD FRML MDRD: ABNORMAL ML/MIN/{1.73_M2}
GFR SERPL CREATININE-BSD FRML MDRD: ABNORMAL ML/MIN/{1.73_M2}
GLUCOSE BLD-MCNC: 108 MG/DL (ref 70–99)
HCT VFR BLD CALC: 34.3 % (ref 36.3–47.1)
HEMOGLOBIN: 12.3 G/DL (ref 11.9–15.1)
INR BLD: 1.1
MAGNESIUM: 1.9 MG/DL (ref 1.6–2.6)
MCH RBC QN AUTO: 30.2 PG (ref 25.2–33.5)
MCHC RBC AUTO-ENTMCNC: 35.9 G/DL (ref 28.4–34.8)
MCV RBC AUTO: 84.3 FL (ref 82.6–102.9)
NRBC AUTOMATED: 0 PER 100 WBC
PDW BLD-RTO: 11.9 % (ref 11.8–14.4)
PLATELET # BLD: 257 K/UL (ref 138–453)
PMV BLD AUTO: 10.2 FL (ref 8.1–13.5)
POTASSIUM SERPL-SCNC: 3.4 MMOL/L (ref 3.7–5.3)
PROTHROMBIN TIME: 14 SEC (ref 11.5–14.2)
RBC # BLD: 4.07 M/UL (ref 3.95–5.11)
SODIUM BLD-SCNC: 142 MMOL/L (ref 135–144)
WBC # BLD: 8.6 K/UL (ref 3.5–11.3)

## 2021-10-08 PROCEDURE — C9113 INJ PANTOPRAZOLE SODIUM, VIA: HCPCS | Performed by: FAMILY MEDICINE

## 2021-10-08 PROCEDURE — G0378 HOSPITAL OBSERVATION PER HR: HCPCS

## 2021-10-08 PROCEDURE — 6360000002 HC RX W HCPCS: Performed by: FAMILY MEDICINE

## 2021-10-08 PROCEDURE — 36415 COLL VENOUS BLD VENIPUNCTURE: CPT

## 2021-10-08 PROCEDURE — 83735 ASSAY OF MAGNESIUM: CPT

## 2021-10-08 PROCEDURE — 85027 COMPLETE CBC AUTOMATED: CPT

## 2021-10-08 PROCEDURE — 80048 BASIC METABOLIC PNL TOTAL CA: CPT

## 2021-10-08 PROCEDURE — 99232 SBSQ HOSP IP/OBS MODERATE 35: CPT | Performed by: FAMILY MEDICINE

## 2021-10-08 PROCEDURE — 6370000000 HC RX 637 (ALT 250 FOR IP): Performed by: FAMILY MEDICINE

## 2021-10-08 PROCEDURE — 85610 PROTHROMBIN TIME: CPT

## 2021-10-08 PROCEDURE — 1200000000 HC SEMI PRIVATE

## 2021-10-08 PROCEDURE — 99223 1ST HOSP IP/OBS HIGH 75: CPT | Performed by: INTERNAL MEDICINE

## 2021-10-08 PROCEDURE — 2580000003 HC RX 258: Performed by: FAMILY MEDICINE

## 2021-10-08 RX ORDER — PANTOPRAZOLE SODIUM 40 MG/10ML
40 INJECTION, POWDER, LYOPHILIZED, FOR SOLUTION INTRAVENOUS 2 TIMES DAILY
Status: DISCONTINUED | OUTPATIENT
Start: 2021-10-08 | End: 2021-10-08

## 2021-10-08 RX ORDER — SODIUM CHLORIDE 9 MG/ML
10 INJECTION INTRAVENOUS 2 TIMES DAILY
Status: DISCONTINUED | OUTPATIENT
Start: 2021-10-08 | End: 2021-10-08

## 2021-10-08 RX ORDER — PANTOPRAZOLE SODIUM 40 MG/1
40 TABLET, DELAYED RELEASE ORAL
Status: DISCONTINUED | OUTPATIENT
Start: 2021-10-09 | End: 2021-10-09

## 2021-10-08 RX ORDER — ALBUTEROL SULFATE 90 UG/1
2 AEROSOL, METERED RESPIRATORY (INHALATION) EVERY 4 HOURS PRN
Status: DISCONTINUED | OUTPATIENT
Start: 2021-10-08 | End: 2021-10-10 | Stop reason: HOSPADM

## 2021-10-08 RX ADMIN — ONDANSETRON 4 MG: 2 INJECTION INTRAMUSCULAR; INTRAVENOUS at 08:27

## 2021-10-08 RX ADMIN — POTASSIUM CHLORIDE 40 MEQ: 20 TABLET, EXTENDED RELEASE ORAL at 09:04

## 2021-10-08 RX ADMIN — ONDANSETRON 4 MG: 2 INJECTION INTRAMUSCULAR; INTRAVENOUS at 19:50

## 2021-10-08 RX ADMIN — LEVETIRACETAM 750 MG: 750 TABLET ORAL at 19:51

## 2021-10-08 RX ADMIN — PROMETHAZINE HYDROCHLORIDE 12.5 MG: 25 INJECTION INTRAMUSCULAR; INTRAVENOUS at 23:42

## 2021-10-08 RX ADMIN — SODIUM CHLORIDE, PRESERVATIVE FREE 10 ML: 5 INJECTION INTRAVENOUS at 21:06

## 2021-10-08 RX ADMIN — PROMETHAZINE HYDROCHLORIDE 12.5 MG: 25 INJECTION INTRAMUSCULAR; INTRAVENOUS at 14:44

## 2021-10-08 RX ADMIN — PROMETHAZINE HYDROCHLORIDE 12.5 MG: 25 INJECTION INTRAMUSCULAR; INTRAVENOUS at 05:36

## 2021-10-08 RX ADMIN — ENOXAPARIN SODIUM 40 MG: 40 INJECTION SUBCUTANEOUS at 09:09

## 2021-10-08 RX ADMIN — PANTOPRAZOLE SODIUM 40 MG: 40 INJECTION, POWDER, FOR SOLUTION INTRAVENOUS at 12:23

## 2021-10-08 RX ADMIN — ACETAMINOPHEN 650 MG: 325 TABLET ORAL at 09:09

## 2021-10-08 RX ADMIN — SODIUM CHLORIDE: 9 INJECTION, SOLUTION INTRAVENOUS at 04:08

## 2021-10-08 RX ADMIN — SODIUM CHLORIDE: 9 INJECTION, SOLUTION INTRAVENOUS at 16:59

## 2021-10-08 RX ADMIN — SODIUM CHLORIDE, PRESERVATIVE FREE 10 ML: 5 INJECTION INTRAVENOUS at 12:23

## 2021-10-08 RX ADMIN — LEVETIRACETAM 750 MG: 750 TABLET ORAL at 09:04

## 2021-10-08 RX ADMIN — PANTOPRAZOLE SODIUM 40 MG: 40 INJECTION, POWDER, FOR SOLUTION INTRAVENOUS at 19:51

## 2021-10-08 ASSESSMENT — ENCOUNTER SYMPTOMS
DIARRHEA: 1
BLOOD IN STOOL: 0
NAUSEA: 1
CHEST TIGHTNESS: 0
BACK PAIN: 0
RHINORRHEA: 0
VOICE CHANGE: 0
WHEEZING: 0
ABDOMINAL PAIN: 0
EYES NEGATIVE: 1
TROUBLE SWALLOWING: 0
DIARRHEA: 0
ABDOMINAL PAIN: 1
SHORTNESS OF BREATH: 0
VOMITING: 1
COUGH: 0
SORE THROAT: 0
COLOR CHANGE: 0
CONSTIPATION: 0
CHOKING: 0

## 2021-10-08 ASSESSMENT — PAIN SCALES - GENERAL
PAINLEVEL_OUTOF10: 1
PAINLEVEL_OUTOF10: 5

## 2021-10-08 NOTE — ACP (ADVANCE CARE PLANNING)
Advance Care Planning     Advance Care Planning Activator (Inpatient)  Conversation Note      Date of ACP Conversation: 10/8/2021     Conversation Conducted with: Patient with Decision Making Capacity    ACP Activator: Thang Cline RN    {When Decision Maker makes decisions on behalf of the incapacitated patient: Decision Maker is asked to consider and make decisions based on patient values, known preferences, or best interests. Health Care Decision Maker:     Current Designated Health Care Decision Maker:   Cara LawindianaYolande   Phone: [phone]    Click here to complete Healthcare Decision Makers including section of the Healthcare Decision Maker Relationship (ie \"Primary\")      Care Preferences    Ventilation: \"If you were in your present state of health and suddenly became very ill and were unable to breathe on your own, what would your preference be about the use of a ventilator (breathing machine) if it were available to you? \"      Would the patient desire the use of ventilator (breathing machine)?: yes    \"If your health worsens and it becomes clear that your chance of recovery is unlikely, what would your preference be about the use of a ventilator (breathing machine) if it were available to you? \"     Would the patient desire the use of ventilator (breathing machine)?: Yes      Resuscitation  \"CPR works best to restart the heart when there is a sudden event, like a heart attack, in someone who is otherwise healthy. Unfortunately, CPR does not typically restart the heart for people who have serious health conditions or who are very sick. \"    \"In the event your heart stopped as a result of an underlying serious health condition, would you want attempts to be made to restart your heart (answer \"yes\" for attempt to resuscitate) or would you prefer a natural death (answer \"no\" for do not attempt to resuscitate)? \" yes       [] Yes   [x] No   Educated Patient / Decision Maker regarding differences between Advance Directives and portable DNR orders.     Length of ACP Conversation in minutes:  5    Conversation Outcomes:  [x] ACP discussion completed  [] Existing advance directive reviewed with patient; no changes to patient's previously recorded wishes  [] New Advance Directive completed  [] Portable Do Not Rescitate prepared for Provider review and signature  [] POLST/POST/MOLST/MOST prepared for Provider review and signature      Follow-up plan:    [] Schedule follow-up conversation to continue planning  [] Referred individual to Provider for additional questions/concerns   [] Advised patient/agent/surrogate to review completed ACP document and update if needed with changes in condition, patient preferences or care setting    [] This note routed to one or more involved healthcare providers

## 2021-10-08 NOTE — CARE COORDINATION
Case Management Initial Discharge Plan  Radha Wisdom,             Met with:patient to discuss discharge plans. Information verified: address, contacts, phone number, , insurance Yes  PCP: Libby Yung MD  Date of last visit: 2020    Insurance Provider: Grace Nettles Dual    Discharge Planning    Living Arrangements:  Children, Family Members   Support Systems:  Parent, Family Members    Home has 1st story apt  4 stairs to climb to get into front door, 4 stairs to climb to reach second floor  Location of bedroom/bathroom in home  - main floor of apt    Patient able to perform ADL's:Independent    Current Services (outpatient & in home) none  DME equipment: none  DME provider: N/A    Pharmacy: [facility] [place]    Potential Assistance Needed:  N/A    Patient agreeable to home care: No  [place] of choice provided:  n/a    Prior SNF/Rehab Placement and Facility: No  Agreeable to SNF/Rehab: No  [place] of choice provided: n/a   Evaluation: n/a    Expected Discharge date:  10/08/21  Patient expects to be discharged to: Follow Up Appointment: Best Day/ Time:     Transportation provider: sister in law  Transportation arrangements needed for discharge: No    Readmission Risk              Risk of Unplanned Readmission:  0             Does patient have a readmission risk score greater than 14?: No  If yes, follow-up appointment must be made within 7 days of discharge. Goal of Care:       Discharge Plan: Met with patient at bedside. Lives with mom and 16 yr old daughter and is independent. Uses [employer] for transportation. Admitted for intractable nausea and vomiting that started yesterday. Pt states is dehydrated. Has history of GERD, IBS, seizures and asthma. Admits to using marijuana but not daily. Continue to follow and no home needs anticipated.         Electronically signed by Elinor Johnson RN on 10/8/21 at 9:14 AM EDT

## 2021-10-08 NOTE — PROGRESS NOTES
Occupational Therapy    DATE: 10/8/2021    NAME: Kolby Amanda  MRN: [id number]   : 1980    Patient not seen this date for Occupational Therapy due to:      [] Cancel by RN or physician due to:    [] Hemodialysis    [] Critical Lab Value Level     [] Blood transfusion in progress    [] Acute or unstable cardiovascular status   _MAP < 55 or more than >115  _HR < 40 or > 130    [] Acute or unstable pulmonary status   -FiO2 > 60%   _RR < 5 or >40    _O2 sats < 85%    [] Strict Bedrest    [] Off Unit for surgery or procedure    [] Off Unit for testing       [] Pending imaging to R/O fracture    [] Refusal by Patient      [] Other      [x] OT being discontinued at this time. Patient independent. No further needs. [] OT being discontinued at this time as the patient has been transferred to hospice care. No further needs.       Aleisha Burgos OTR/L

## 2021-10-08 NOTE — PLAN OF CARE
Problem: Fluid Volume - Imbalance:  Goal: Absence of imbalanced fluid volume signs and symptoms  Description: Absence of imbalanced fluid volume signs and symptoms  Outcome: Ongoing  Note: Monitor intake and output, IV fluids, GI consult

## 2021-10-08 NOTE — PROGRESS NOTES
Pt had one bout of emesis last night around 8pm and not again until this morning, around 530am. It was 150 ml dark brown in color. PRN Zoforan and PRN Phenergan alternated.

## 2021-10-08 NOTE — RT PROTOCOL NOTE
RT Inhaler-Nebulizer Bronchodilator Protocol Note    There is a bronchodilator order in the chart from a provider indicating to follow the RT Bronchodilator Protocol and there is an Initiate RT Inhaler-Nebulizer Bronchodilator Protocol order as well (see protocol at bottom of note). CXR Findings:  No results found. The findings from the last RT Protocol Assessment were as follows:   History Pulmonary Disease: None or smoker <15 pack years  Respiratory Pattern: Regular pattern and RR 12-20 bpm  Breath Sounds: Clear breath sounds  Cough: Strong, productive  Indication for Bronchodilator Therapy: On home bronchodilators  Bronchodilator Assessment Score: 1    Aerosolized bronchodilator medication orders have been revised according to the RT Inhaler-Nebulizer Bronchodilator Protocol below. Respiratory Therapist to perform RT Therapy Protocol Assessment initially then follow the protocol. Repeat RT Therapy Protocol Assessment PRN for score 0-3 or on second treatment, BID, and PRN for scores above 3. No Indications - adjust the frequency to every 6 hours PRN wheezing or bronchospasm, if no treatments needed after 48 hours then discontinue using Per Protocol order mode. If indication present, adjust the RT bronchodilator orders based on the Bronchodilator Assessment Score as indicated below. Use Inhaler orders unless patient has one or more of the following: on home nebulizer, not able to hold breath for 10 seconds, is not alert and oriented, cannot activate and use MDI correctly, or respiratory rate 25 breaths per minute or more, then use the equivalent nebulizer order(s) with same Frequency and PRN reasons based on the score. If a patient is on this medication at home then do not decrease Frequency below that used at home.     0-3 - enter or revise RT bronchodilator order(s) to equivalent RT Bronchodilator order with Frequency of every 4 hours PRN for wheezing or increased work of breathing using Per Protocol order mode. 4-6 - enter or revise RT Bronchodilator order(s) to two equivalent RT bronchodilator orders with one order with BID Frequency and one order with Frequency of every 4 hours PRN wheezing or increased work of breathing using Per Protocol order mode. 7-10 - enter or revise RT Bronchodilator order(s) to two equivalent RT bronchodilator orders with one order with TID Frequency and one order with Frequency of every 4 hours PRN wheezing or increased work of breathing using Per Protocol order mode. 11-13 - enter or revise RT Bronchodilator order(s) to one equivalent RT bronchodilator order with QID Frequency and an Albuterol order with Frequency of every 4 hours PRN wheezing or increased work of breathing using Per Protocol order mode. Greater than 13 - enter or revise RT Bronchodilator order(s) to one equivalent RT bronchodilator order with every 4 hours Frequency and an Albuterol order with Frequency of every 2 hours PRN wheezing or increased work of breathing using Per Protocol order mode. RT to enter RT Home Evaluation for COPD & MDI Assessment order using Per Protocol order mode.     Electronically signed by Shelbie Diaz RCP on 10/8/2021 at 2:25 AM

## 2021-10-08 NOTE — PROGRESS NOTES
[facility]  Office: [address], DO, Che Rangel, DO, Sharon Anamaria, DO, Amanda Chavez Blood, DO, Abimael Payton MD, Shanda Linn MD, Lopez Bradshaw MD, Joe Gr MD, Kvng Lewis MD, Latricia Burt MD, Kelsea Conn MD, Charlene Mcneil, DO, Oswaldo Mai, DO, Kwan Hernandez MD,  Nicole Hahn, DO, Renee Jacobsen MD, Jad Valdez MD, Katarina Chávez MD, Karley Shore MD, Telma Reynoso MD, Evelia Cruz MD, Mony Kamara, [facility], [facility], CNP, Suzanne Nino, CNP, Adele Valdez, CNS, Alyssa Glalardo, CNP, Zari Nicole, CNP, Hailee Burns, CNP, Pilar Junior, CNP, Guille Simmons, CNP, Reji Griggs PA-C, Cleone Denver, [facility], Kareem Gary, CNP, Raegan Almonte, CNP, Meghan Carranza, CNP, [facility], CNP, Manuel Delgado, CNP, Angeles Maki, [facility], [facility]      Daily Progress Note     Admit Date: 10/7/2021  Bed/Room No.  2003/2003-02  Admitting Physician : Lopez Bradshaw MD  Code Status :Full Code  Hospital Day:  LOS: 0 days   Chief Complaint:     Chief Complaint   Patient presents with    Emesis     onset this morning. clear emesis in basin. Principal Problem:    Intractable nausea and vomiting  Active Problems:    GERD (gastroesophageal reflux disease)    Seizure (HCC)    Marijuana use    Moderate persistent asthma without complication  Resolved Problems:    * No resolved hospital problems. *    Subjective : Interval History/Significant events :  10/08/21    Patient continues to complain of abdominal pain with epigastric, retrosternal discomfort. She reports GERD-like symptoms. Patient reports poor appetite and does not want to eat. She had multiple vomiting episodes since morning without any blood in it. Vitals - Stable afebrile  Labs -hypokalemia, hemoglobin stable. Nursing notes , Consults notes reviewed. Overnight events and updates discussed with Nursing staff .    Background History:         Georgia He is 46 y.o. female  Who was admitted to the hospital on 10/7/2021 for treatment of Intractable nausea and vomiting. Patient came to emergency room with intractable nausea and vomiting for last few days. Patient reported that she was unable to keep anything down. She denies any blood in vomitus. Patient also reported abdominal pain. She reports history of chronic abdominal pain for last 18 years. She has been diagnosed with irritable bowel syndrome and is on Bentyl. Patient also is current smoker of marijuana which she uses intermittently. He reported last use was 10 days before. Patient does not report that she had previous injury. She has history of moderate persistent asthma. Evaluation emergency room showed hypokalemia 3.4 otherwise normal kidney function, WBC. Pregnancy test was negative. PMH:  Past Medical History:   Diagnosis Date    GERD (gastroesophageal reflux disease)     Irritable bowel syndrome without diarrhea 09/07/2016    Moderate persistent asthma without complication     Moderate persistent asthma without complication     Seizures ([facility] [address]     12/2013, 4/19/14      Allergies: Allergies   Allergen Reactions    Omeprazole      Pt had a seizure      Medications :  sodium chloride flush, 5-40 mL, IntraVENous, 2 times per day  enoxaparin, 40 mg, SubCUTAneous, Daily  levETIRAcetam, 750 mg, Oral, BID        Review of Systems   Review of Systems   Constitutional: Negative for appetite change, fatigue, fever and unexpected weight change. HENT: Negative for congestion, rhinorrhea and sneezing. Eyes: Negative for visual disturbance. Respiratory: Negative for cough, chest tightness, shortness of breath and wheezing. Cardiovascular: Negative for chest pain and palpitations. Gastrointestinal: Positive for nausea and vomiting. Negative for abdominal pain, blood in stool, constipation and diarrhea. Genitourinary: Negative for dysuria, enuresis, frequency and hematuria. Musculoskeletal: Negative for arthralgias and myalgias. Skin: Negative for rash. Neurological: Negative for dizziness, weakness, light-headedness and headaches. Hematological: Does not bruise/bleed easily. Psychiatric/Behavioral: Negative for dysphoric mood and sleep disturbance. Objective :      Current Vitals : Temp: 98.4 °F (36.9 °C),  Pulse: 74, Resp: 16, BP: (!) 153/70, SpO2: 98 %  Last 24 Hrs Vitals   Patient Vitals for the past 24 hrs:   BP Temp Temp src Pulse Resp SpO2 Height Weight   10/08/21 0722 (!) 153/70 98.4 °F (36.9 °C) Oral 74 16 98 % -- --   10/08/21 0414 -- -- -- -- -- -- -- 117 lb 12.8 oz (53.4 kg)   10/07/21 1919 128/70 97.9 °F (36.6 °C) Oral 101 18 95 % -- --   10/07/21 1832 (!) 143/75 97.7 °F (36.5 °C) Oral (!) 45 16 97 % -- --   10/07/21 1410 -- 97.8 °F (36.6 °C) Oral -- -- -- -- --   10/07/21 1408 (!) 139/102 -- Oral 78 18 99 % 5' 2\" (1.575 m) 108 lb (49 kg)     Intake / output   10/07 0701 - 10/08 0700  In: 878 [I.V.:878]  Out: 1225 [Urine:900]  Physical Exam:  Physical Exam  Vitals and nursing note reviewed. Constitutional:       General: She is not in acute distress. Appearance: She is not diaphoretic. HENT:      Head: Normocephalic and atraumatic. Nose:      Right Sinus: No maxillary sinus tenderness or frontal sinus tenderness. Left Sinus: No maxillary sinus tenderness or frontal sinus tenderness. Mouth/Throat:      Pharynx: No oropharyngeal exudate. Eyes:      General: No scleral icterus. Conjunctiva/sclera: Conjunctivae normal.      Pupils: Pupils are equal, round, and reactive to light. Neck:      Thyroid: No thyromegaly. Vascular: No JVD. Cardiovascular:      Rate and Rhythm: Normal rate and regular rhythm. Pulses:           Dorsalis pedis pulses are 2+ on the right side and 2+ on the left side. Heart sounds: Normal heart sounds. No murmur heard.      Pulmonary:      Effort: Pulmonary effort is normal.      Breath sounds: Normal breath sounds. No wheezing or rales. Abdominal:      Palpations: Abdomen is soft. There is no mass. Tenderness: There is abdominal tenderness in the epigastric area. Musculoskeletal:      Cervical back: Full passive range of motion without pain and neck supple. Lymphadenopathy:      Head:      Right side of head: No submandibular adenopathy. Left side of head: No submandibular adenopathy. Cervical: No cervical adenopathy. Skin:     General: Skin is warm. Neurological:      Mental Status: She is alert and oriented to person, place, and time. Motor: No tremor. Psychiatric:         Behavior: Behavior is cooperative. Laboratory findings:    Recent Labs     10/07/21  1545 10/08/21  0519   WBC 6.1 8.6   HGB 13.4 12.3   HCT 37.7 34.3*    257   INR  --  1.1     Recent Labs     10/07/21  1545 10/08/21  0519    142   K 3.4* 3.4*   CL 99 105   CO2 24 23   GLUCOSE 126* 108*   BUN 8 10   CREATININE 0.70 0.68   MG  --  1.9   CALCIUM 9.2 8.7     Recent Labs     10/07/21  1545   PROT 7.3   LABALBU 4.6   AST 19   ALT 11   ALKPHOS 47   BILITOT 0.30   BILIDIR 0.08   AMYLASE 91   LIPASE 12*          Specific Gravity, UA   Date Value Ref Range Status   09/13/2021 1.025 1.005 - 1.030 Final     Protein, UA   Date Value Ref Range Status   09/13/2021 1+ (A) NEGATIVE Final     RBC, UA   Date Value Ref Range Status   09/13/2021 0 TO 2 0 - 2 /HPF Final     Bacteria, UA   Date Value Ref Range Status   09/13/2021 FEW (A) None Final     Nitrite, Urine   Date Value Ref Range Status   09/13/2021 NEGATIVE NEGATIVE Final     WBC, UA   Date Value Ref Range Status   09/13/2021 10 TO 20 0 - 5 /HPF Final     Leukocyte Esterase, Urine   Date Value Ref Range Status   09/13/2021 NEGATIVE NEGATIVE Final       Imaging / Clinical Data :-   No results found.      Clinical Course : Unchanged    Assessment and Plan  :        Intractable nausea vomiting likely secondary to marijuana use Vs gastritis-add

## 2021-10-08 NOTE — PROGRESS NOTES
Physical Therapy  DATE: 10/8/2021    NAME: Cinthia Valdivia  MRN: [id number]   : 1980    Patient not seen this date for Physical Therapy due to:      [] Cancel by RN or physician due to:    [] Hemodialysis    [] Critical Lab Value Level     [] Blood transfusion in progress    [] Acute or unstable cardiovascular status   _MAP < 55 or more than >115  _HR < 40 or > 130    [] Acute or unstable pulmonary status   -FiO2 > 60%   _RR < 5 or >40    _O2 sats < 85%    [] Strict Bedrest    [] Off Unit for surgery or procedure    [] Off Unit for testing       [] Pending imaging to R/O fracture    [] Refusal by Patient      [] Other      [x] PT being discontinued at this time. Patient independent. No further needs. [] PT being discontinued at this time as the patient has been transferred to hospice care. No further needs.       Suzan Diaz, PT

## 2021-10-09 ENCOUNTER — ANESTHESIA (OUTPATIENT)
Dept: OPERATING ROOM | Age: 41
DRG: 392 | End: 2021-10-09
Payer: COMMERCIAL

## 2021-10-09 ENCOUNTER — ANESTHESIA EVENT (OUTPATIENT)
Dept: OPERATING ROOM | Age: 41
DRG: 392 | End: 2021-10-09
Payer: COMMERCIAL

## 2021-10-09 ENCOUNTER — APPOINTMENT (OUTPATIENT)
Dept: ULTRASOUND IMAGING | Age: 41
DRG: 392 | End: 2021-10-09
Payer: COMMERCIAL

## 2021-10-09 VITALS
DIASTOLIC BLOOD PRESSURE: 80 MMHG | OXYGEN SATURATION: 100 % | RESPIRATION RATE: 25 BRPM | SYSTOLIC BLOOD PRESSURE: 141 MMHG

## 2021-10-09 PROBLEM — K21.00 GASTROESOPHAGEAL REFLUX DISEASE WITH ESOPHAGITIS WITHOUT HEMORRHAGE: Status: ACTIVE | Noted: 2021-10-09

## 2021-10-09 LAB
SARS-COV-2, RAPID: NOT DETECTED
SPECIMEN DESCRIPTION: NORMAL

## 2021-10-09 PROCEDURE — 6370000000 HC RX 637 (ALT 250 FOR IP): Performed by: INTERNAL MEDICINE

## 2021-10-09 PROCEDURE — 6370000000 HC RX 637 (ALT 250 FOR IP): Performed by: NURSE PRACTITIONER

## 2021-10-09 PROCEDURE — 87635 SARS-COV-2 COVID-19 AMP PRB: CPT

## 2021-10-09 PROCEDURE — 3700000001 HC ADD 15 MINUTES (ANESTHESIA): Performed by: INTERNAL MEDICINE

## 2021-10-09 PROCEDURE — 6360000002 HC RX W HCPCS: Performed by: FAMILY MEDICINE

## 2021-10-09 PROCEDURE — G0378 HOSPITAL OBSERVATION PER HR: HCPCS

## 2021-10-09 PROCEDURE — 6360000002 HC RX W HCPCS: Performed by: INTERNAL MEDICINE

## 2021-10-09 PROCEDURE — 2580000003 HC RX 258: Performed by: FAMILY MEDICINE

## 2021-10-09 PROCEDURE — 99232 SBSQ HOSP IP/OBS MODERATE 35: CPT | Performed by: FAMILY MEDICINE

## 2021-10-09 PROCEDURE — 43239 EGD BIOPSY SINGLE/MULTIPLE: CPT | Performed by: INTERNAL MEDICINE

## 2021-10-09 PROCEDURE — 76705 ECHO EXAM OF ABDOMEN: CPT

## 2021-10-09 PROCEDURE — 3700000000 HC ANESTHESIA ATTENDED CARE: Performed by: INTERNAL MEDICINE

## 2021-10-09 PROCEDURE — 88305 TISSUE EXAM BY PATHOLOGIST: CPT

## 2021-10-09 PROCEDURE — 1200000000 HC SEMI PRIVATE

## 2021-10-09 PROCEDURE — 2500000003 HC RX 250 WO HCPCS: Performed by: ANESTHESIOLOGY

## 2021-10-09 PROCEDURE — 6360000002 HC RX W HCPCS: Performed by: ANESTHESIOLOGY

## 2021-10-09 PROCEDURE — 2709999900 HC NON-CHARGEABLE SUPPLY: Performed by: INTERNAL MEDICINE

## 2021-10-09 PROCEDURE — 7100000001 HC PACU RECOVERY - ADDTL 15 MIN: Performed by: INTERNAL MEDICINE

## 2021-10-09 PROCEDURE — 6370000000 HC RX 637 (ALT 250 FOR IP): Performed by: FAMILY MEDICINE

## 2021-10-09 PROCEDURE — 0DB58ZX EXCISION OF ESOPHAGUS, VIA NATURAL OR ARTIFICIAL OPENING ENDOSCOPIC, DIAGNOSTIC: ICD-10-PCS | Performed by: INTERNAL MEDICINE

## 2021-10-09 PROCEDURE — 3609012400 HC EGD TRANSORAL BIOPSY SINGLE/MULTIPLE: Performed by: INTERNAL MEDICINE

## 2021-10-09 PROCEDURE — 2580000003 HC RX 258: Performed by: INTERNAL MEDICINE

## 2021-10-09 PROCEDURE — 7100000000 HC PACU RECOVERY - FIRST 15 MIN: Performed by: INTERNAL MEDICINE

## 2021-10-09 PROCEDURE — C9113 INJ PANTOPRAZOLE SODIUM, VIA: HCPCS | Performed by: FAMILY MEDICINE

## 2021-10-09 RX ORDER — METOCLOPRAMIDE 5 MG/1
5 TABLET ORAL
Qty: 90 TABLET | Refills: 0 | Status: SHIPPED | OUTPATIENT
Start: 2021-10-09 | End: 2022-01-25 | Stop reason: SDUPTHER

## 2021-10-09 RX ORDER — LIDOCAINE HYDROCHLORIDE 10 MG/ML
INJECTION, SOLUTION EPIDURAL; INFILTRATION; INTRACAUDAL; PERINEURAL PRN
Status: DISCONTINUED | OUTPATIENT
Start: 2021-10-09 | End: 2021-10-09 | Stop reason: SDUPTHER

## 2021-10-09 RX ORDER — PANTOPRAZOLE SODIUM 40 MG/10ML
40 INJECTION, POWDER, LYOPHILIZED, FOR SOLUTION INTRAVENOUS DAILY
Status: DISCONTINUED | OUTPATIENT
Start: 2021-10-09 | End: 2021-10-10 | Stop reason: HOSPADM

## 2021-10-09 RX ORDER — METOCLOPRAMIDE HYDROCHLORIDE 5 MG/ML
10 INJECTION INTRAMUSCULAR; INTRAVENOUS ONCE
Status: COMPLETED | OUTPATIENT
Start: 2021-10-09 | End: 2021-10-09

## 2021-10-09 RX ORDER — SODIUM CHLORIDE 9 MG/ML
10 INJECTION INTRAVENOUS DAILY
Status: DISCONTINUED | OUTPATIENT
Start: 2021-10-09 | End: 2021-10-10 | Stop reason: HOSPADM

## 2021-10-09 RX ORDER — SUCRALFATE 1 G/1
1 TABLET ORAL EVERY 6 HOURS SCHEDULED
Status: DISCONTINUED | OUTPATIENT
Start: 2021-10-09 | End: 2021-10-10 | Stop reason: HOSPADM

## 2021-10-09 RX ORDER — CALCIUM CARBONATE 200(500)MG
500 TABLET,CHEWABLE ORAL 3 TIMES DAILY PRN
Status: DISCONTINUED | OUTPATIENT
Start: 2021-10-09 | End: 2021-10-10 | Stop reason: HOSPADM

## 2021-10-09 RX ORDER — CALCIUM CARBONATE 200(500)MG
1 TABLET,CHEWABLE ORAL DAILY
Qty: 30 TABLET | Refills: 0 | COMMUNITY
Start: 2021-10-09 | End: 2021-11-08

## 2021-10-09 RX ORDER — PROPOFOL 10 MG/ML
INJECTION, EMULSION INTRAVENOUS PRN
Status: DISCONTINUED | OUTPATIENT
Start: 2021-10-09 | End: 2021-10-09 | Stop reason: SDUPTHER

## 2021-10-09 RX ORDER — PANTOPRAZOLE SODIUM 40 MG/1
40 TABLET, DELAYED RELEASE ORAL
Qty: 30 TABLET | Refills: 1 | Status: SHIPPED | OUTPATIENT
Start: 2021-10-09 | End: 2022-01-25 | Stop reason: SDUPTHER

## 2021-10-09 RX ADMIN — LEVETIRACETAM 750 MG: 750 TABLET ORAL at 08:27

## 2021-10-09 RX ADMIN — SODIUM CHLORIDE, PRESERVATIVE FREE 10 ML: 5 INJECTION INTRAVENOUS at 11:38

## 2021-10-09 RX ADMIN — PROPOFOL 50 MG: 10 INJECTION, EMULSION INTRAVENOUS at 13:07

## 2021-10-09 RX ADMIN — Medication 500 MG: at 22:33

## 2021-10-09 RX ADMIN — LEVETIRACETAM 750 MG: 750 TABLET ORAL at 20:38

## 2021-10-09 RX ADMIN — PROPOFOL 50 MG: 10 INJECTION, EMULSION INTRAVENOUS at 13:00

## 2021-10-09 RX ADMIN — SODIUM CHLORIDE: 9 INJECTION, SOLUTION INTRAVENOUS at 05:12

## 2021-10-09 RX ADMIN — METOCLOPRAMIDE 10 MG: 5 INJECTION, SOLUTION INTRAMUSCULAR; INTRAVENOUS at 16:09

## 2021-10-09 RX ADMIN — PROPOFOL 50 MG: 10 INJECTION, EMULSION INTRAVENOUS at 13:01

## 2021-10-09 RX ADMIN — ONDANSETRON 4 MG: 2 INJECTION INTRAMUSCULAR; INTRAVENOUS at 05:13

## 2021-10-09 RX ADMIN — SODIUM CHLORIDE: 9 INJECTION, SOLUTION INTRAVENOUS at 12:57

## 2021-10-09 RX ADMIN — POLYETHYLENE GLYCOL 3350 17 G: 17 POWDER, FOR SOLUTION ORAL at 20:46

## 2021-10-09 RX ADMIN — SUCRALFATE 1 G: 1 TABLET ORAL at 20:38

## 2021-10-09 RX ADMIN — PROPOFOL 50 MG: 10 INJECTION, EMULSION INTRAVENOUS at 13:04

## 2021-10-09 RX ADMIN — PANTOPRAZOLE SODIUM 40 MG: 40 INJECTION, POWDER, FOR SOLUTION INTRAVENOUS at 11:38

## 2021-10-09 RX ADMIN — LIDOCAINE HYDROCHLORIDE 50 MG: 10 INJECTION, SOLUTION EPIDURAL; INFILTRATION; INTRACAUDAL; PERINEURAL at 13:00

## 2021-10-09 RX ADMIN — SODIUM CHLORIDE: 9 INJECTION, SOLUTION INTRAVENOUS at 21:36

## 2021-10-09 ASSESSMENT — PULMONARY FUNCTION TESTS
PIF_VALUE: 2
PIF_VALUE: 1
PIF_VALUE: 37
PIF_VALUE: 1

## 2021-10-09 ASSESSMENT — ENCOUNTER SYMPTOMS
NAUSEA: 1
WHEEZING: 0
CONSTIPATION: 0
DIARRHEA: 0
CHEST TIGHTNESS: 0
VOMITING: 1
BLOOD IN STOOL: 0
SHORTNESS OF BREATH: 0
RHINORRHEA: 0
ABDOMINAL PAIN: 0
COUGH: 0

## 2021-10-09 ASSESSMENT — PAIN SCALES - GENERAL
PAINLEVEL_OUTOF10: 0
PAINLEVEL_OUTOF10: 0

## 2021-10-09 NOTE — DISCHARGE SUMMARY
[facility]  Office: [phone]  Nirali Segal Blood DO, Galina Brock MD, Antonio Berkowitz MD, Amarjit Horton MD, Sherley Rossi MD, Kiel Snider MD, Karl Berumen MD, Chilo Conner MD, Shelbie Diaz MD, Edu Bradley MD, Bruno Bella DO, Michoacano Diaz MD, Zachary Brooks MD, Radha Linda DO, Zulema Carrillo MD,  Alvarez Cordero DO, Janie Martin MD, Rosy Sotomayor MD, Chung Cruz [facility], [facility] CNP, Nic Tinoco CNP, Hussain Barahona CNS, Elizabeth Fox CNP, Tu Everett CNP, Baron Lesch CNP, Beth Morales CNP, Noemy Wang CNP, Katrina Macario PA-C, Denver Parks CNP, Michel Sher CNP, Dwight North CNP, Yuliya Coventry CNP, Emma Moise [facility], [place] [address]      Discharge Summary     Patient ID: Cinthia Valdivia  :  1980   MRN: [id number]     ACCOUNT:  [de-identified]   Patient Location :   Patient's PCP: Lalit Barajas MD  Admit Date: 10/7/2021   Discharge Date:  10/09/21    Length of Stay: 0  Code Status:  Full Code  Admitting Physician: Amarjit Horton MD  Discharge Physician: Amarjit Horton MD     Active Discharge Diagnosis :     Primary Problem  Intractable nausea and vomiting      [facility]    Diagnosis Date Noted    Gastroesophageal reflux disease with esophagitis without hemorrhage [K21.00] 10/09/2021    Intractable nausea and vomiting [R11.2] 10/07/2021    Marijuana use [F12.90] 10/07/2021    Moderate persistent asthma without complication [I25.56]     GERD (gastroesophageal reflux disease) [K21.9] 2012    Seizure ([address] [R56.9] 2012       Admission Condition:  fair     Discharged Condition: stable    Hospital Stay:     Hospital Course:  Cinthia Valdivia is a 46 y.o. female who was admitted for the management of   Intractable nausea and vomiting , presented to ER with Emesis (onset this morning. clear emesis in basin. )  Patient came to emergency room with intractable nausea and vomiting for last few days.  Patient reported that she was unable to keep anything down.  She denies any blood in vomitus.  Patient also reported abdominal pain.  She reports history of chronic abdominal pain for last 18 years. Kenny Humphrey has been diagnosed with irritable bowel syndrome and is on Bentyl.  Patient also is current smoker of marijuana which she uses intermittently.  He reported last use was 10 days before. Александр Bloom does not report that she had previous injury.  She has history of moderate persistent asthma.   Evaluation emergency room showed hypokalemia 3.4 otherwise normal kidney function, WBC.  Pregnancy test was negative. Patient was treated with IV fluids, IV antiemetics. She continued to have abdominal pain, nausea, vomiting. She was started on PPI and GI was consulted and recommended EGD. EGD was performed on 10/9/2021 and showed gastritis with esophagitis. Biopsies were taken. Patient was discharged on Reglan and Protonix with plan to follow-up with GI in 6 weeks for repeat EGD and biopsy. Significant therapeutic interventions:   Intractable nausea vomiting likely secondary to marijuana use and  gastritis-  ultrasound gallbladder negative for GB disease. EGD showed gastritis and esophagitis. PPI and Reglan for 6 weeks. Follow-up with GI for repeat EGD in 6 weeks. Marijuana use-recommend complete abstinence  Moderate persistent asthma-albuterol as needed, add Pulmicort. Seizure disorder-continue Keppra.   Hypokalemia-replaced.       Significant Diagnostic Studies:   Labs / Micro:/Radiology  Recent Labs     10/08/21  0519 10/07/21  1545 09/13/21  0810   WBC 8.6 6.1 11.5*   HGB 12.3 13.4 15.7*   HCT 34.3* 37.7 43.6   MCV 84.3 85.7 84.2    303 343     Labs Renal Latest Ref Rng & Units 10/8/2021 10/7/2021 9/13/2021 3/16/2021 3/11/2021   BUN 6 - 20 mg/dL 10 8 14 11 15   Cr 0.50 - 0.90 mg/dL 0.68 0.70 0.92(H) 0.81 0.89 K 3.7 - 5.3 mmol/L 3.4(L) 3.4(L) 3. 2(L) 3. 3(L) 4.6   Na 135 - 144 mmol/L 142 136 136 138 136     Lab Results   Component Value Date    ALT 11 10/07/2021    AST 19 10/07/2021    ALKPHOS 47 10/07/2021    BILITOT 0.30 10/07/2021     Lab Results   Component Value Date    TSH 1.45 03/03/2014     No results found for: HAV, HEPAIGM, HEPBIGM, HEPBCAB, HBEAG, HEPCAB  Lab Results   Component Value Date    COLORU YELLOW 09/13/2021    NITRU NEGATIVE 09/13/2021    GLUCOSEU NEGATIVE 09/13/2021    GLUCOSEU NEGATIVE 03/21/2012    KETUA 1+ 09/13/2021    UROBILINOGEN Normal 09/13/2021    BILIRUBINUR NEGATIVE 09/13/2021    BILIRUBINUR NEGATIVE  Verified by ictotest. 03/21/2012     No results found for: LABA1C  No results found for: EAG  Lab Results   Component Value Date    INR 1.1 10/08/2021    PROTIME 14.0 10/08/2021       US GALLBLADDER RUQ    Result Date: 10/9/2021  1. No acute abnormality. Consultations:    Consults:     Final Specialist Recommendations/Findings:   IP CONSULT TO HOSPITALIST  IP CONSULT TO GI      The patient was seen and examined on day of discharge and this discharge summary is in conjunction with any daily progress note from day of discharge. Discharge plan:     Disposition: Home    Physician Follow Up: Follow-up with GI. No follow-up provider specified. Requiring Further Evaluation/Follow Up POST HOSPITALIZATION/Incidental Findings: Gastritis and esophagitis on EGD. Medication as advised. Follow-up as advised. Diet: regular diet    Activity: As tolerated. Instructions to Patient: Complete abstinence of marijuana. Discharge Medications:      Medication List      START taking these medications    calcium carbonate 500 MG chewable tablet  Commonly known as:  Antacid  Take 1 tablet by mouth daily     pantoprazole 40 MG tablet  Commonly known as: PROTONIX  Take 1 tablet by mouth every morning (before breakfast)        CHANGE how you take these medications    dicyclomine 10 MG capsule  Commonly known as: Bentyl  Take 1 capsule by mouth every 6 hours as needed (cramps)  What changed: Another medication with the same name was removed. Continue taking this medication, and follow the directions you see here. metoclopramide 5 MG tablet  Commonly known as: REGLAN  Take 1 tablet by mouth 3 times daily (with meals)  What changed:   medication strength  how much to take  when to take this  additional instructions        CONTINUE taking these medications    levETIRAcetam 750 MG tablet  Commonly known as: KEPPRA  TAKE ONE TABLET BY MOUTH TWICE A DAY     * ondansetron 4 MG disintegrating tablet  Commonly known as: Zofran ODT  Take 1 tablet by mouth every 8 hours as needed for Nausea     * ondansetron 4 MG disintegrating tablet  Commonly known as: Zofran ODT  Take 1 tablet by mouth every 8 hours as needed for Nausea     potassium chloride 20 MEQ extended release tablet  Commonly known as: KLOR-CON M  Take 1 tablet by mouth daily for 5 days     vitamin D3 25 MCG (1000 UT) Tabs tablet  Commonly known as: CHOLECALCIFEROL  ONE BY MOUTH DAILY         * This list has 2 medication(s) that are the same as other medications prescribed for you. Read the directions carefully, and ask your doctor or other care provider to review them with you.             STOP taking these medications    albuterol sulfate  (90 Base) MCG/ACT inhaler     famotidine 20 MG tablet  Commonly known as: PEPCID           Where to Get Your Medications      These medications were sent to [facility], [address]  [address], [address] [place] [address]    Phone: [phone]   metoclopramide 5 MG tablet  pantoprazole 40 MG tablet     You can get these medications from any pharmacy    You don't need a prescription for these medications  calcium carbonate 500 MG chewable tablet         Time Spent on discharge is  25 mins in patient examination, evaluation, counseling as well as medication reconciliation, prescriptions for required medications, discharge plan and follow up. Electronically signed by   Nils Etienne MD  10/9/2021        Thank you Dr. Ricci Vasquez MD for the opportunity to be involved in this patient's care.

## 2021-10-09 NOTE — PROGRESS NOTES
Pt returned from PACU to room 2003  VSS   Reoriented to room  Pt given lunch box and water  Possible discharge if tolerating food

## 2021-10-09 NOTE — ANESTHESIA PRE PROCEDURE
injection 5-40 mL  5-40 mL IntraVENous 2 times per day Denisse Luke MD        sodium chloride flush 0.9 % injection 10 mL  10 mL IntraVENous PRN Denisse Luke MD   10 mL at 10/09/21 1138    0.9 % sodium chloride infusion  25 mL IntraVENous PRN Denisse Luke MD        potassium chloride (KLOR-CON M) extended release tablet 40 mEq  40 mEq Oral PRN Denisse Luke MD   40 mEq at 10/08/21 0904    Or    potassium bicarb-citric acid (EFFER-K) effervescent tablet 40 mEq  40 mEq Oral PRN Denisse Luke MD        Or   Verl Raw potassium chloride 10 mEq/100 mL IVPB (Peripheral Line)  10 mEq IntraVENous PRN Denisse Luke MD        magnesium sulfate 1000 mg in dextrose 5% 100 mL IVPB  1,000 mg IntraVENous PRN Denisse Luke MD        [Held by provider] enoxaparin (LOVENOX) injection 40 mg  40 mg SubCUTAneous Daily Denisse Luke MD   40 mg at 10/08/21 0909    ondansetron (ZOFRAN-ODT) disintegrating tablet 4 mg  4 mg Oral Q8H PRN Denisse Luke MD        Or    ondansetron [facility]) injection 4 mg  4 mg IntraVENous Q6H PRN Denisse Luke MD   4 mg at 10/09/21 0513    polyethylene glycol (GLYCOLAX) packet 17 g  17 g Oral Daily PRN Denisse Luke MD        acetaminophen (TYLENOL) tablet 650 mg  650 mg Oral Q6H PRN Denisse Luke MD   650 mg at 10/08/21 8155    Or    acetaminophen (TYLENOL) suppository 650 mg  650 mg Rectal Q6H PRN Denisse Luke MD        0.9 % sodium chloride infusion   IntraVENous Continuous Denisse Luke MD 75 mL/hr at 10/09/21 0512 New Bag at 10/09/21 0512    promethazine (PHENERGAN) injection 12.5 mg  12.5 mg IntraVENous Q6H PRN Denisse Luke MD   12.5 mg at 10/08/21 2342    levETIRAcetam (KEPPRA) tablet 750 mg  750 mg Oral BID Denisse Luke MD   750 mg at 10/09/21 0827    albuterol (PROVENTIL) nebulizer solution 2.5 mg  2.5 mg Nebulization Q6H PRN Denisse Luke MD           Allergies:     Allergies   Allergen Reactions    Omeprazole      Pt had a seizure       Problem List:    Patient Active Problem List   Diagnosis Code    GERD (gastroesophageal reflux disease) K21.9    Mental retardation F79    Seizure ([facility] [address] R56.9    Irritable bowel syndrome without diarrhea K58.9    Intractable nausea and vomiting R11.2    Marijuana use F12.90    Moderate persistent asthma without complication F54.20       Past Medical History:        Diagnosis Date    GERD (gastroesophageal reflux disease)     Irritable bowel syndrome without diarrhea 2016    Moderate persistent asthma without complication     Moderate persistent asthma without complication     Seizures ([facility]     2013, 14       Past Surgical History:        Procedure Laterality Date     SECTION          TUBAL LIGATION             Social History:    Social History     Tobacco Use    Smoking status: Former Smoker     Packs/day: 1.00     Years: 10.00     Pack years: 10.00     Types: Cigarettes     Quit date: 3/7/2014     Years since quittin.5    Smokeless tobacco: Never Used    Tobacco comment: denies current use    Substance Use Topics    Alcohol use: No                                Counseling given: Not Answered  Comment: denies current use       Vital Signs (Current):   Vitals:    10/08/21 1141 10/08/21 1919 10/09/21 0403 10/09/21 0747   BP: 113/62 135/79  132/74   Pulse: 70 68  73   Resp: 16 16  18   Temp: 98.4 °F (36.9 °C) 98.1 °F (36.7 °C)  98.8 °F (37.1 °C)   TempSrc: Oral Oral  Oral   SpO2: 96% 98%  97%   Weight:   117 lb (53.1 kg)    Height:                                                  BP Readings from Last 3 Encounters:   10/09/21 132/74   21 (!) 124/95   21 (!) 129/90       NPO Status:                                                                                 BMI:   Wt Readings from Last 3 Encounters:   10/09/21 117 lb (53.1 kg)   21 108 lb 8 oz (49.2 kg)   21 115 lb 12.8 oz (52.5 kg)     Body mass index is 21.4 kg/m².     CBC:   Lab Results   Component Value Date    WBC 8.6 10/08/2021    RBC 4.07 10/08/2021    RBC 4.75 03/21/2012    HGB 12.3 10/08/2021    HCT 34.3 10/08/2021    MCV 84.3 10/08/2021    RDW 11.9 10/08/2021     10/08/2021     03/21/2012       CMP:   Lab Results   Component Value Date     10/08/2021    K 3.4 10/08/2021     10/08/2021    CO2 23 10/08/2021    BUN 10 10/08/2021    CREATININE 0.68 10/08/2021    GFRAA >60 10/08/2021    LABGLOM >60 10/08/2021    GLUCOSE 108 10/08/2021    GLUCOSE 118 03/21/2012    PROT 7.3 10/07/2021    CALCIUM 8.7 10/08/2021    BILITOT 0.30 10/07/2021    ALKPHOS 47 10/07/2021    AST 19 10/07/2021    ALT 11 10/07/2021       POC Tests: No results for input(s): POCGLU, POCNA, POCK, POCCL, POCBUN, POCHEMO, POCHCT in the last 72 hours. Coags:   Lab Results   Component Value Date    PROTIME 14.0 10/08/2021    INR 1.1 10/08/2021       HCG (If Applicable):   Lab Results   Component Value Date    PREGTESTUR neg 09/13/2021        ABGs: No results found for: PHART, PO2ART, VEC4TWU, GQG0ZXU, BEART, A1VGGRPO     Type & Screen (If Applicable):  No results found for: LABABO, LABRH    Drug/Infectious Status (If Applicable):  No results found for: HIV, HEPCAB    COVID-19 Screening (If Applicable):   Lab Results   Component Value Date    COVID19 Not Detected 10/09/2021    COVID19 Not Detected 03/16/2021           Anesthesia Evaluation  Patient summary reviewed and Nursing notes reviewed no history of anesthetic complications:   Airway: Mallampati: II  TM distance: >3 FB   Neck ROM: full  Mouth opening: > = 3 FB Dental: normal exam         Pulmonary:normal exam    (+) asthma:                            Cardiovascular:  Exercise tolerance: good (>4 METS),       (-) CAD and CABG/stent        Rate: normal                    Neuro/Psych:   (+) psychiatric history:            GI/Hepatic/Renal:   (+) GERD:,           Endo/Other:                     Abdominal:             Vascular:           Other Findings:             Anesthesia

## 2021-10-09 NOTE — PLAN OF CARE
Problem: Fluid Volume - Imbalance:  Goal: Absence of imbalanced fluid volume signs and symptoms  Description: Absence of imbalanced fluid volume signs and symptoms  10/8/2021 2024 by Pat Power RN  Outcome: Ongoing     IV fluids running as ordered, PRN antiemetic given as needed. Emotional support offered.

## 2021-10-09 NOTE — PROGRESS NOTES
Protonix changed from IV to PO per [facility] approved policy. Basic Criteria (please refer to hospital policy for details):  1. functioning GI tract  2. tolerating PO/NG routine medications    Thank you.   Frankey Lutes, PharmD  Inpatient Pharmacist  10/8/2021 10:11 PM

## 2021-10-09 NOTE — OP NOTE
PROCEDURE NOTE    DATE OF PROCEDURE: 10/9/2021     SURGEON: Faizan Calix MD  Facility: [facility] [address] DOLLY PEREZ  ASSISTANT: None  Anesthesia: MAc   PREOPERATIVE DIAGNOSIS:   Abdominal pain  Nausea and vomiting    Diagnosis:  Inflamed GE junction with polypoidal formation   Biopsies were taken  Patient is to be on PPI for 4 to 6 weeks and go back on take more biopsies from the same area      Gastritis  Biopsies taken    Retained liquid indicating some delay in emptying        POSTOPERATIVE DIAGNOSIS: As described below    OPERATION: Upper GI endoscopy with Biopsy    ANESTHESIA: Moderate Sedation     ESTIMATED BLOOD LOSS: Less than 50 ml    COMPLICATIONS: None. SPECIMENS:  Was Obtained:     As above     HISTORY: The patient is a 46y.o. year old female with history of above preop diagnosis. I recommended esophagogastroduodenoscopy with possible biopsy and I explained the risk, benefits, expected outcome, and alternatives to the procedure. Risks included but are not limited to bleeding, infection, respiratory distress, hypotension, and perforation of the esophagus, stomach, or duodenum. Patient understands and is in agreement. The patient was counseled at length about the risks of demetrius Covid-19 during their perioperative period and any recovery window from their procedure. The patient was made aware that demetrius Covid-19  may worsen their prognosis for recovering from their procedure  and lend to a higher morbidity and/or mortality risk. All material risks, benefits, and reasonable alternatives including postponing the procedure were discussed. The patient does wish to proceed with the procedure at this time. PROCEDURE: The patient was given IV conscious sedation. The patient's SPO2 remained above 90% throughout the procedure. The gastroscope was inserted orally and advanced under direct vision through the esophagus, through the stomach, through the pylorus, and into the descending duodenum. Post sedation note : The patient's SPO2 remained above 90% throughout the procedure. the vital signs remained stable , and no immediate complication form the procedure noted, patient will be ready for d/c when criteria is met . Findings:    Retropharyngeal area was grossly normal appearing    Esophagus: abnormal: Inflamed GE junction with polypoidal formation   Biopsies were taken  Patient is to be on PPI for 4 to 6 weeks and go back on take more biopsies from the same area          Stomach:  Gastritis  Biopsies taken    Retained liquid indicating some delay in emptying    Duodenum:     Descending: normal    Bulb: normal    The scope was removed and the patient tolerated the procedure well. Recommendations/Plan:   1. F/U Biopsies  2. PPI and Reglan  3. Repeat EGD in 4 to 6 weeks  4. Discharge when patient symptoms are controlled, to follow as an outpatient  5. F/U In Office in 3-4 weeks  6.  Discussed with the family    Electronically signed by Judy Perez MD  on 10/9/2021 at 1:11 PM

## 2021-10-09 NOTE — ANESTHESIA POSTPROCEDURE EVALUATION
Department of Anesthesiology  Postprocedure Note    Patient: Houston Vargas  MRN: [id number]  YOB: 1980  Date of evaluation: 10/9/2021  Time:  1:57 PM     Procedure Summary     Date: 10/09/21 Room / Location: [place] [address]    Anesthesia Start: 3787 Anesthesia Stop: 7696    Procedure: EGD BIOPSY (N/A ) Diagnosis: (NAUSEA AND VOMITING)    Surgeons: Brittany Moreland MD Responsible Provider: Jovana Ho DO    Anesthesia Type: MAC, general ASA Status: 3          Anesthesia Type: MAC, general    London Phase I: London Score: 10    London Phase II:      Last vitals: Reviewed and per EMR flowsheets.        Anesthesia Post Evaluation    Patient location during evaluation: PACU  Patient participation: complete - patient participated  Level of consciousness: awake and alert  Airway patency: patent  Nausea & Vomiting: no nausea and no vomiting  Complications: no  Cardiovascular status: hemodynamically stable  Respiratory status: acceptable  Hydration status: stable

## 2021-10-09 NOTE — PLAN OF CARE
Problem: Fluid Volume - Imbalance:  Goal: Absence of imbalanced fluid volume signs and symptoms  Description: Absence of imbalanced fluid volume signs and symptoms  Outcome: Ongoing  Note: Pt receiving IVF  Tolerating clear liquids without nausea      Problem: Nutrition Deficit:  Goal: Ability to achieve adequate nutritional intake will improve  Description: Ability to achieve adequate nutritional intake will improve  Outcome: Ongoing  Note: Patient taking clears without issues  No reporting of nausea  Reluctant to try solid foods  Will continue to monitor

## 2021-10-09 NOTE — PROGRESS NOTES
[facility]  Office: [address], DO, Zack Gomez, DO, Qasim Denny, DO, Eros Quinn Blood, DO, Sundeep Fernandes MD, Jimmie Bermudez MD, Radha Atkinson MD, Bre Arechiga MD, Rolly Martins MD, Kelly Lara MD, Jenny Aviles MD, Bhakti Fuchs, DO, Shayne Hugo, DO, Marko Putnam MD,  Shelbi Lugo, DO, Jerica Liz MD, Mary Desouza MD, Aliyah Rosas MD, Talha Ward MD, Milana Rea MD, Hannah Rajan MD, Jeni Grady, [facility], [facility], CNP, Jamie Murry, CNP, Lorraine Flores, CNS, Belen Baxter, CNP, Julio Martinez, CNP, Kary Faust, CNP, Chidi Taylor, CNP, Aggie Prieto, CNP, Tatiana Palacios PA-C, Charmel Osgood, [facility], Jazmín Bowman, CNP, Tami Quispe, CNP, Nan Bethea, CNP, Debo Lugo, CNP, Tiney Schilder, CNP, Marlyne Osgood, CNP, [facility]      Daily Progress Note     Admit Date: 10/7/2021  Bed/Room No.  2003/2003-02  Admitting Physician : Radha Atkinson MD  Code Status :Full Code  Hospital Day:  LOS: 0 days   Chief Complaint:     Chief Complaint   Patient presents with    Emesis     onset this morning. clear emesis in basin. Principal Problem:    Intractable nausea and vomiting  Active Problems:    GERD (gastroesophageal reflux disease)    Seizure (HCC)    Marijuana use    Moderate persistent asthma without complication  Resolved Problems:    * No resolved hospital problems. *    Subjective : Interval History/Significant events :  10/09/21    Patient patient reports improvement in abdominal pain with Protonix. She denies any nausea, vomiting today. She wants to eat. Patient scheduled for EGD today. She denies any blood in stool. Vitals - Stable afebrile  Labs -no new labs. Nursing notes , Consults notes reviewed. Overnight events and updates discussed with Nursing staff .    Background History:         Jonathan Feldman is 46 y.o. female  Who was admitted to the hospital on 10/7/2021 for treatment of Intractable nausea and vomiting. Patient came to emergency room with intractable nausea and vomiting for last few days. Patient reported that she was unable to keep anything down. She denies any blood in vomitus. Patient also reported abdominal pain. She reports history of chronic abdominal pain for last 18 years. She has been diagnosed with irritable bowel syndrome and is on Bentyl. Patient also is current smoker of marijuana which she uses intermittently. He reported last use was 10 days before. Patient does not report that she had previous injury. She has history of moderate persistent asthma. Evaluation emergency room showed hypokalemia 3.4 otherwise normal kidney function, WBC. Pregnancy test was negative. Patient was treated with IV fluids, IV antiemetics. She continued to have abdominal pain, nausea, vomiting. She was started on PPI and GI was consulted and recommended EGD. PMH:  Past Medical History:   Diagnosis Date    GERD (gastroesophageal reflux disease)     Irritable bowel syndrome without diarrhea 09/07/2016    Moderate persistent asthma without complication     Moderate persistent asthma without complication     [facility]     12/2013, 4/19/14      Allergies: Allergies   Allergen Reactions    Omeprazole      Pt had a seizure      Medications :  pantoprazole, 40 mg, Oral, BID AC  sodium chloride flush, 5-40 mL, IntraVENous, 2 times per day  enoxaparin, 40 mg, SubCUTAneous, Daily  levETIRAcetam, 750 mg, Oral, BID        Review of Systems   Review of Systems   Constitutional: Negative for appetite change, fatigue, fever and unexpected weight change. HENT: Negative for congestion, rhinorrhea and sneezing. Eyes: Negative for visual disturbance. Respiratory: Negative for cough, chest tightness, shortness of breath and wheezing. Cardiovascular: Negative for chest pain and palpitations. Gastrointestinal: Positive for nausea and vomiting.  Negative for abdominal pain, blood in stool, constipation and diarrhea. Genitourinary: Negative for dysuria, enuresis, frequency and hematuria. Musculoskeletal: Negative for arthralgias and myalgias. Skin: Negative for rash. Neurological: Negative for dizziness, weakness, light-headedness and headaches. Hematological: Does not bruise/bleed easily. Psychiatric/Behavioral: Negative for dysphoric mood and sleep disturbance. Objective :      Current Vitals : Temp: 98.8 °F (37.1 °C),  Pulse: 73, Resp: 18, BP: 132/74, SpO2: 97 %  Last 24 Hrs Vitals   Patient Vitals for the past 24 hrs:   BP Temp Temp src Pulse Resp SpO2 Weight   10/09/21 0747 132/74 98.8 °F (37.1 °C) Oral 73 18 97 % --   10/09/21 0403 -- -- -- -- -- -- 117 lb (53.1 kg)   10/08/21 1919 135/79 98.1 °F (36.7 °C) Oral 68 16 98 % --   10/08/21 1141 113/62 98.4 °F (36.9 °C) Oral 70 16 96 % --     Intake / output   10/08 0701 - 10/09 0700  In: 2124.3 [P.O.:320; I.V.:1804.3]  Out: 1800 [Urine:1300]  Physical Exam:  Physical Exam  Vitals and nursing note reviewed. Constitutional:       General: She is not in acute distress. Appearance: She is not diaphoretic. HENT:      Head: Normocephalic and atraumatic. Nose:      Right Sinus: No maxillary sinus tenderness or frontal sinus tenderness. Left Sinus: No maxillary sinus tenderness or frontal sinus tenderness. Mouth/Throat:      Pharynx: No oropharyngeal exudate. Eyes:      General: No scleral icterus. Conjunctiva/sclera: Conjunctivae normal.      Pupils: Pupils are equal, round, and reactive to light. Neck:      Thyroid: No thyromegaly. Vascular: No JVD. Cardiovascular:      Rate and Rhythm: Normal rate and regular rhythm. Pulses:           Dorsalis pedis pulses are 2+ on the right side and 2+ on the left side. Heart sounds: Normal heart sounds. No murmur heard. Pulmonary:      Effort: Pulmonary effort is normal.      Breath sounds: Normal breath sounds.  No wheezing or rales.   Abdominal:      Palpations: Abdomen is soft. There is no mass. Tenderness: There is abdominal tenderness in the epigastric area. Musculoskeletal:      Cervical back: Full passive range of motion without pain and neck supple. Lymphadenopathy:      Head:      Right side of head: No submandibular adenopathy. Left side of head: No submandibular adenopathy. Cervical: No cervical adenopathy. Skin:     General: Skin is warm. Neurological:      Mental Status: She is alert and oriented to person, place, and time. Motor: No tremor. Psychiatric:         Behavior: Behavior is cooperative. Laboratory findings:    Recent Labs     10/07/21  1545 10/08/21  0519   WBC 6.1 8.6   HGB 13.4 12.3   HCT 37.7 34.3*    257   INR  --  1.1     Recent Labs     10/07/21  1545 10/08/21  0519    142   K 3.4* 3.4*   CL 99 105   CO2 24 23   GLUCOSE 126* 108*   BUN 8 10   CREATININE 0.70 0.68   MG  --  1.9   CALCIUM 9.2 8.7     Recent Labs     10/07/21  1545   PROT 7.3   LABALBU 4.6   AST 19   ALT 11   ALKPHOS 47   BILITOT 0.30   BILIDIR 0.08   AMYLASE 91   LIPASE 12*          Specific Gravity, UA   Date Value Ref Range Status   09/13/2021 1.025 1.005 - 1.030 Final     Protein, UA   Date Value Ref Range Status   09/13/2021 1+ (A) NEGATIVE Final     RBC, UA   Date Value Ref Range Status   09/13/2021 0 TO 2 0 - 2 /HPF Final     Bacteria, UA   Date Value Ref Range Status   09/13/2021 FEW (A) None Final     Nitrite, Urine   Date Value Ref Range Status   09/13/2021 NEGATIVE NEGATIVE Final     WBC, UA   Date Value Ref Range Status   09/13/2021 10 TO 20 0 - 5 /HPF Final     Leukocyte Esterase, Urine   Date Value Ref Range Status   09/13/2021 NEGATIVE NEGATIVE Final       Imaging / Clinical Data :-   No results found.      Clinical Course : Unchanged    Assessment and Plan  :        Intractable nausea vomiting likely secondary to marijuana use and  gastritis-  ultrasound gallbladder negative for GB disease. Follow EGD results. Marijuana use-recommend complete abstinence  Moderate persistent asthma-albuterol as needed, add Pulmicort. Seizure disorder-continue Keppra. Plan and updates discussed with patient ,  answers  explained to satisfaction.    Plan discussed with Staff Parker Kern RN     (Please note that portions of this note were completed with a voice recognition program. Efforts were made to edit the dictations but occasionally words are mis-transcribed.)      Lopez Bradshaw MD  10/9/2021

## 2021-10-10 VITALS
BODY MASS INDEX: 21.35 KG/M2 | SYSTOLIC BLOOD PRESSURE: 125 MMHG | WEIGHT: 116 LBS | OXYGEN SATURATION: 99 % | HEIGHT: 62 IN | DIASTOLIC BLOOD PRESSURE: 72 MMHG | RESPIRATION RATE: 16 BRPM | HEART RATE: 70 BPM | TEMPERATURE: 98.1 F

## 2021-10-10 PROCEDURE — 99238 HOSP IP/OBS DSCHRG MGMT 30/<: CPT | Performed by: FAMILY MEDICINE

## 2021-10-10 PROCEDURE — 2580000003 HC RX 258: Performed by: INTERNAL MEDICINE

## 2021-10-10 PROCEDURE — 6370000000 HC RX 637 (ALT 250 FOR IP): Performed by: FAMILY MEDICINE

## 2021-10-10 PROCEDURE — 99232 SBSQ HOSP IP/OBS MODERATE 35: CPT | Performed by: INTERNAL MEDICINE

## 2021-10-10 PROCEDURE — C9113 INJ PANTOPRAZOLE SODIUM, VIA: HCPCS | Performed by: INTERNAL MEDICINE

## 2021-10-10 PROCEDURE — APPSS30 APP SPLIT SHARED TIME 16-30 MINUTES: Performed by: NURSE PRACTITIONER

## 2021-10-10 PROCEDURE — 6370000000 HC RX 637 (ALT 250 FOR IP): Performed by: INTERNAL MEDICINE

## 2021-10-10 PROCEDURE — 6360000002 HC RX W HCPCS: Performed by: INTERNAL MEDICINE

## 2021-10-10 RX ADMIN — SODIUM CHLORIDE: 9 INJECTION, SOLUTION INTRAVENOUS at 06:13

## 2021-10-10 RX ADMIN — SUCRALFATE 1 G: 1 TABLET ORAL at 06:13

## 2021-10-10 RX ADMIN — SODIUM CHLORIDE 10 ML: 9 INJECTION, SOLUTION INTRAMUSCULAR; INTRAVENOUS; SUBCUTANEOUS at 09:17

## 2021-10-10 RX ADMIN — SUCRALFATE 1 G: 1 TABLET ORAL at 00:21

## 2021-10-10 RX ADMIN — PANTOPRAZOLE SODIUM 40 MG: 40 INJECTION, POWDER, FOR SOLUTION INTRAVENOUS at 09:15

## 2021-10-10 RX ADMIN — LEVETIRACETAM 750 MG: 750 TABLET ORAL at 09:15

## 2021-10-10 RX ADMIN — SUCRALFATE 1 G: 1 TABLET ORAL at 11:20

## 2021-10-10 ASSESSMENT — ENCOUNTER SYMPTOMS
COUGH: 0
DIARRHEA: 0
CHEST TIGHTNESS: 0
VOMITING: 0
BLOOD IN STOOL: 0
SHORTNESS OF BREATH: 0
NAUSEA: 0
CONSTIPATION: 0
ABDOMINAL PAIN: 0
WHEEZING: 0
RHINORRHEA: 0

## 2021-10-10 NOTE — PLAN OF CARE
Problem: Nutrition Deficit:  Goal: Ability to achieve adequate nutritional intake will improve  Description: Ability to achieve adequate nutritional intake will improve  10/10/2021 1136 by Reynaldo Jara RN  Outcome: Ongoing  Note: Pt tolerating orals and a regular diet with no complaints of nausea      Problem: Safety:  Goal: Free from accidental physical injury  Description: Free from accidental physical injury  10/10/2021 1136 by Reynaldo Jara RN  Outcome: Ongoing  Note: Proper pt identification  Hourly rounding performed  Anticipatory needs met  Non-skid socks worn  Proper transferring technique  2/4 side rails up  Personal necessities within reach  Bed low and locked  Call light in reach  Proper lighting  Room free of clutter  Continue to monitor     Problem: Daily Care:  Goal: Daily care needs are met  Description: Daily care needs are met  10/10/2021 1136 by Reynaldo Jara RN  Outcome: Ongoing  Note: Assess patient self-care activities  Encourage patient to perform ADL's as tolerated  Include family when possible

## 2021-10-10 NOTE — PROGRESS NOTES
[facility]    Gastroenterology Daily Progress Note      Patient:   Misty Abraham   :    1980   Facility:   [facility]  Date:     10/10/2021  Consultant:   PRESTON Story CNP, CNP      SUBJECTIVE  46 y.o. female admitted 10/7/2021 with Intractable nausea and vomiting [R11.2]  Intractable vomiting with nausea, unspecified vomiting type [R11.2] and seen for abdominal pain with nausea and emesis. The pt was seen and examined. She is s/p egd yesterday; results are discussed below. No c/o abdominal pain or nausea today. Tolerating a diet. bx results are pending.          OBJECTIVE  Scheduled Meds:   pantoprazole  40 mg IntraVENous Daily    And    sodium chloride (PF)  10 mL IntraVENous Daily    sucralfate  1 g Oral 4 times per day    sodium chloride flush  5-40 mL IntraVENous 2 times per day    [Held by provider] enoxaparin  40 mg SubCUTAneous Daily    levETIRAcetam  750 mg Oral BID       Vital Signs:  /72   Pulse 70   Temp 98.1 °F (36.7 °C) (Oral)   Resp 16   Ht 5' 2\" (1.575 m)   Wt 116 lb (52.6 kg)   LMP 2021   SpO2 99%   BMI 21.22 kg/m²      Physical Exam:     General Appearance: alert and oriented to person, place and time, well-developed and well-nourished, in no acute distress  Skin: warm and dry, no rash or erythema  Head: normocephalic and atraumatic  Eyes: pupils equal, round, and reactive to light, extraocular eye movements intact, conjunctivae normal  ENT: hearing grossly normal bilaterally  Neck: neck supple and non tender without mass, no thyromegaly or thyroid nodules, no cervical lymphadenopathy   Pulmonary/Chest: clear to auscultation bilaterally- no wheezes, rales or rhonchi, normal air movement, no respiratory distress  Cardiovascular: normal rate, regular rhythm, normal S1 and S2, no murmurs, rubs, clicks or gallops, distal pulses intact, no carotid bruits  Abdomen: soft, non-tender, non-distended, normal bowel sounds, no masses or organomegaly  Extremities: no cyanosis, clubbing or edema  Musculoskeletal: normal range of motion, no joint swelling, deformity or tenderness  Neurologic: no cranial nerve deficit and muscle strength normal    Lab and Imaging Review     CBC  Recent Labs     10/07/21  1545 10/08/21  0519   WBC 6.1 8.6   HGB 13.4 12.3   HCT 37.7 34.3*   MCV 85.7 84.3    257       BMP  Recent Labs     10/07/21  1545 10/08/21  0519    142   K 3.4* 3.4*   CL 99 105   CO2 24 23   BUN 8 10   CREATININE 0.70 0.68   GLUCOSE 126* 108*   CALCIUM 9.2 8.7       LFTS  Recent Labs     10/07/21  1545   ALKPHOS 47   ALT 11   AST 19   PROT 7.3   BILITOT 0.30   BILIDIR 0.08   LABALBU 4.6       AMYLASE/LIPASE/AMMONIA  Recent Labs     10/07/21  1545   AMYLASE 91   LIPASE 12*       PT/INR  Recent Labs     10/08/21  0519   PROTIME 14.0   INR 1.1       Findings:egd dr Kailey Gregorio 10/9/21     Retropharyngeal area was grossly normal appearing     Esophagus: abnormal: Inflamed GE junction with polypoidal formation   Biopsies were taken  Patient is to be on PPI for 4 to 6 weeks and go back on take more biopsies from the same area           Stomach:  Gastritis  Biopsies taken     Retained liquid indicating some delay in emptying     Duodenum:     Descending: normal    Bulb: normal       ASSESSMENT/plan  1. Abdominal pain with nausea and vomiting, s/p egd yesterday showing gastritis with inflamed GE junction and retained liquid in the stomach  -ppi and reglan  -needs repeat egd as outpt in 4-6 weeks  -f/u on bx results  -diet as tolerated    D/w md gi signing off      This plan was formulated in collaboration with Dr. Kailey Gregorio. Electronically signed by: PRESTON Lopez CNP on 10/10/2021 at 1:09 PM       Attending Physician Statement  I have discussed the care of [address] and   I have examined the patient myselft independently, and taken ros and hpi , including pertinent history and exam findings,  with the author of this note .    I have reviewed the key elements of all parts of the encounter with the nurse practitioner/resident.     I agree with the assessment, plan and orders as documented by the above health care provider     Patient is much improved with Reglan  Tolerated diet well  Okay to discharge on PPI and Reglan and will see her as an outpatient    Electronically signed by Amy Cornell MD

## 2021-10-10 NOTE — PROGRESS NOTES
Per Dr. Ginger Jones via telephone, delay discharge overnight. Will re-evaluate tomorrow. Patient not attempting solid food yet. Only tolerating clears.

## 2021-10-10 NOTE — PROGRESS NOTES
Pt discharged to home in good condition with belongings  Discharge instructions given and signed  Pt denies having any further questions at this time  Locked up home medication(s)/personal items given to patient at discharge  Patient/family state they have everything they were admitted with.

## 2021-10-10 NOTE — PROGRESS NOTES
[facility]  Office: [address], DO, Enrique Cardenas, DO, Gio Julien, DO, Virgie Mcginnis Louann, DO, Kasia Linn MD, Dori Marcano MD, Frida Bernstein MD, Narda Aly MD, Mohit Miller MD, Rafael Clemens MD, Joann Andrade MD, Luigi Daniel, DO, Amy Julian, DO, Ra Fernandez MD,  Shana Staff, DO, Sixto Bullard MD, Douglas Lau MD, Juice Riley MD, Kd Umanzor MD, Vidhya Bernstein MD, Daxa Douglas MD, Echo Rivera, [facility], [facility] [place], CNP, Marcie Cancel, CNP, Priscilla Fret, CNS, Nadiya Huber, CNP, Kaylyn Brain, CNP, Elio Nab, CNP, Elviedilshad Zhengi, CNP, Harrison Ashby, CNP, Julio César Harris PA-C, Camila Garcia, [facility], Terry Barahona, CNP, Monda Tejeda, CNP, Ladonna Maggy, CNP, Stephanie Lien, CNP, Brooks Locus, CNP, Reji Mater, CNP, [facility]      Daily Progress Note     Admit Date: 10/7/2021  Bed/Room No.  2003/2003-02  Admitting Physician : Frida Bernstein MD  Code Status :Full Code  Hospital Day:  LOS: 3 days   Chief Complaint:     Chief Complaint   Patient presents with    Emesis     onset this morning. clear emesis in basin. Principal Problem:    Intractable nausea and vomiting  Active Problems:    GERD (gastroesophageal reflux disease)    Seizure (HCC)    Marijuana use    Moderate persistent asthma without complication    Gastroesophageal reflux disease with esophagitis without hemorrhage  Resolved Problems:    * No resolved hospital problems. *    Subjective : Interval History/Significant events :  10/10/21    Patient reports improvement in abdominal pain. She denies any nausea, vomiting. Rates pain as 1/10. Patient had omelette for breakfast and tolerated well. Vitals - Stable afebrile  Labs -    Nursing notes , Consults notes reviewed. Overnight events and updates discussed with Nursing staff .    Background History:         Marianela Christensen is 46 y.o. female  Who was admitted to the hospital on 10/7/2021 disturbance. Respiratory: Negative for cough, chest tightness, shortness of breath and wheezing. Cardiovascular: Negative for chest pain and palpitations. Gastrointestinal: Negative for abdominal pain, blood in stool, constipation, diarrhea, nausea and vomiting. Genitourinary: Negative for dysuria, enuresis, frequency and hematuria. Musculoskeletal: Negative for arthralgias and myalgias. Skin: Negative for rash. Neurological: Negative for dizziness, weakness, light-headedness and headaches. Hematological: Does not bruise/bleed easily. Psychiatric/Behavioral: Negative for dysphoric mood and sleep disturbance. Objective :      Current Vitals : Temp: 98.1 °F (36.7 °C),  Pulse: 70, Resp: 16, BP: 125/72, SpO2: 99 %  Last 24 Hrs Vitals   Patient Vitals for the past 24 hrs:   BP Temp Temp src Pulse Resp SpO2 Weight   10/10/21 0724 125/72 98.1 °F (36.7 °C) Oral 70 16 99 % --   10/10/21 0420 -- -- -- -- -- -- 116 lb (52.6 kg)   10/10/21 0409 128/63 97.9 °F (36.6 °C) Oral 56 16 97 % --   10/10/21 0022 139/79 97.9 °F (36.6 °C) Oral 51 16 95 % --   10/09/21 1925 (!) 150/79 98 °F (36.7 °C) Oral 66 16 96 % --   10/09/21 1519 135/74 98.2 °F (36.8 °C) Oral 54 18 98 % --   10/09/21 1342 (!) 141/83 98.1 °F (36.7 °C) Oral 60 18 98 % --   10/09/21 1339 137/79 -- -- 61 12 98 % --   10/09/21 1330 (!) 145/88 -- -- 59 14 99 % --   10/09/21 1315 137/86 -- -- 71 26 99 % --   10/09/21 1313 137/86 99.2 °F (37.3 °C) Temporal 92 25 99 % --     Intake / output   10/09 0701 - 10/10 0700  In: 8099 [P.O.:240; I.V.:1137]  Out: 800 [Urine:800]  Physical Exam:  Physical Exam  Vitals and nursing note reviewed. Constitutional:       General: She is not in acute distress. Appearance: She is not diaphoretic. HENT:      Head: Normocephalic and atraumatic. Nose:      Right Sinus: No maxillary sinus tenderness or frontal sinus tenderness. Left Sinus: No maxillary sinus tenderness or frontal sinus tenderness. Mouth/Throat:      Pharynx: No oropharyngeal exudate. Eyes:      General: No scleral icterus. Conjunctiva/sclera: Conjunctivae normal.      Pupils: Pupils are equal, round, and reactive to light. Neck:      Thyroid: No thyromegaly. Vascular: No JVD. Cardiovascular:      Rate and Rhythm: Normal rate and regular rhythm. Pulses:           Dorsalis pedis pulses are 2+ on the right side and 2+ on the left side. Heart sounds: Normal heart sounds. No murmur heard. Pulmonary:      Effort: Pulmonary effort is normal.      Breath sounds: Normal breath sounds. No wheezing or rales. Abdominal:      Palpations: Abdomen is soft. There is no mass. Tenderness: There is no abdominal tenderness. Musculoskeletal:      Cervical back: Full passive range of motion without pain and neck supple. Lymphadenopathy:      Head:      Right side of head: No submandibular adenopathy. Left side of head: No submandibular adenopathy. Cervical: No cervical adenopathy. Skin:     General: Skin is warm. Neurological:      Mental Status: She is alert and oriented to person, place, and time. Motor: No tremor. Psychiatric:         Behavior: Behavior is cooperative.            Laboratory findings:    Recent Labs     10/07/21  1545 10/08/21  0519   WBC 6.1 8.6   HGB 13.4 12.3   HCT 37.7 34.3*    257   INR  --  1.1     Recent Labs     10/07/21  1545 10/08/21  0519    142   K 3.4* 3.4*   CL 99 105   CO2 24 23   GLUCOSE 126* 108*   BUN 8 10   CREATININE 0.70 0.68   MG  --  1.9   CALCIUM 9.2 8.7     Recent Labs     10/07/21  1545   PROT 7.3   LABALBU 4.6   AST 19   ALT 11   ALKPHOS 47   BILITOT 0.30   BILIDIR 0.08   AMYLASE 91   LIPASE 12*          Specific Gravity, UA   Date Value Ref Range Status   09/13/2021 1.025 1.005 - 1.030 Final     Protein, UA   Date Value Ref Range Status   09/13/2021 1+ (A) NEGATIVE Final     RBC, UA   Date Value Ref Range Status   09/13/2021 0 TO 2 0 - 2 /HPF Final     Bacteria, UA   Date Value Ref Range Status   09/13/2021 FEW (A) None Final     Nitrite, Urine   Date Value Ref Range Status   09/13/2021 NEGATIVE NEGATIVE Final     WBC, UA   Date Value Ref Range Status   09/13/2021 10 TO 20 0 - 5 /HPF Final     Leukocyte Esterase, Urine   Date Value Ref Range Status   09/13/2021 NEGATIVE NEGATIVE Final       Imaging / Clinical Data :-   No results found. Clinical Course : Unchanged    Assessment and Plan  :        Intractable nausea vomiting likely secondary to marijuana use and gastritis-  ultrasound gallbladder negative for GB disease. PPI for gastritis and follow-up with GI in 4 weeks to schedule repeat EGD in 4 to 6 weeks. Marijuana use -recommend complete abstinence. Moderate persistent asthma-albuterol as needed, add Pulmicort. Seizure disorder-continue Keppra. Discharge home    Plan and updates discussed with patient ,  answers  explained to satisfaction.    Plan discussed with Staff Faiza Coto and Jame Serrato RN     (Please note that portions of this note were completed with a voice recognition program. Efforts were made to edit the dictations but occasionally words are mis-transcribed.)      Martin Ugarte MD  10/10/2021

## 2021-10-11 ENCOUNTER — TELEPHONE (OUTPATIENT)
Dept: INTERNAL MEDICINE | Age: 41
End: 2021-10-11

## 2021-10-11 NOTE — TELEPHONE ENCOUNTER
[facility] Initial Follow Up Call    Outreach made within 2 business days of discharge: Yes    Patient: Sharon Vyas   Patient : 1980   MRN: [id number]    Reason for Admission: intractable nausea/vomiting  Discharge Date: 10/10/2021       Spoke with: No answer    Discharge department/facility: [facility]

## 2021-10-12 LAB — SURGICAL PATHOLOGY REPORT: NORMAL

## 2021-10-12 NOTE — TELEPHONE ENCOUNTER
[facility] Initial Follow Up Call     Outreach made within 2 business days of discharge: Yes     Patient: Cinthia Valdivia      Patient : 1980   MRN: [id number]           Reason for Admission: intractable nausea/vomiting  Discharge Date: 10/10/2021                  Spoke with: William Michelle     Discharge department/facility: [facility]    TCM Interactive Patient Contact:  Was patient able to fill all prescriptions: Yes  Was patient instructed to bring all medications to the follow-up visit: Yes  Is patient taking all medications as directed in the discharge summary? Yes  Does patient understand their discharge instructions: Yes  Does patient have questions or concerns that need addressed prior to 7-14 day follow up office visit: no    Scheduled appointment with PCP within 7-14 days    Patient had appt scheduled for 10/19, changed to hospital follow up. Patient states she is feeling much better, no more n/v, able to keep food down.  Pt reminded of date and time for appt on 10/19    Follow Up  Future Appointments   Date Time Provider Rachel Adkins   10/19/2021 10:50 AM Lalit Barajas MD [facility] Mitch Morales RN

## 2021-10-18 RX ORDER — AMOXICILLIN AND CLAVULANATE POTASSIUM 875; 125 MG/1; MG/1
1 TABLET, FILM COATED ORAL 2 TIMES DAILY
COMMUNITY
Start: 2021-01-22 | End: 2021-10-19

## 2021-10-18 RX ORDER — ALBUTEROL SULFATE 90 UG/1
AEROSOL, METERED RESPIRATORY (INHALATION)
COMMUNITY
Start: 2021-01-07 | End: 2022-01-25 | Stop reason: SDUPTHER

## 2021-10-18 RX ORDER — FAMOTIDINE 20 MG/1
TABLET, FILM COATED ORAL
COMMUNITY
Start: 2020-12-04 | End: 2021-10-19

## 2021-10-19 ENCOUNTER — OFFICE VISIT (OUTPATIENT)
Dept: INTERNAL MEDICINE | Age: 41
End: 2021-10-19
Payer: COMMERCIAL

## 2021-10-19 VITALS
WEIGHT: 111 LBS | SYSTOLIC BLOOD PRESSURE: 103 MMHG | HEIGHT: 62 IN | DIASTOLIC BLOOD PRESSURE: 76 MMHG | BODY MASS INDEX: 20.43 KG/M2 | HEART RATE: 96 BPM

## 2021-10-19 DIAGNOSIS — Z23 NEED FOR INFLUENZA VACCINATION: ICD-10-CM

## 2021-10-19 DIAGNOSIS — K29.50 CHRONIC GASTRITIS WITHOUT BLEEDING, UNSPECIFIED GASTRITIS TYPE: ICD-10-CM

## 2021-10-19 DIAGNOSIS — E87.6 HYPOKALEMIA: ICD-10-CM

## 2021-10-19 DIAGNOSIS — R11.2 INTRACTABLE NAUSEA AND VOMITING: Primary | ICD-10-CM

## 2021-10-19 DIAGNOSIS — F12.90 MARIJUANA USE: ICD-10-CM

## 2021-10-19 PROCEDURE — 99213 OFFICE O/P EST LOW 20 MIN: CPT | Performed by: STUDENT IN AN ORGANIZED HEALTH CARE EDUCATION/TRAINING PROGRAM

## 2021-10-19 PROCEDURE — 90686 IIV4 VACC NO PRSV 0.5 ML IM: CPT | Performed by: INTERNAL MEDICINE

## 2021-10-19 PROCEDURE — 1111F DSCHRG MED/CURRENT MED MERGE: CPT | Performed by: STUDENT IN AN ORGANIZED HEALTH CARE EDUCATION/TRAINING PROGRAM

## 2021-10-19 PROCEDURE — 99211 OFF/OP EST MAY X REQ PHY/QHP: CPT | Performed by: INTERNAL MEDICINE

## 2021-10-19 RX ORDER — POTASSIUM CHLORIDE 20 MEQ/1
20 TABLET, EXTENDED RELEASE ORAL DAILY
Qty: 30 TABLET | Refills: 1 | Status: SHIPPED | OUTPATIENT
Start: 2021-10-19 | End: 2022-01-25 | Stop reason: SDUPTHER

## 2021-10-19 SDOH — ECONOMIC STABILITY: FOOD INSECURITY: WITHIN THE PAST 12 MONTHS, YOU WORRIED THAT YOUR FOOD WOULD RUN OUT BEFORE YOU GOT MONEY TO BUY MORE.: NEVER TRUE

## 2021-10-19 SDOH — ECONOMIC STABILITY: FOOD INSECURITY: WITHIN THE PAST 12 MONTHS, THE FOOD YOU BOUGHT JUST DIDN'T LAST AND YOU DIDN'T HAVE MONEY TO GET MORE.: NEVER TRUE

## 2021-10-19 ASSESSMENT — PATIENT HEALTH QUESTIONNAIRE - PHQ9
SUM OF ALL RESPONSES TO PHQ QUESTIONS 1-9: 0
SUM OF ALL RESPONSES TO PHQ9 QUESTIONS 1 & 2: 0
1. LITTLE INTEREST OR PLEASURE IN DOING THINGS: 0
SUM OF ALL RESPONSES TO PHQ QUESTIONS 1-9: 0
2. FEELING DOWN, DEPRESSED OR HOPELESS: 0
SUM OF ALL RESPONSES TO PHQ QUESTIONS 1-9: 0

## 2021-10-19 ASSESSMENT — ENCOUNTER SYMPTOMS
RESPIRATORY NEGATIVE: 1
GASTROINTESTINAL NEGATIVE: 1

## 2021-10-19 ASSESSMENT — SOCIAL DETERMINANTS OF HEALTH (SDOH): HOW HARD IS IT FOR YOU TO PAY FOR THE VERY BASICS LIKE FOOD, HOUSING, MEDICAL CARE, AND HEATING?: NOT HARD AT ALL

## 2021-10-19 NOTE — PATIENT INSTRUCTIONS
Follow-up appointment scheduled for 1/25/22 at 10:30a, AVS given to patient. Medications e-scribe to pharmacy of pt's choice. Labs given to patient, they will have them done before their next visit.      jw

## 2021-10-19 NOTE — PROGRESS NOTES
Patient patient is here for post hospital follow-up. She was admitted with intractable nausea and vomiting. Possible she has cyclical vomiting syndrome secondary to chronic marijuana use. EGD was done this time as she has had several ER visits for the same issue. She was noted to have gastritis. Biopsy negative for H. pylori. She is supposed to follow-up with GI in 6 weeks she was placed on Reglan and PPI on discharge. She has chronic hypokalemia. Magnesium is normal.  Probably related to her emesis. She denies alcohol use. Ultrasound gallbladder and pancreas normal.  Low BMI. Patient advised increasing hydration and potassium supplementation and nutrition. Avoid marijuana. Follow-up with GI. Attending Physician Statement  I have discussed the care of Martha Pedroza, including pertinent history and exam findings,  with the resident. I have reviewed the key elements of all parts of the encounter with the resident. I agree with the assessment, plan and orders as documented by the resident. (GE Modifier)  .

## 2021-10-19 NOTE — PROGRESS NOTES
Post-Discharge Transitional Care Management Services or Hospital Follow Up      [address]   YOB: 1980    Date of Office Visit:  10/19/2021  Date of Hospital Admission: 10/7/21  Date of Hospital Discharge: 10/10/21  Readmission Risk Score(high >=14%.  Medium >=10%):Readmission Risk Score: 13      Care management risk score Rising risk (score 2-5) and Complex Care (Scores >=6): 2     Non face to face  following discharge, date last encounter closed (first attempt may have been earlier): 10/11/2021  3:59 PM 10/11/2021  3:59 PM    Call initiated 2 business days of discharge: Yes     Patient Active Problem List   Diagnosis    GERD (gastroesophageal reflux disease)    Mental retardation    Seizure ([facility] [address]    Irritable bowel syndrome without diarrhea    Intractable nausea and vomiting    Marijuana use    Moderate persistent asthma without complication    Gastroesophageal reflux disease with esophagitis without hemorrhage    Intractable vomiting       Allergies   Allergen Reactions    Omeprazole      Pt had a seizure       Medications listed as ordered at the time of discharge from hospital   [facility] Medication Instructions KEK:[id number]    Printed on:10/19/21 1147   Medication Information                      albuterol sulfate  (90 Base) MCG/ACT inhaler  INHALE TWO PUFFS BY MOUTH EVERY 6 HOURS AS NEEDED FOR WHEEZING             calcium carbonate (ANTACID) 500 MG chewable tablet  Take 1 tablet by mouth daily             levETIRAcetam (KEPPRA) 750 MG tablet  TAKE ONE TABLET BY MOUTH TWICE A DAY             metoclopramide (REGLAN) 5 MG tablet  Take 1 tablet by mouth 3 times daily (with meals)             ondansetron (ZOFRAN ODT) 4 MG disintegrating tablet  Take 1 tablet by mouth every 8 hours as needed for Nausea             ondansetron (ZOFRAN ODT) 4 MG disintegrating tablet  Take 1 tablet by mouth every 8 hours as needed for Nausea             pantoprazole (PROTONIX) 40 MG tablet  Take 1 tablet by mouth every morning (before breakfast)             potassium chloride (KLOR-CON M) 20 MEQ extended release tablet  Take 1 tablet by mouth daily for 5 days             vitamin D3 (CHOLECALCIFEROL) 25 MCG (1000 UT) TABS tablet  ONE BY MOUTH DAILY                   Medications marked \"taking\" at this time  Outpatient Medications Marked as Taking for the 10/19/21 encounter (Office Visit) with Padmini Tinoco MD   Medication Sig Dispense Refill    pantoprazole (PROTONIX) 40 MG tablet Take 1 tablet by mouth every morning (before breakfast) 30 tablet 1    metoclopramide (REGLAN) 5 MG tablet Take 1 tablet by mouth 3 times daily (with meals) 90 tablet 0    ondansetron (ZOFRAN ODT) 4 MG disintegrating tablet Take 1 tablet by mouth every 8 hours as needed for Nausea 20 tablet 0    levETIRAcetam (KEPPRA) 750 MG tablet TAKE ONE TABLET BY MOUTH TWICE A DAY 60 tablet 3    vitamin D3 (CHOLECALCIFEROL) 25 MCG (1000 UT) TABS tablet ONE BY MOUTH DAILY 30 tablet 3        Medications patient taking as of now reconciled against medications ordered at time of hospital discharge: Yes    Chief Complaint   Patient presents with    Follow-Up from [place] [address] 10/7-10/10 Intactable nausea and vomiting    Health Maintenance     agrees to flu vaccine, lab pended, pt would like to schedule pap with PCP       HPI    Inpatient course: Discharge summary reviewed- see chart. Patient was recently hospitalized from 10/7/2021 to 10/9/2021 for nausea and vomiting since March associated with heartburn. Associated with mild abdominal bloating and cramps. 1-2 bowel movements semiformed stools. She was discharged on Reglan and Protonix. She did complain of loss of appetite and loss of weight but was unable to quantify. She was seen by gastroenterology and underwent EGD which revealed gastritis otherwise normal EGD.     She has past medical history of seizure disorder, asthma and GERD.    Interval history/Current status: Patient mentioned that her symptoms have resolved since her discharge from the hospital does not have any more nausea or vomiting. Patient to follow-up with gastroenterology with repeat EGD in 4 to 6 weeks. She mentioned that she consumes marijuana. Denies alcohol consumption or smoking cigarettes. Review of Systems   Constitutional: Negative. HENT: Negative. Respiratory: Negative. Cardiovascular: Negative. Gastrointestinal: Negative. Genitourinary: Negative. Musculoskeletal: Negative. Neurological: Negative. Vitals:    10/19/21 1057 10/19/21 1105   BP: 99/69 103/76   Site: Left Upper Arm Left Upper Arm   Position: Sitting Sitting   Cuff Size: Medium Adult Medium Adult   Pulse: 92 96   Weight: 111 lb (50.3 kg)    Height: 5' 2\" (1.575 m)      Body mass index is 20.3 kg/m². Wt Readings from Last 3 Encounters:   10/19/21 111 lb (50.3 kg)   10/10/21 116 lb (52.6 kg)   09/13/21 108 lb 8 oz (49.2 kg)     BP Readings from Last 3 Encounters:   10/19/21 103/76   10/10/21 125/72   10/09/21 (!) 141/80       Physical Exam  Constitutional:       Appearance: Normal appearance. HENT:      Head: Normocephalic. Eyes:      Conjunctiva/sclera: Conjunctivae normal.   Cardiovascular:      Rate and Rhythm: Normal rate and regular rhythm. Pulses: Normal pulses. Heart sounds: Normal heart sounds. Pulmonary:      Effort: Pulmonary effort is normal.      Breath sounds: Normal breath sounds. Abdominal:      General: Abdomen is flat. Bowel sounds are normal.      Palpations: Abdomen is soft. Neurological:      General: No focal deficit present. Mental Status: She is alert. Assessment/Plan:  1. Need for influenza vaccination    - INFLUENZA, QUADV, 3 YRS AND OLDER, IM PF, PREFILL SYR OR SDV, 0.5ML (AFLURIA QUADV, PF)  - UT IMMUNIZ ADMIN,1 SINGLE/COMB VAC/TOXOID  - UT DISCHARGE MEDS RECONCILED W/ CURRENT OUTPATIENT MED LIST    2. Chronic gastritis without bleeding, unspecified gastritis type     Patient is on Protonix 40 mg daily for 4 to 6 weeks  Counseled on avoiding high-fat, oily or spicy food. Recommended to continue to refrain from alcohol/NSAID use    3. Intractable nausea and vomiting    Likely secondary to marijuana use and chronic gastritis. On Reglan 5 mg 3 times daily    4. Marijuana use    Counseled on strict abstinence    5. Hypokalemia:  Secondary to intractable nausea and vomiting.   Follow-up BMP will start on potassium pills 20 mEq daily        Medical Decision Making: donna Stover MD  PGY-3, Internal Medicine Resident  [facility]  10/19/2021 11:50 AM

## 2021-11-05 ENCOUNTER — HOSPITAL ENCOUNTER (OUTPATIENT)
Age: 41
Discharge: HOME | End: 2021-11-05
Payer: COMMERCIAL

## 2021-11-05 DIAGNOSIS — E87.6 HYPOKALEMIA: ICD-10-CM

## 2021-11-05 LAB
ANION GAP SERPL CALCULATED.3IONS-SCNC: 12 MMOL/L (ref 9–17)
BUN BLDV-MCNC: 7 MG/DL (ref 6–20)
BUN/CREAT BLD: NORMAL (ref 9–20)
CALCIUM SERPL-MCNC: 8.9 MG/DL (ref 8.6–10.4)
CHLORIDE BLD-SCNC: 104 MMOL/L (ref 98–107)
CO2: 23 MMOL/L (ref 20–31)
CREAT SERPL-MCNC: 0.76 MG/DL (ref 0.5–0.9)
GFR AFRICAN AMERICAN: >60 ML/MIN
GFR NON-AFRICAN AMERICAN: >60 ML/MIN
GFR SERPL CREATININE-BSD FRML MDRD: NORMAL ML/MIN/{1.73_M2}
GFR SERPL CREATININE-BSD FRML MDRD: NORMAL ML/MIN/{1.73_M2}
GLUCOSE BLD-MCNC: 91 MG/DL (ref 70–99)
POTASSIUM SERPL-SCNC: 4.4 MMOL/L (ref 3.7–5.3)
SODIUM BLD-SCNC: 139 MMOL/L (ref 135–144)

## 2021-11-05 PROCEDURE — 36415 COLL VENOUS BLD VENIPUNCTURE: CPT

## 2021-11-05 PROCEDURE — 80048 BASIC METABOLIC PNL TOTAL CA: CPT

## 2022-01-08 ENCOUNTER — HOSPITAL ENCOUNTER (EMERGENCY)
Age: 42
Discharge: HOME | End: 2022-01-08
Attending: EMERGENCY MEDICINE
Payer: COMMERCIAL

## 2022-01-08 ENCOUNTER — APPOINTMENT (OUTPATIENT)
Dept: GENERAL RADIOLOGY | Age: 42
End: 2022-01-08
Payer: COMMERCIAL

## 2022-01-08 VITALS
WEIGHT: 108 LBS | SYSTOLIC BLOOD PRESSURE: 140 MMHG | HEART RATE: 55 BPM | OXYGEN SATURATION: 98 % | RESPIRATION RATE: 14 BRPM | HEIGHT: 67 IN | TEMPERATURE: 97.7 F | BODY MASS INDEX: 16.95 KG/M2 | DIASTOLIC BLOOD PRESSURE: 84 MMHG

## 2022-01-08 DIAGNOSIS — R11.2 NAUSEA VOMITING AND DIARRHEA: Primary | ICD-10-CM

## 2022-01-08 DIAGNOSIS — R19.7 NAUSEA VOMITING AND DIARRHEA: Primary | ICD-10-CM

## 2022-01-08 LAB
ABSOLUTE EOS #: 0 K/UL (ref 0–0.44)
ABSOLUTE IMMATURE GRANULOCYTE: 0 K/UL (ref 0–0.3)
ABSOLUTE LYMPH #: 0.76 K/UL (ref 1.1–3.7)
ABSOLUTE MONO #: 0.3 K/UL (ref 0.1–1.2)
ALBUMIN SERPL-MCNC: 5.4 G/DL (ref 3.5–5.2)
ALBUMIN/GLOBULIN RATIO: ABNORMAL (ref 1–2.5)
ALP BLD-CCNC: 52 U/L (ref 35–104)
ALT SERPL-CCNC: 23 U/L (ref 5–33)
AMYLASE: 60 U/L (ref 28–100)
ANION GAP SERPL CALCULATED.3IONS-SCNC: 18 MMOL/L (ref 9–17)
AST SERPL-CCNC: 29 U/L
BASOPHILS # BLD: 0 % (ref 0–2)
BASOPHILS ABSOLUTE: 0 K/UL (ref 0–0.2)
BILIRUB SERPL-MCNC: 0.48 MG/DL (ref 0.3–1.2)
BUN BLDV-MCNC: 19 MG/DL (ref 6–20)
BUN/CREAT BLD: 25 (ref 9–20)
CALCIUM SERPL-MCNC: 10.3 MG/DL (ref 8.6–10.4)
CHLORIDE BLD-SCNC: 99 MMOL/L (ref 98–107)
CO2: 24 MMOL/L (ref 20–31)
CREAT SERPL-MCNC: 0.77 MG/DL (ref 0.5–0.9)
DIFFERENTIAL TYPE: ABNORMAL
EOSINOPHILS RELATIVE PERCENT: 0 % (ref 1–4)
GFR AFRICAN AMERICAN: >60 ML/MIN
GFR NON-AFRICAN AMERICAN: >60 ML/MIN
GFR SERPL CREATININE-BSD FRML MDRD: ABNORMAL ML/MIN/{1.73_M2}
GFR SERPL CREATININE-BSD FRML MDRD: ABNORMAL ML/MIN/{1.73_M2}
GLUCOSE BLD-MCNC: 151 MG/DL (ref 70–99)
HCG QUALITATIVE: NEGATIVE
HCT VFR BLD CALC: 39.5 % (ref 36.3–47.1)
HEMOGLOBIN: 14.4 G/DL (ref 11.9–15.1)
IMMATURE GRANULOCYTES: 0 %
LIPASE: 10 U/L (ref 13–60)
LYMPHOCYTES # BLD: 5 % (ref 24–43)
MCH RBC QN AUTO: 31.1 PG (ref 25.2–33.5)
MCHC RBC AUTO-ENTMCNC: 36.5 G/DL (ref 28.4–34.8)
MCV RBC AUTO: 85.3 FL (ref 82.6–102.9)
MONOCYTES # BLD: 2 % (ref 3–12)
NRBC AUTOMATED: 0 PER 100 WBC
PDW BLD-RTO: 11.9 % (ref 11.8–14.4)
PLATELET # BLD: 426 K/UL (ref 138–453)
PLATELET ESTIMATE: ABNORMAL
PMV BLD AUTO: 9.5 FL (ref 8.1–13.5)
POTASSIUM SERPL-SCNC: 3.7 MMOL/L (ref 3.7–5.3)
RBC # BLD: 4.63 M/UL (ref 3.95–5.11)
RBC # BLD: ABNORMAL 10*6/UL
SEG NEUTROPHILS: 93 % (ref 36–65)
SEGMENTED NEUTROPHILS ABSOLUTE COUNT: 14.04 K/UL (ref 1.5–8.1)
SODIUM BLD-SCNC: 141 MMOL/L (ref 135–144)
TOTAL PROTEIN: 8.2 G/DL (ref 6.4–8.3)
WBC # BLD: 15.1 K/UL (ref 3.5–11.3)
WBC # BLD: ABNORMAL 10*3/UL

## 2022-01-08 PROCEDURE — 74022 RADEX COMPL AQT ABD SERIES: CPT

## 2022-01-08 PROCEDURE — 82150 ASSAY OF AMYLASE: CPT

## 2022-01-08 PROCEDURE — 83690 ASSAY OF LIPASE: CPT

## 2022-01-08 PROCEDURE — 85025 COMPLETE CBC W/AUTO DIFF WBC: CPT

## 2022-01-08 PROCEDURE — 84703 CHORIONIC GONADOTROPIN ASSAY: CPT

## 2022-01-08 PROCEDURE — 2500000003 HC RX 250 WO HCPCS: Performed by: EMERGENCY MEDICINE

## 2022-01-08 PROCEDURE — 96374 THER/PROPH/DIAG INJ IV PUSH: CPT

## 2022-01-08 PROCEDURE — 2580000003 HC RX 258: Performed by: EMERGENCY MEDICINE

## 2022-01-08 PROCEDURE — 96372 THER/PROPH/DIAG INJ SC/IM: CPT

## 2022-01-08 PROCEDURE — 99285 EMERGENCY DEPT VISIT HI MDM: CPT

## 2022-01-08 PROCEDURE — 80053 COMPREHEN METABOLIC PANEL: CPT

## 2022-01-08 PROCEDURE — 96375 TX/PRO/DX INJ NEW DRUG ADDON: CPT

## 2022-01-08 PROCEDURE — 6360000002 HC RX W HCPCS: Performed by: EMERGENCY MEDICINE

## 2022-01-08 RX ORDER — 0.9 % SODIUM CHLORIDE 0.9 %
1000 INTRAVENOUS SOLUTION INTRAVENOUS ONCE
Status: COMPLETED | OUTPATIENT
Start: 2022-01-08 | End: 2022-01-08

## 2022-01-08 RX ORDER — DICYCLOMINE HCL 20 MG
20 TABLET ORAL EVERY 6 HOURS
Qty: 20 TABLET | Refills: 0 | Status: SHIPPED | OUTPATIENT
Start: 2022-01-08 | End: 2022-01-25 | Stop reason: SDUPTHER

## 2022-01-08 RX ORDER — FAMOTIDINE 20 MG/1
20 TABLET, FILM COATED ORAL 2 TIMES DAILY
Qty: 20 TABLET | Refills: 0 | Status: SHIPPED | OUTPATIENT
Start: 2022-01-08

## 2022-01-08 RX ORDER — ONDANSETRON 4 MG/1
4 TABLET, ORALLY DISINTEGRATING ORAL 3 TIMES DAILY PRN
Qty: 21 TABLET | Refills: 0 | Status: SHIPPED | OUTPATIENT
Start: 2022-01-08 | End: 2022-01-25 | Stop reason: SDUPTHER

## 2022-01-08 RX ORDER — ONDANSETRON 2 MG/ML
4 INJECTION INTRAMUSCULAR; INTRAVENOUS ONCE
Status: COMPLETED | OUTPATIENT
Start: 2022-01-08 | End: 2022-01-08

## 2022-01-08 RX ORDER — PROMETHAZINE HYDROCHLORIDE 25 MG/ML
6.25 INJECTION, SOLUTION INTRAMUSCULAR; INTRAVENOUS ONCE
Status: COMPLETED | OUTPATIENT
Start: 2022-01-08 | End: 2022-01-08

## 2022-01-08 RX ADMIN — PROMETHAZINE HYDROCHLORIDE 6.25 MG: 25 INJECTION INTRAMUSCULAR; INTRAVENOUS at 14:24

## 2022-01-08 RX ADMIN — ONDANSETRON 4 MG: 2 INJECTION INTRAMUSCULAR; INTRAVENOUS at 14:21

## 2022-01-08 RX ADMIN — SODIUM CHLORIDE 1000 ML: 9 INJECTION, SOLUTION INTRAVENOUS at 14:15

## 2022-01-08 RX ADMIN — FAMOTIDINE 20 MG: 10 INJECTION, SOLUTION INTRAVENOUS at 14:21

## 2022-01-08 ASSESSMENT — ENCOUNTER SYMPTOMS
VOMITING: 1
EYE DISCHARGE: 0
BACK PAIN: 0
ABDOMINAL PAIN: 1
FACIAL SWELLING: 0
NAUSEA: 1
SHORTNESS OF BREATH: 0
EYE PAIN: 0
ABDOMINAL DISTENTION: 0
CHEST TIGHTNESS: 0

## 2022-01-08 ASSESSMENT — PAIN DESCRIPTION - LOCATION: LOCATION: ABDOMEN

## 2022-01-08 ASSESSMENT — PAIN DESCRIPTION - PAIN TYPE: TYPE: ACUTE PAIN

## 2022-01-08 ASSESSMENT — PAIN SCALES - GENERAL: PAINLEVEL_OUTOF10: 10

## 2022-01-08 NOTE — ED PROVIDER NOTES
EMERGENCY DEPARTMENT ENCOUNTER    Pt Name: Carlos Go  MRN: [id number]  [place] 1980  Date of evaluation: 1/8/22  CHIEF COMPLAINT       Chief Complaint   Patient presents with    Nausea & Vomiting     since wednesday     Diarrhea     HISTORY OF PRESENT ILLNESS   HPI   The patient is a 82-year-old female who presented to the emergency department from home secondary to nausea, vomiting and diarrhea. Patient complains of a 4-day history of nausea and vomiting. She complains of eight or nine episodes per day of nonbilious nonbloody vomitus. She becomes nauseous and vomits immediately after she has something to eat. She also had several episodes of watery nonbloody diarrhea. Several family was have similar symptomology. She denied recent travel no antibiotic use. She has not eaten in a restaurant prior to symptoms onset. She is complaining of pain localized to the epigastric region 10 out of 10 on the pain scale. She denied a previous history of pancreatitis, diverticulitis or gallstones. She denied hematuria or dysuria. Is unsure when her last menstrual period was. Denies vaginal bleeding or vaginal discharge. Patient denied chest pain, shortness of breath, fevers or chills. REVIEW OF SYSTEMS     Review of Systems   Constitutional: Negative for chills, diaphoresis and fever. HENT: Negative for congestion, ear pain and facial swelling. Eyes: Negative for pain, discharge and visual disturbance. Respiratory: Negative for chest tightness and shortness of breath. Cardiovascular: Negative for chest pain and palpitations. Gastrointestinal: Positive for abdominal pain, nausea and vomiting. Negative for abdominal distention. Genitourinary: Negative for difficulty urinating and flank pain. Musculoskeletal: Negative for back pain. Skin: Negative for wound. Neurological: Negative for dizziness, light-headedness and headaches.      PASTMEDICAL HISTORY     Past Medical History:   Diagnosis Date    GERD (gastroesophageal reflux disease)     Irritable bowel syndrome without diarrhea 2016    Moderate persistent asthma without complication     Moderate persistent asthma without complication     Seizures ([facility] [address]     2013, 14     SURGICAL HISTORY       Past Surgical History:   Procedure Laterality Date     SECTION          TUBAL LIGATION          UPPER GASTROINTESTINAL ENDOSCOPY N/A 10/9/2021    EGD BIOPSY performed by Cely David MD at [address] [facility]       Previous Medications    ALBUTEROL SULFATE  (90 BASE) MCG/ACT INHALER    INHALE TWO PUFFS BY MOUTH EVERY 6 HOURS AS NEEDED FOR WHEEZING    LEVETIRACETAM (KEPPRA) 750 MG TABLET    TAKE ONE TABLET BY MOUTH TWICE A DAY    METOCLOPRAMIDE (REGLAN) 5 MG TABLET    Take 1 tablet by mouth 3 times daily (with meals)    ONDANSETRON (ZOFRAN ODT) 4 MG DISINTEGRATING TABLET    Take 1 tablet by mouth every 8 hours as needed for Nausea    PANTOPRAZOLE (PROTONIX) 40 MG TABLET    Take 1 tablet by mouth every morning (before breakfast)    POTASSIUM CHLORIDE (KLOR-CON M) 20 MEQ EXTENDED RELEASE TABLET    Take 1 tablet by mouth daily    VITAMIN D3 (CHOLECALCIFEROL) 25 MCG (1000 UT) TABS TABLET    ONE BY MOUTH DAILY     ALLERGIES     is allergic to omeprazole. FAMILY HISTORY     She indicated that her mother is alive. She indicated that her father is alive. She indicated that the status of her neg hx is unknown.      SOCIAL HISTORY       Social History     Tobacco Use    Smoking status: Former Smoker     Packs/day: 1.00     Years: 10.00     Pack years: 10.00     Types: Cigarettes     Start date: 10/19/2003     Quit date: 3/7/2014     Years since quittin.8    Smokeless tobacco: Never Used    Tobacco comment: denies current use    Substance Use Topics    Alcohol use: No    Drug use: Yes     Types: Marijuana (Weed)     Comment: used 10-15 years ago     PHYSICAL EXAM     INITIAL VITALS: BP (!) 144/72   Pulse 55   Temp 97.7 °F (36.5 °C)   Resp 14   Ht 5' 7\" (1.702 m)   Wt 108 lb (49 kg)   LMP 01/08/2022   SpO2 98%   BMI 16.92 kg/m²    Physical Exam  Vitals and nursing note reviewed. Constitutional:       General: She is not in acute distress. Appearance: She is well-developed. She is not diaphoretic. HENT:      Head: Normocephalic and atraumatic. Eyes:      Pupils: Pupils are equal, round, and reactive to light. Cardiovascular:      Rate and Rhythm: Normal rate and regular rhythm. Pulmonary:      Effort: Pulmonary effort is normal.      Breath sounds: Normal breath sounds. Abdominal:      General: Bowel sounds are normal.      Palpations: Abdomen is soft. Musculoskeletal:         General: Normal range of motion. Cervical back: Normal range of motion and neck supple. Skin:     General: Skin is warm. Capillary Refill: Capillary refill takes less than 2 seconds. Neurological:      Mental Status: She is alert and oriented to person, place, and time. MEDICAL DECISION MAKING:   The patient is a 72-year-old female who presented to the emergency department secondary to abdominal pain, nausea and vomiting. Orders for labs, IV fluids antiemetics and acute abdominal series. Patient will be reevaluated. No acute findings on laboratory analysis or imaging. Acute abdominal series no acute findings. Patient was able to tolerate oral without difficulty. Her nausea had resolved. Given this patient was deemed stable for discharge, discharged home with a prescription for Zofran Pepcid and Bentyl, given outpatient follow-up and parameters to return to the emergency department. All patient's question's and concerns were answered prior to disposition and patient and/or family expressed understanding and agreement of treatment plan.         CRITICAL CARE:              NIH STROKE SCALE:            PROCEDURES:    Procedures    DIAGNOSTIC RESULTS   EKG:All EKG's are interpreted by the Emergency Department Physician who either signs or Co-signs this chart in the absence of a cardiologist.        RADIOLOGY:All plain film, CT, MRI, and formal ultrasound images (except ED bedside ultrasound) are read by the radiologist, see reports below, unless otherwisenoted in MDM or here. XR ACUTE ABD SERIES CHEST 1 VW   Final Result   1. Indeterminate paucity of bowel gas with no findings to suggest obstruction. 2. Possible rugal fold thickening in the partially distended stomach is most   likely artifact related to lack of distention, although other etiologies   including gastritis could result in a similar appearance. 3. No acute findings in the chest.           LABS: All lab results were reviewed by myself, and all abnormals are listed below.   Labs Reviewed   CBC WITH AUTO DIFFERENTIAL - Abnormal; Notable for the following components:       Result Value    WBC 15.1 (*)     MCHC 36.5 (*)     Seg Neutrophils 93 (*)     Lymphocytes 5 (*)     Monocytes 2 (*)     Eosinophils % 0 (*)     Segs Absolute 14.04 (*)     Absolute Lymph # 0.76 (*)     All other components within normal limits   COMPREHENSIVE METABOLIC PANEL W/ REFLEX TO MG FOR LOW K - Abnormal; Notable for the following components:    Glucose 151 (*)     Bun/Cre Ratio 25 (*)     Anion Gap 18 (*)     Albumin 5.4 (*)     All other components within normal limits   LIPASE - Abnormal; Notable for the following components:    Lipase 10 (*)     All other components within normal limits   AMYLASE   HCG, SERUM, QUALITATIVE       EMERGENCY DEPARTMENTCOURSE:         Vitals:    Vitals:    01/08/22 1310 01/08/22 1311 01/08/22 1431   BP: (!) 158/72  (!) 144/72   Pulse: (!) 45  55   Resp: 16  14   Temp:  97.7 °F (36.5 °C)    SpO2: 98%  98%   Weight: 108 lb (49 kg)     Height: 5' 7\" (1.702 m)         The patient was given the following medications while in the emergency department:  Orders Placed This Encounter   Medications    0.9 % sodium chloride bolus    ondansetron [facility] injection 4 mg    famotidine (PEPCID) injection 20 mg    promethazine (PHENERGAN) injection 6.25 mg    ondansetron (ZOFRAN-ODT) 4 MG disintegrating tablet     Sig: Take 1 tablet by mouth 3 times daily as needed for Nausea or Vomiting     Dispense:  21 tablet     Refill:  0    famotidine (PEPCID) 20 MG tablet     Sig: Take 1 tablet by mouth 2 times daily     Dispense:  20 tablet     Refill:  0    dicyclomine (BENTYL) 20 MG tablet     Sig: Take 1 tablet by mouth every 6 hours     Dispense:  20 tablet     Refill:  0     CONSULTS:  None    FINAL IMPRESSION      1. Nausea vomiting and diarrhea          DISPOSITION/PLAN   DISPOSITION Decision To Discharge 01/08/2022 04:51:55 PM      PATIENT REFERRED TO:  Shmuel Santiago MD  [address] [facility] [address]  [phone]          DISCHARGE MEDICATIONS:  New Prescriptions    DICYCLOMINE (BENTYL) 20 MG TABLET    Take 1 tablet by mouth every 6 hours    FAMOTIDINE (PEPCID) 20 MG TABLET    Take 1 tablet by mouth 2 times daily    ONDANSETRON (ZOFRAN-ODT) 4 MG DISINTEGRATING TABLET    Take 1 tablet by mouth 3 times daily as needed for Nausea or Vomiting     Mellissa Stern MD  Attending Emergency Physician      The care is provided during an unprecedented national emergency due to the novel coronavirus, COVID 19. This note was created with the assistance of a speech-recognition program. While intending to generate a document that actually reflects the content of the visit, no guarantees can be provided that every mistake has been identified and corrected by editing.     Jose D Pate MD  80/30/26 3400

## 2022-01-08 NOTE — ED TRIAGE NOTES
Here today with nausea, vomiting, and diarrhea since Wed. Poor appetite- unable to drink or eat, as having nausea then emesis after. \"The only thing I can keep down is ice chips. \" Denies fever, chills, loss of taste, loss of smell, headache, nor cough. States no one else at home is currently ill.

## 2022-01-12 ENCOUNTER — APPOINTMENT (OUTPATIENT)
Dept: CT IMAGING | Age: 42
End: 2022-01-12
Payer: COMMERCIAL

## 2022-01-12 ENCOUNTER — HOSPITAL ENCOUNTER (EMERGENCY)
Age: 42
Discharge: HOME | End: 2022-01-12
Attending: EMERGENCY MEDICINE
Payer: COMMERCIAL

## 2022-01-12 VITALS
HEART RATE: 85 BPM | RESPIRATION RATE: 18 BRPM | TEMPERATURE: 97.5 F | OXYGEN SATURATION: 100 % | SYSTOLIC BLOOD PRESSURE: 142 MMHG | DIASTOLIC BLOOD PRESSURE: 95 MMHG

## 2022-01-12 DIAGNOSIS — R11.2 NAUSEA AND VOMITING, INTRACTABILITY OF VOMITING NOT SPECIFIED, UNSPECIFIED VOMITING TYPE: Primary | ICD-10-CM

## 2022-01-12 LAB
-: ABNORMAL
ABSOLUTE EOS #: 0.03 K/UL (ref 0–0.44)
ABSOLUTE IMMATURE GRANULOCYTE: 0.03 K/UL (ref 0–0.3)
ABSOLUTE LYMPH #: 1.34 K/UL (ref 1.1–3.7)
ABSOLUTE MONO #: 0.3 K/UL (ref 0.1–1.2)
ALBUMIN SERPL-MCNC: 4.5 G/DL (ref 3.5–5.2)
ALBUMIN/GLOBULIN RATIO: ABNORMAL (ref 1–2.5)
ALP BLD-CCNC: 47 U/L (ref 35–104)
ALT SERPL-CCNC: 18 U/L (ref 5–33)
AMORPHOUS: ABNORMAL
ANION GAP SERPL CALCULATED.3IONS-SCNC: 16 MMOL/L (ref 9–17)
AST SERPL-CCNC: 16 U/L
BACTERIA: ABNORMAL
BASOPHILS # BLD: 1 % (ref 0–2)
BASOPHILS ABSOLUTE: 0.04 K/UL (ref 0–0.2)
BILIRUB SERPL-MCNC: 0.47 MG/DL (ref 0.3–1.2)
BILIRUBIN URINE: NEGATIVE
BUN BLDV-MCNC: 15 MG/DL (ref 6–20)
BUN/CREAT BLD: 18 (ref 9–20)
CALCIUM SERPL-MCNC: 9.6 MG/DL (ref 8.6–10.4)
CASTS UA: ABNORMAL /LPF
CHLORIDE BLD-SCNC: 99 MMOL/L (ref 98–107)
CO2: 21 MMOL/L (ref 20–31)
COLOR: YELLOW
COMMENT UA: ABNORMAL
CREAT SERPL-MCNC: 0.82 MG/DL (ref 0.5–0.9)
CRYSTALS, UA: ABNORMAL /HPF
DIFFERENTIAL TYPE: ABNORMAL
EOSINOPHILS RELATIVE PERCENT: 1 % (ref 1–4)
EPITHELIAL CELLS UA: ABNORMAL /HPF (ref 0–5)
GFR AFRICAN AMERICAN: >60 ML/MIN
GFR NON-AFRICAN AMERICAN: >60 ML/MIN
GFR SERPL CREATININE-BSD FRML MDRD: ABNORMAL ML/MIN/{1.73_M2}
GFR SERPL CREATININE-BSD FRML MDRD: ABNORMAL ML/MIN/{1.73_M2}
GLUCOSE BLD-MCNC: 121 MG/DL (ref 70–99)
GLUCOSE URINE: NEGATIVE
HCG QUANTITATIVE: <1 IU/L
HCT VFR BLD CALC: 39.8 % (ref 36.3–47.1)
HEMOGLOBIN: 14.2 G/DL (ref 11.9–15.1)
IMMATURE GRANULOCYTES: 1 %
KETONES, URINE: ABNORMAL
LEUKOCYTE ESTERASE, URINE: NEGATIVE
LIPASE: 16 U/L (ref 13–60)
LYMPHOCYTES # BLD: 27 % (ref 24–43)
MCH RBC QN AUTO: 30.7 PG (ref 25.2–33.5)
MCHC RBC AUTO-ENTMCNC: 35.7 G/DL (ref 28.4–34.8)
MCV RBC AUTO: 86 FL (ref 82.6–102.9)
MONOCYTES # BLD: 6 % (ref 3–12)
MUCUS: ABNORMAL
NITRITE, URINE: NEGATIVE
NRBC AUTOMATED: ABNORMAL PER 100 WBC
OTHER OBSERVATIONS UA: ABNORMAL
PDW BLD-RTO: 11.7 % (ref 11.8–14.4)
PH UA: 7 (ref 5–8)
PLATELET # BLD: 386 K/UL (ref 138–453)
PLATELET ESTIMATE: ABNORMAL
PMV BLD AUTO: 9.6 FL (ref 8.1–13.5)
POTASSIUM SERPL-SCNC: 3.3 MMOL/L (ref 3.7–5.3)
PROTEIN UA: NEGATIVE
RBC # BLD: 4.63 M/UL (ref 3.95–5.11)
RBC # BLD: ABNORMAL 10*6/UL
RBC UA: ABNORMAL /HPF (ref 0–2)
RENAL EPITHELIAL, UA: ABNORMAL /HPF
SEG NEUTROPHILS: 65 % (ref 36–65)
SEGMENTED NEUTROPHILS ABSOLUTE COUNT: 3.24 K/UL (ref 1.5–8.1)
SODIUM BLD-SCNC: 136 MMOL/L (ref 135–144)
SPECIFIC GRAVITY UA: 1.01 (ref 1–1.03)
TOTAL PROTEIN: 7.6 G/DL (ref 6.4–8.3)
TRICHOMONAS: ABNORMAL
TURBIDITY: CLEAR
URINE HGB: ABNORMAL
UROBILINOGEN, URINE: NORMAL
WBC # BLD: 5 K/UL (ref 3.5–11.3)
WBC # BLD: ABNORMAL 10*3/UL
WBC UA: ABNORMAL /HPF (ref 0–5)
YEAST: ABNORMAL

## 2022-01-12 PROCEDURE — 2580000003 HC RX 258: Performed by: PHYSICIAN ASSISTANT

## 2022-01-12 PROCEDURE — 83690 ASSAY OF LIPASE: CPT

## 2022-01-12 PROCEDURE — 96376 TX/PRO/DX INJ SAME DRUG ADON: CPT

## 2022-01-12 PROCEDURE — 85025 COMPLETE CBC W/AUTO DIFF WBC: CPT

## 2022-01-12 PROCEDURE — 96361 HYDRATE IV INFUSION ADD-ON: CPT

## 2022-01-12 PROCEDURE — 96375 TX/PRO/DX INJ NEW DRUG ADDON: CPT

## 2022-01-12 PROCEDURE — 80053 COMPREHEN METABOLIC PANEL: CPT

## 2022-01-12 PROCEDURE — 99283 EMERGENCY DEPT VISIT LOW MDM: CPT

## 2022-01-12 PROCEDURE — 84702 CHORIONIC GONADOTROPIN TEST: CPT

## 2022-01-12 PROCEDURE — 96374 THER/PROPH/DIAG INJ IV PUSH: CPT

## 2022-01-12 PROCEDURE — 74177 CT ABD & PELVIS W/CONTRAST: CPT

## 2022-01-12 PROCEDURE — 6360000002 HC RX W HCPCS: Performed by: PHYSICIAN ASSISTANT

## 2022-01-12 PROCEDURE — 81001 URINALYSIS AUTO W/SCOPE: CPT

## 2022-01-12 PROCEDURE — 6360000004 HC RX CONTRAST MEDICATION: Performed by: PHYSICIAN ASSISTANT

## 2022-01-12 PROCEDURE — 2500000003 HC RX 250 WO HCPCS: Performed by: PHYSICIAN ASSISTANT

## 2022-01-12 RX ORDER — ONDANSETRON 2 MG/ML
4 INJECTION INTRAMUSCULAR; INTRAVENOUS ONCE
Status: COMPLETED | OUTPATIENT
Start: 2022-01-12 | End: 2022-01-12

## 2022-01-12 RX ORDER — 0.9 % SODIUM CHLORIDE 0.9 %
80 INTRAVENOUS SOLUTION INTRAVENOUS ONCE
Status: COMPLETED | OUTPATIENT
Start: 2022-01-12 | End: 2022-01-12

## 2022-01-12 RX ORDER — POTASSIUM CHLORIDE 20 MEQ/1
40 TABLET, EXTENDED RELEASE ORAL 2 TIMES DAILY WITH MEALS
Status: DISCONTINUED | OUTPATIENT
Start: 2022-01-12 | End: 2022-01-12 | Stop reason: HOSPADM

## 2022-01-12 RX ORDER — DICYCLOMINE HCL 20 MG
20 TABLET ORAL 3 TIMES DAILY PRN
Qty: 20 TABLET | Refills: 0 | Status: SHIPPED | OUTPATIENT
Start: 2022-01-12 | End: 2022-01-25 | Stop reason: SDUPTHER

## 2022-01-12 RX ORDER — SODIUM CHLORIDE 0.9 % (FLUSH) 0.9 %
10 SYRINGE (ML) INJECTION PRN
Status: DISCONTINUED | OUTPATIENT
Start: 2022-01-12 | End: 2022-01-12 | Stop reason: HOSPADM

## 2022-01-12 RX ORDER — 0.9 % SODIUM CHLORIDE 0.9 %
1000 INTRAVENOUS SOLUTION INTRAVENOUS ONCE
Status: COMPLETED | OUTPATIENT
Start: 2022-01-12 | End: 2022-01-12

## 2022-01-12 RX ORDER — METOCLOPRAMIDE HYDROCHLORIDE 5 MG/ML
10 INJECTION INTRAMUSCULAR; INTRAVENOUS ONCE
Status: COMPLETED | OUTPATIENT
Start: 2022-01-12 | End: 2022-01-12

## 2022-01-12 RX ORDER — ONDANSETRON 4 MG/1
4 TABLET, FILM COATED ORAL EVERY 8 HOURS PRN
Qty: 20 TABLET | Refills: 0 | Status: SHIPPED | OUTPATIENT
Start: 2022-01-12 | End: 2022-01-25 | Stop reason: ALTCHOICE

## 2022-01-12 RX ORDER — FAMOTIDINE 20 MG/1
20 TABLET, FILM COATED ORAL 2 TIMES DAILY
Qty: 20 TABLET | Refills: 0 | Status: SHIPPED | OUTPATIENT
Start: 2022-01-12 | End: 2022-01-25 | Stop reason: SDUPTHER

## 2022-01-12 RX ADMIN — METOCLOPRAMIDE 10 MG: 5 INJECTION, SOLUTION INTRAMUSCULAR; INTRAVENOUS at 13:54

## 2022-01-12 RX ADMIN — ONDANSETRON 4 MG: 2 INJECTION INTRAMUSCULAR; INTRAVENOUS at 10:37

## 2022-01-12 RX ADMIN — IOPAMIDOL 75 ML: 755 INJECTION, SOLUTION INTRAVENOUS at 13:06

## 2022-01-12 RX ADMIN — SODIUM CHLORIDE 80 ML: 9 INJECTION, SOLUTION INTRAVENOUS at 13:06

## 2022-01-12 RX ADMIN — SODIUM CHLORIDE 1000 ML: 9 INJECTION, SOLUTION INTRAVENOUS at 10:38

## 2022-01-12 RX ADMIN — ONDANSETRON 4 MG: 2 INJECTION INTRAMUSCULAR; INTRAVENOUS at 12:13

## 2022-01-12 RX ADMIN — FAMOTIDINE 20 MG: 10 INJECTION, SOLUTION INTRAVENOUS at 13:54

## 2022-01-12 RX ADMIN — SODIUM CHLORIDE 1000 ML: 9 INJECTION, SOLUTION INTRAVENOUS at 12:14

## 2022-01-12 RX ADMIN — SODIUM CHLORIDE, PRESERVATIVE FREE 10 ML: 5 INJECTION INTRAVENOUS at 13:06

## 2022-01-12 ASSESSMENT — ENCOUNTER SYMPTOMS
BACK PAIN: 0
BLOOD IN STOOL: 0
ABDOMINAL DISTENTION: 0
ABDOMINAL PAIN: 1
CHEST TIGHTNESS: 0
VOMITING: 1
NAUSEA: 1
SHORTNESS OF BREATH: 0
WHEEZING: 0
COUGH: 0
CONSTIPATION: 0
DIARRHEA: 0
RECTAL PAIN: 0
ANAL BLEEDING: 0

## 2022-01-12 ASSESSMENT — PAIN SCALES - GENERAL: PAINLEVEL_OUTOF10: 10

## 2022-01-12 NOTE — ED NOTES
Pt states tried, but is unable to urinate at this time. Denies improvement in symptoms. Awaiting CT scan completion and result prior to update on plan of care.       Dmitriy Dueñas RN  01/12/22 0604

## 2022-01-12 NOTE — ED NOTES
Pt resting on stretcher with eyes closed. Lights turned down for pt comfort. Pt denies improvement in symptoms. Lorrie SILVA informed.      Dahlia Paulino RN  01/12/22 8112

## 2022-01-12 NOTE — ED NOTES
Pt reports onset last Wednesday of nausea/vomiting and states she \"can't keep anything down\". Denies diarrhea.       Steve Lynne, KYLIE  01/12/22 7764

## 2022-01-12 NOTE — ED PROVIDER NOTES
POTASSIUM CHLORIDE (KLOR-CON M) 20 MEQ EXTENDED RELEASE TABLET    Take 1 tablet by mouth daily    VITAMIN D3 (CHOLECALCIFEROL) 25 MCG (1000 UT) TABS TABLET    ONE BY MOUTH DAILY       PAST MEDICAL HISTORY         Diagnosis Date    GERD (gastroesophageal reflux disease)     Irritable bowel syndrome without diarrhea 2016    Moderate persistent asthma without complication     Moderate persistent asthma without complication     Seizures ([facility] [address]     2013, 14       SURGICAL HISTORY           Procedure Laterality Date     SECTION          TUBAL LIGATION          UPPER GASTROINTESTINAL ENDOSCOPY N/A 10/9/2021    EGD BIOPSY performed by Krystian Sequeira MD at [place] [address]           Problem Relation Age of Onset    High Cholesterol Mother     Birth Defects Neg Hx     Diabetes Neg Hx      Family Status   Relation Name Status    Mother  Alive    Father  Alive    Neg Hx  (Not Specified)        SOCIAL HISTORY      reports that she quit smoking about 7 years ago. Her smoking use included cigarettes. She started smoking about 18 years ago. She has a 10.00 pack-year smoking history. She has never used smokeless tobacco. She reports current drug use. Drug: Marijuana Rondi Baba). She reports that she does not drink alcohol. REVIEW OF SYSTEMS    (2-9 systems for level 4, 10 or more for level 5)     Review of Systems   Constitutional: Positive for appetite change. Negative for chills, diaphoresis, fatigue, fever and unexpected weight change. Respiratory: Negative for cough, chest tightness, shortness of breath and wheezing. Cardiovascular: Negative for chest pain, palpitations and leg swelling. Gastrointestinal: Positive for abdominal pain, nausea and vomiting. Negative for abdominal distention, anal bleeding, blood in stool, constipation, diarrhea and rectal pain. Genitourinary: Positive for decreased urine volume.  Negative for difficulty urinating, dysuria, flank pain, frequency and urgency. Musculoskeletal: Negative for back pain and myalgias. Neurological: Negative for dizziness, syncope, weakness, light-headedness and headaches. Except as noted above the remainder of the review of systems was reviewed and negative. PHYSICAL EXAM    (up to 7 for level 4, 8 or more for level 5)     ED Triage Vitals   BP Temp Temp src Pulse Resp SpO2 Height Weight   01/12/22 1010 01/12/22 1011 -- 01/12/22 1010 01/12/22 1010 01/12/22 1010 -- --   (!) 142/95 97.5 °F (36.4 °C)  85 18 100 %         Physical Exam  Vitals and nursing note reviewed. Constitutional:       General: She is not in acute distress. Appearance: Normal appearance. She is well-developed. She is not ill-appearing, toxic-appearing or diaphoretic. HENT:      Head: Normocephalic and atraumatic. Eyes:      General: No scleral icterus. Right eye: No discharge. Left eye: No discharge. Conjunctiva/sclera: Conjunctivae normal.   Neck:      Thyroid: No thyroid mass, thyromegaly or thyroid tenderness. Cardiovascular:      Rate and Rhythm: Normal rate and regular rhythm. Heart sounds: Normal heart sounds. No murmur heard. No friction rub. No gallop. Pulmonary:      Effort: Pulmonary effort is normal. No respiratory distress. Breath sounds: Normal breath sounds. No stridor. No wheezing, rhonchi or rales. Chest:      Chest wall: No tenderness. Abdominal:      General: Abdomen is flat. Bowel sounds are normal. There is no distension. Palpations: Abdomen is soft. There is no mass. Tenderness: There is abdominal tenderness in the epigastric area. There is no right CVA tenderness, left CVA tenderness, guarding or rebound. Hernia: No hernia is present. Musculoskeletal:         General: No tenderness. Normal range of motion. Cervical back: Normal range of motion and neck supple. Skin:     General: Skin is warm and dry.    Neurological:      General: No focal deficit present. Mental Status: She is alert and oriented to person, place, and time. Psychiatric:         Attention and Perception: Attention and perception normal.         Mood and Affect: Mood and affect normal.         Speech: Speech normal.         Behavior: Behavior normal. Behavior is cooperative. Thought Content: Thought content normal.         Cognition and Memory: Cognition and memory normal.         Judgment: Judgment normal.           DIAGNOSTIC RESULTS     EKG: All EKG's are interpreted by the Emergency Department Physician who either signs or Co-signs this chart in the absence of a cardiologist.    None indicated    RADIOLOGY:   Non-plain film images such as CT, Ultrasound and MRI are read by the radiologist. Plain radiographic images are visualized and preliminarily interpreted by the emergency physician with the below findings:    1. No evidence of acute process within the abdomen or pelvis. 2. Stable bilateral polycystic kidneys with punctate nephrolithiasis. 3. Stable uterine fundal fibroid.      Interpretation per the Radiologist below, if available at the time of this note:        ED BEDSIDE ULTRASOUND:   Performed by ED Physician - none    LABS:  Results for orders placed or performed during the hospital encounter of 01/12/22   CBC Auto Differential   Result Value Ref Range    WBC 5.0 3.5 - 11.3 k/uL    RBC 4.63 3.95 - 5.11 m/uL    Hemoglobin 14.2 11.9 - 15.1 g/dL    Hematocrit 39.8 36.3 - 47.1 %    MCV 86.0 82.6 - 102.9 fL    MCH 30.7 25.2 - 33.5 pg    MCHC 35.7 (H) 28.4 - 34.8 g/dL    RDW 11.7 (L) 11.8 - 14.4 %    Platelets 036 137 - 121 k/uL    MPV 9.6 8.1 - 13.5 fL    NRBC Automated NOT REPORTED 0.0 per 100 WBC    Differential Type NOT REPORTED     WBC Morphology NOT REPORTED     RBC Morphology NOT REPORTED     Platelet Estimate NOT REPORTED     Seg Neutrophils 65 36 - 65 %    Lymphocytes 27 24 - 43 %    Monocytes 6 3 - 12 %    Eosinophils % 1 1 - 4 %    Basophils 1 0 - 2 % Immature Granulocytes 1 (H) 0 %    Segs Absolute 3.24 1.50 - 8.10 k/uL    Absolute Lymph # 1.34 1.10 - 3.70 k/uL    Absolute Mono # 0.30 0.10 - 1.20 k/uL    Absolute Eos # 0.03 0.00 - 0.44 k/uL    Basophils Absolute 0.04 0.00 - 0.20 k/uL    Absolute Immature Granulocyte 0.03 0.00 - 0.30 k/uL   Comprehensive Metabolic Panel   Result Value Ref Range    Glucose 121 (H) 70 - 99 mg/dL    BUN 15 6 - 20 mg/dL    CREATININE 0.82 0.50 - 0.90 mg/dL    Bun/Cre Ratio 18 9 - 20    Calcium 9.6 8.6 - 10.4 mg/dL    Sodium 136 135 - 144 mmol/L    Potassium 3.3 (L) 3.7 - 5.3 mmol/L    Chloride 99 98 - 107 mmol/L    CO2 21 20 - 31 mmol/L    Anion Gap 16 9 - 17 mmol/L    Alkaline Phosphatase 47 35 - 104 U/L    ALT 18 5 - 33 U/L    AST 16 <32 U/L    Total Bilirubin 0.47 0.3 - 1.2 mg/dL    Total Protein 7.6 6.4 - 8.3 g/dL    Albumin 4.5 3.5 - 5.2 g/dL    Albumin/Globulin Ratio NOT REPORTED 1.0 - 2.5    GFR Non-African American >60 >60 mL/min    GFR African American >60 >60 mL/min    GFR Comment          GFR Staging NOT REPORTED    Lipase   Result Value Ref Range    Lipase 16 13 - 60 U/L   Urinalysis Reflex to Culture    Specimen: Urine, clean catch   Result Value Ref Range    Color, UA Yellow Yellow    Turbidity UA Clear Clear    Glucose, Ur NEGATIVE NEGATIVE    Bilirubin Urine NEGATIVE NEGATIVE    Ketones, Urine TRACE (A) NEGATIVE    Specific Gravity, UA 1.010 1.005 - 1.030    Urine Hgb 2+ (A) NEGATIVE    pH, UA 7.0 5.0 - 8.0    Protein, UA NEGATIVE NEGATIVE    Urobilinogen, Urine Normal Normal    Nitrite, Urine NEGATIVE NEGATIVE    Leukocyte Esterase, Urine NEGATIVE NEGATIVE    Urinalysis Comments NOT REPORTED    HCG, Quantitative, Pregnancy   Result Value Ref Range    hCG Quant <1 <5 IU/L   Microscopic Urinalysis   Result Value Ref Range    -          WBC, UA None 0 - 5 /HPF    RBC, UA 2 TO 5 0 - 2 /HPF    Casts UA NOT REPORTED /LPF    Crystals, UA NOT REPORTED None /HPF    Epithelial Cells UA 2 TO 5 0 - 5 /HPF    Renal Epithelial, UA NOT REPORTED 0 /HPF    Bacteria, UA FEW (A) None    Mucus, UA NOT REPORTED None    Trichomonas, UA NOT REPORTED None    Amorphous, UA NOT REPORTED None    Other Observations UA NOT REPORTED NOT REQ. Yeast, UA NOT REPORTED None       All other labs were within normal range or not returned as of this dictation. EMERGENCY DEPARTMENT COURSE and DIFFERENTIAL DIAGNOSIS/MDM:   Vitals:    Vitals:    01/12/22 1010 01/12/22 1011   BP: (!) 142/95    Pulse: 85    Resp: 18    Temp:  97.5 °F (36.4 °C)   SpO2: 100%            Medical Decision Making patient is a 42-year-old female with complaint of nausea and vomiting for the last week. Patient was given IV fluids and Zofran, Pepcid and patient had some relief of symptoms. Patient had potassium of 3.3 and oral supplementation given prior to discharge. CT scan of the abdomen pelvis showed no acute findings. Patient is discharged home in stable condition with prescriptions for Pepcid, Bentyl and Zofran. She is to drink fluids and follow-up with primary care doctor and return to the emergency room if symptoms worsen    CONSULTS:  None    PROCEDURES:  None    FINAL IMPRESSION      1.  Nausea and vomiting, intractability of vomiting not specified, unspecified vomiting type          DISPOSITION/PLAN   DISPOSITION Decision To Discharge 01/12/2022 02:49:21 PM      PATIENT REFERRED TO:   Madai Chang MD  [address] [place] [address] [address] [phone]    Schedule an appointment as soon as possible for a visit in 2 days  return, If symptoms worsen      DISCHARGE MEDICATIONS:     New Prescriptions    DICYCLOMINE (BENTYL) 20 MG TABLET    Take 1 tablet by mouth 3 times daily as needed (abd cramping)    FAMOTIDINE (PEPCID) 20 MG TABLET    Take 1 tablet by mouth 2 times daily    ONDANSETRON (ZOFRAN) 4 MG TABLET    Take 1 tablet by mouth every 8 hours as needed for Nausea or Vomiting         (Please note that portions of this note were completed with a voice recognition program. Efforts were made to edit the dictations but occasionally words are mis-transcribed.)    Marcellus SILVA PA-C  Attending Emergency Physician          Bobby Laura PA-C  01/12/22 6899

## 2022-01-13 DIAGNOSIS — R56.01 COMPLEX FEBRILE CONVULSION (HCC): ICD-10-CM

## 2022-01-14 RX ORDER — LEVETIRACETAM 750 MG/1
TABLET ORAL
Qty: 60 TABLET | Refills: 3 | Status: SHIPPED | OUTPATIENT
Start: 2022-01-14 | End: 2022-05-14

## 2022-01-14 NOTE — TELEPHONE ENCOUNTER
Request for Keppra.       Next Visit Date:  Future Appointments   Date Time Provider Rachel Luc[place]   1/25/2022 10:30 AM Dario Smith MD [facility] IM [address] Maintenance   Topic Date Due    Hepatitis C screen  Never done    COVID-19 Vaccine (1) Never done    Cervical cancer screen  02/05/2021    Depression Screen  10/19/2022    DTaP/Tdap/Td vaccine (2 - Td or Tdap) 09/13/2026    Pneumococcal 0-64 years Vaccine (2 of 2 - PPSV23) 02/27/2047    Flu vaccine  Completed    HIV screen  Completed    Hepatitis A vaccine  Aged Out    Hepatitis B vaccine  Aged Out    Hib vaccine  Aged Out    Meningococcal (ACWY) vaccine  Aged Out    Varicella vaccine  Discontinued       No results found for: LABA1C          ( goal A1C is < 7)   No results found for: LABMICR  LDL Cholesterol (mg/dL)   Date Value   06/11/2020 97       (goal LDL is <100)   AST (U/L)   Date Value   01/12/2022 16     ALT (U/L)   Date Value   01/12/2022 18     BUN (mg/dL)   Date Value   01/12/2022 15     BP Readings from Last 3 Encounters:   01/12/22 (!) 142/95   01/08/22 (!) 140/84   10/19/21 103/76          (goal 120/80)    All Future Testing planned in [facility]        Patient Active Problem List:     GERD (gastroesophageal reflux disease)     Mental retardation     Seizure (HCC)     Irritable bowel syndrome without diarrhea     Intractable nausea and vomiting     Marijuana use     Moderate persistent asthma without complication     Gastroesophageal reflux disease with esophagitis without hemorrhage     Intractable vomiting

## 2022-01-19 NOTE — ED PROVIDER NOTES
eMERGENCY dEPARTMENT eNCOUnter   Independent Attestation     Pt Name: Destin Ramirez  MRN: [id number]  [place] 1980  Date of evaluation: 1/19/22     Destin Ramirez is a 46 y.o. female with CC: Emesis (x several days; unable to keep anything down )        This visit was performed by both a physician and an APC. I performed all aspects of the MDM as documented. The care is provided during an unprecedented national emergency due to the novel coronavirus, COVID 19.     Brittney Rojas MD  Attending Emergency Physician            Brittney Rojas MD  01/19/22 9099

## 2022-01-25 ENCOUNTER — VIRTUAL VISIT (OUTPATIENT)
Dept: INTERNAL MEDICINE | Age: 42
End: 2022-01-25
Payer: COMMERCIAL

## 2022-01-25 DIAGNOSIS — R11.2 INTRACTABLE NAUSEA AND VOMITING: Primary | ICD-10-CM

## 2022-01-25 DIAGNOSIS — K21.9 GASTROESOPHAGEAL REFLUX DISEASE WITHOUT ESOPHAGITIS: ICD-10-CM

## 2022-01-25 DIAGNOSIS — R56.9 SEIZURE (HCC): ICD-10-CM

## 2022-01-25 DIAGNOSIS — E87.6 HYPOKALEMIA: ICD-10-CM

## 2022-01-25 DIAGNOSIS — E55.9 VITAMIN D DEFICIENCY: ICD-10-CM

## 2022-01-25 DIAGNOSIS — K58.9 IRRITABLE BOWEL SYNDROME WITHOUT DIARRHEA: ICD-10-CM

## 2022-01-25 DIAGNOSIS — J45.20 MILD INTERMITTENT REACTIVE AIRWAY DISEASE WITHOUT COMPLICATION: ICD-10-CM

## 2022-01-25 PROCEDURE — 99443 PR PHYS/QHP TELEPHONE EVALUATION 21-30 MIN: CPT | Performed by: STUDENT IN AN ORGANIZED HEALTH CARE EDUCATION/TRAINING PROGRAM

## 2022-01-25 RX ORDER — MELATONIN
Qty: 30 TABLET | Refills: 3 | Status: SHIPPED | OUTPATIENT
Start: 2022-01-25 | End: 2022-06-16

## 2022-01-25 RX ORDER — ONDANSETRON 4 MG/1
4 TABLET, FILM COATED ORAL EVERY 8 HOURS PRN
Qty: 20 TABLET | Refills: 0 | Status: CANCELLED | OUTPATIENT
Start: 2022-01-25

## 2022-01-25 RX ORDER — DICYCLOMINE HCL 20 MG
20 TABLET ORAL 3 TIMES DAILY PRN
Qty: 20 TABLET | Refills: 0 | Status: SHIPPED | OUTPATIENT
Start: 2022-01-25

## 2022-01-25 RX ORDER — POTASSIUM CHLORIDE 750 MG/1
30 TABLET, EXTENDED RELEASE ORAL DAILY
Qty: 90 TABLET | Refills: 3 | Status: SHIPPED | OUTPATIENT
Start: 2022-01-25 | End: 2022-06-16

## 2022-01-25 RX ORDER — METOCLOPRAMIDE 5 MG/1
5 TABLET ORAL
Qty: 90 TABLET | Refills: 0 | Status: SHIPPED | OUTPATIENT
Start: 2022-01-25 | End: 2022-02-24

## 2022-01-25 RX ORDER — FAMOTIDINE 20 MG/1
20 TABLET, FILM COATED ORAL 2 TIMES DAILY
Qty: 20 TABLET | Refills: 0 | Status: CANCELLED | OUTPATIENT
Start: 2022-01-25

## 2022-01-25 RX ORDER — PANTOPRAZOLE SODIUM 40 MG/1
40 TABLET, DELAYED RELEASE ORAL
Qty: 30 TABLET | Refills: 1 | Status: SHIPPED | OUTPATIENT
Start: 2022-01-25 | End: 2022-10-11 | Stop reason: SDUPTHER

## 2022-01-25 RX ORDER — ALBUTEROL SULFATE 90 UG/1
AEROSOL, METERED RESPIRATORY (INHALATION)
Qty: 18 G | Refills: 3 | Status: SHIPPED | OUTPATIENT
Start: 2022-01-25 | End: 2022-06-16

## 2022-01-25 ASSESSMENT — ENCOUNTER SYMPTOMS
ALLERGIC/IMMUNOLOGIC NEGATIVE: 1
GASTROINTESTINAL NEGATIVE: 1
EYES NEGATIVE: 1
RESPIRATORY NEGATIVE: 1

## 2022-01-25 NOTE — PROGRESS NOTES
Attending Physician Statement  I have discussed the care of [address], including pertinent history and exam findings with the resident. I have reviewed the key elements of all parts of the encounter with the resident. I agree with the assessment, and status of the problem list as documented. Diagnosis Orders   1. Intractable nausea and vomiting - sec to marijuana use. She is reportedly cutting down. No more refills of zofran. reglan refilled only. metoclopramide (REGLAN) 5 MG tablet   2. Hypokalemia  potassium chloride (KLOR-CON M) 10 MEQ extended release tablet    Potassium    Potassium, Urine, Random    Creatinine, Random Urine   3. Vitamin D deficiency  vitamin D3 (CHOLECALCIFEROL) 25 MCG (1000 UT) TABS tablet   4. Gastroesophageal reflux disease without esophagitis  pantoprazole (PROTONIX) 40 MG tablet   5. Mild intermittent reactive airway disease without complication  albuterol sulfate  (90 Base) MCG/ACT inhaler   6. Irritable bowel syndrome without diarrhea  dicyclomine (BENTYL) 20 MG tablet   7. Seizure [facility])  Josette Torres MD, Neurology, [address]      The plan and orders should include   Orders Placed This Encounter   Procedures    Potassium    Potassium, Urine, Random    Creatinine, Random Urine    Merle Yun MD, Neurology, [facility] [address]    and this was also documented by the resident. I agree with the referral to [address] [place] [address]. The medication list was reviewed with the resident and is up to date. The return visit should be in 4 months .     Dr Gui Dennison MD, [address]  Associate , Department of Internal Medicine  Resident Ambulatory Site Medical Director  [address] [facility] Internal Medicine  [address] [place] [address],[address]  Internal Medicine Clerkship - Kylie Puckett    1/25/2022, 11:24 AM

## 2022-01-25 NOTE — PATIENT INSTRUCTIONS
Medications e-scribe to pharmacy of pt's choice. Laboratory Instructions: Your doctor has ordered blood or urine testing. You can get this testing done at the Lab located on the first floor of the [facility], or [facility]. Please stop at Main Registration, before going to the lab, as you must be registered first.   Please get this lab done before your next visit. You may eat or drink before this test.  Labs mailed to patient    Referral to Neurology was placed, summary of care printed and faxed to office, phone numbers mailed to the patient, they will contact office for an appt    Return To Clinic 5/24/2022. After Visit Summary  mailed to patient. --    It is very important for your care that you keep your appointment. If for some reason you are unable to keep your appointment it is equally important that you call our office at [phone] to cancel your appointment and reschedule. Failure to do so may result in your termination from our practice.

## 2022-01-25 NOTE — PROGRESS NOTES
2022    TELEHEALTH EVALUATION -- Audio(During IKH-13 public health emergency)    Patient is evaluated via telephone on 2020. Consent:  The patient and/or health care decision maker is aware that that he may receive a bill for this telephone service, depending on his insurance coverage, and has provided verbal consent to proceed: Yes    HPI:    Steve Hatch (:  1980) has requested an audio evaluation for the following concern(s):    3-month follow-up. No complaints/recent illnesses or hospitalizations since her last visit. Compliant with her medications. Denies smoking cigarettes/alcohol use. Consumes marijuana occasionally. Counseled on abstaining from marijuana use. Labs reviewed. Potassium 3.3. On potassium pills 20 mEq daily. Patient has history of seizure disorder on Keppra. Has not followed up with a neurologist in some time. She is due for Pap smear. We will schedule one in the next visit. Review of Systems   Constitutional: Negative. HENT: Negative. Eyes: Negative. Respiratory: Negative. Cardiovascular: Negative. Gastrointestinal: Negative. Endocrine: Negative. Genitourinary: Negative. Musculoskeletal: Negative. Skin: Negative. Allergic/Immunologic: Negative. Neurological: Negative. Hematological: Negative. Psychiatric/Behavioral: Negative. Prior to Visit Medications    Medication Sig Taking?  Authorizing Provider   levETIRAcetam (KEPPRA) 750 MG tablet TAKE ONE TABLET BY MOUTH TWICE A DAY Yes Yvon Guzman MD   dicyclomine (BENTYL) 20 MG tablet Take 1 tablet by mouth 3 times daily as needed (abd cramping) Yes Lorrie Hallman PA-C   ondansetron (ZOFRAN) 4 MG tablet Take 1 tablet by mouth every 8 hours as needed for Nausea or Vomiting Yes Andre Gomez PA-C   famotidine (PEPCID) 20 MG tablet Take 1 tablet by mouth 2 times daily Yes Karena Ho MD   potassium chloride (KLOR-CON M) 20 MEQ extended release tablet Take 1 tablet by mouth daily Yes Dejon Galloway MD   albuterol sulfate  (90 Base) MCG/ACT inhaler INHALE TWO PUFFS BY MOUTH EVERY 6 HOURS AS NEEDED FOR WHEEZING Yes Historical Provider, MD   pantoprazole (PROTONIX) 40 MG tablet Take 1 tablet by mouth every morning (before breakfast) Yes Andrew Chacon MD   metoclopramide (REGLAN) 5 MG tablet Take 1 tablet by mouth 3 times daily (with meals) Yes Andrew Chacon MD   vitamin D3 (CHOLECALCIFEROL) 25 MCG (1000 UT) TABS tablet ONE BY MOUTH DAILY Yes Richard Plummer MD       Social History     Tobacco Use    Smoking status: Former Smoker     Packs/day: 1.00     Years: 10.00     Pack years: 10.00     Types: Cigarettes     Start date: 10/19/2003     Quit date: 3/7/2014     Years since quittin.8    Smokeless tobacco: Never Used    Tobacco comment: denies current use    Substance Use Topics    Alcohol use: No    Drug use: Yes     Types: Marijuana (Weed)     Comment: used 10-15 years ago            RECORD REVIEW: Previous medical records were reviewed at today's visit. PHYSICAL EXAMINATION: Could not be done as this is a virtual visit    Vital Signs: (As obtained by patient/caregiver at home)      Constitutional: [] Appears well-developed and well-nourished [] No apparent distress      [] Abnormal   Mental status  [] Alert and awake  [] Oriented to person/place/time []Able to follow commands        Pulmonary/Chest: [] Respiratory effort normal.  [] No visualized signs of difficulty breathing or respiratory distress        [] Abnormal              Psychiatric:       [] Normal [] Abnormal        [] No Hallucinations    Other pertinent observable physical exam findings:        RECORD REVIEW: Previous medical records were reviewed at today's visit. The past family, medical and social histories were reviewed and unchanged with the exceptions of what is mentioned in this note.     Due to this being a TeleHealth encounter, evaluation of the following organ systems is limited: Vitals/Constitutional/EENT/Resp/CV/GI//MS/Neuro/Skin/Heme-Lymph-Imm. ASSESSMENT/PLAN:  1. Hypokalemia  On potassium pills 20 mEq daily  Last potassium 10 days back was 3.3. We will increase it to 30 mEq daily  Follow-up urine potassium creatinine ratio to rule in/rule out renal cause for hypokalemia    2. Vitamin D deficiency  Continue vitamin D 1000 units daily    3. Intractable nausea and vomiting  Well-controlled. Continue Reglan. Stop Zofran, to avoid QT prolongation risk  Counseled on abstaining from marijuana use    4. Gastroesophageal reflux disease without esophagitis  Continue Protonix. Stop Pepcid    5. Mild intermittent reactive airway disease without complication  Well-controlled. On albuterol as needed    6. Seizure disorder  Patient to establish care with a neurologist due to history of seizure disorder on Keppra    BMI 19.8. Her height is recorded as 5.7 which is an error. She is 5.2    Vaccines:  She got both her Covid shots    Screening:  Due for Pap smear this year in 1 month. No previous records of her Pap smear report. Will obtain from OB/GYN    Total Time spent 25 min     No follow-ups on file. An  electronic signature was used to authenticate this note. --Bishop Miller MD on 1/25/2022 at 10:54 AM    9}    Pursuant to the emergency declaration under the [facility] waiver authority and the [facility] and [employer] Act, this Virtual  Visit was conducted, with patient's consent, to reduce the patient's risk of exposure to COVID-19 and provide continuity of care for an established patient. Services were provided through a telephone discussion virtually to substitute for in-person clinic visit.      Bishop Miller MD  PGY-3, Internal Medicine Resident  [facility]   1/25/2022 10:54 AM

## 2022-03-01 ENCOUNTER — HOSPITAL ENCOUNTER (OUTPATIENT)
Age: 42
Setting detail: SPECIMEN
Discharge: HOME | End: 2022-03-01

## 2022-03-01 DIAGNOSIS — E87.6 HYPOKALEMIA: ICD-10-CM

## 2022-03-01 LAB
CREATININE URINE: 184 MG/DL (ref 28–217)
POTASSIUM SERPL-SCNC: 4.7 MMOL/L (ref 3.7–5.3)
POTASSIUM, UR: 48 MMOL/L

## 2022-03-30 DIAGNOSIS — E87.6 HYPOKALEMIA: Primary | ICD-10-CM

## 2022-03-30 NOTE — RESULT ENCOUNTER NOTE
Patient could not be reached over phone.  Will place orders for urine labs and nephro referral and retry in AM

## 2022-03-31 NOTE — RESULT ENCOUNTER NOTE
Spoke to patient this morning. Explained labs with her and recommended to get more labs for her urine. Requested her to follow-up with nephrologist.  She is she has an appointment with me in May.

## 2022-05-12 DIAGNOSIS — R56.01 COMPLEX FEBRILE CONVULSION (HCC): ICD-10-CM

## 2022-05-13 NOTE — TELEPHONE ENCOUNTER
Request for Keppra.       Next Visit Date:  Future Appointments   Date Time Provider Rachel Wagneri   5/24/2022  9:30 AM Clare Jhaveri MD [facility] IM [facility]   5/26/2022 10:00 AM Richard Galvan MD Neuro Spec [address] Maintenance   Topic Date Due    COVID-19 Vaccine (1) Never done    Hepatitis C screen  Never done    Pneumococcal 0-64 years Vaccine (2 - PCV) 01/16/2018    Cervical cancer screen  02/05/2021    Depression Screen  10/19/2022    Lipids  06/11/2025    DTaP/Tdap/Td vaccine (2 - Td or Tdap) 09/13/2026    Flu vaccine  Completed    HIV screen  Completed    Hepatitis A vaccine  Aged Out    Hepatitis B vaccine  Aged Out    Hib vaccine  Aged Out    Meningococcal (ACWY) vaccine  Aged Out    Varicella vaccine  Discontinued       No results found for: LABA1C          ( goal A1C is < 7)   No results found for: LABMICR  LDL Cholesterol (mg/dL)   Date Value   06/11/2020 97       (goal LDL is <100)   AST (U/L)   Date Value   01/12/2022 16     ALT (U/L)   Date Value   01/12/2022 18     BUN (mg/dL)   Date Value   01/12/2022 15     BP Readings from Last 3 Encounters:   01/12/22 (!) 142/95   01/08/22 (!) 140/84   10/19/21 103/76          (goal 120/80)    All Future Testing planned in [facility]  Lab Frequency Next Occurrence   Potassium, Urine, Random Once 07/07/2022   Sodium, Urine, Random Once 07/07/2022   Chloride, Urine, Random Once 07/07/2022   Osmolality, Urine Once 07/07/2022         Patient Active Problem List:     GERD (gastroesophageal reflux disease)     Mental retardation     Seizure ([facility] [address]     Irritable bowel syndrome without diarrhea     Intractable nausea and vomiting     Marijuana use     Moderate persistent asthma without complication     Gastroesophageal reflux disease with esophagitis without hemorrhage     Intractable vomiting

## 2022-05-14 RX ORDER — LEVETIRACETAM 750 MG/1
TABLET ORAL
Qty: 60 TABLET | Refills: 3 | Status: SHIPPED | OUTPATIENT
Start: 2022-05-14 | End: 2022-09-18

## 2022-06-03 ENCOUNTER — OFFICE VISIT (OUTPATIENT)
Dept: NEUROLOGY | Age: 42
End: 2022-06-03
Payer: COMMERCIAL

## 2022-06-03 VITALS
HEIGHT: 62 IN | HEART RATE: 100 BPM | DIASTOLIC BLOOD PRESSURE: 83 MMHG | SYSTOLIC BLOOD PRESSURE: 119 MMHG | BODY MASS INDEX: 20.06 KG/M2 | WEIGHT: 109 LBS

## 2022-06-03 DIAGNOSIS — R56.9 SEIZURE (HCC): Primary | ICD-10-CM

## 2022-06-03 PROCEDURE — G8427 DOCREV CUR MEDS BY ELIG CLIN: HCPCS | Performed by: PSYCHIATRY & NEUROLOGY

## 2022-06-03 PROCEDURE — G8420 CALC BMI NORM PARAMETERS: HCPCS | Performed by: PSYCHIATRY & NEUROLOGY

## 2022-06-03 PROCEDURE — 1036F TOBACCO NON-USER: CPT | Performed by: PSYCHIATRY & NEUROLOGY

## 2022-06-03 PROCEDURE — 99204 OFFICE O/P NEW MOD 45 MIN: CPT | Performed by: PSYCHIATRY & NEUROLOGY

## 2022-06-03 NOTE — PROGRESS NOTES
[facility], [address], [address] [place] [address]  Ph: [phone] or [phone]  FAX: [phone]    Chief Complaint: Seizures     Dear Juliane Ignacio MD     I had the pleasure of seeing your patient today in neurology consultation for her symptoms. As you would recall Martin Red is a 38 y.o. female. The patient presents to the office to establish neurological care for her seizures. The patient reports to an onset of seizures beginning when she was a baby. She has been taking Keppra since , and she reports to 7-8 seizures in her life. The patient's last breakthrough seizure was over 5 years ago. She is not aware of her events, but her mother have witnessed these events previously. It is possible that there are events going unnoticed. During her episodes, patient endorses tongue biting on both sides. She admits to medication compliance with Keppra, but the patient has observed increased hunger as a possible side effect. The patient denies driving. She currently lives her mother and daughter.           Current prophylactic medication Dosage   Keppra 750 mg BID               Previous prophylactic medications Reason for discontinuation                             Neurological Workup:       Past Medical History:   Diagnosis Date    GERD (gastroesophageal reflux disease)     Irritable bowel syndrome without diarrhea 2016    Moderate persistent asthma without complication     Moderate persistent asthma without complication     Seizures ([address]     2013, 14     Past Surgical History:   Procedure Laterality Date     SECTION          TUBAL LIGATION          UPPER GASTROINTESTINAL ENDOSCOPY N/A 10/9/2021    EGD BIOPSY performed by Mychal Fairchild MD at [address]   Allergen Reactions    Omeprazole      Pt had a seizure     Family History   Problem Relation Age of Onset    High Cholesterol Mother     Birth Defects Neg Hx  Diabetes Neg Hx       Social History     Socioeconomic History    Marital status: Single     Spouse name: Not on file    Number of children: Not on file    Years of education: Not on file    Highest education level: Not on file   Occupational History    Not on file   Tobacco Use    Smoking status: Former Smoker     Packs/day: 1.00     Years: 10.00     Pack years: 10.00     Types: Cigarettes     Start date: 10/19/2003     Quit date: 3/7/2014     Years since quittin.2    Smokeless tobacco: Never Used    Tobacco comment: denies current use    Substance and Sexual Activity    Alcohol use: No    Drug use: Yes     Types: Marijuana (Weed)     Comment: used 10-15 years ago    Sexual activity: Never   Other Topics Concern    Not on file   Social History Narrative    Not on file     Social Determinants of Health     Financial Resource Strain: Low Risk     Difficulty of Paying Living Expenses: Not hard at all   Food Insecurity: No Food Insecurity    Worried About [address] in the Last Year: Never true    [address] in the Last Year: Never true   Transportation Needs:     Lack of Transportation (Medical): Not on file    Lack of Transportation (Non-Medical):  Not on file   Physical Activity:     Days of Exercise per Week: Not on file    Minutes of Exercise per Session: Not on file   Stress:     Feeling of Stress : Not on file   Social Connections:     Frequency of Communication with Friends and Family: Not on file    Frequency of Social Gatherings with Friends and Family: Not on file    Attends Confucianism Services: Not on file    Active Member of Clubs or Organizations: Not on file    Attends Club or Organization Meetings: Not on file    Marital Status: Not on file   Intimate Partner Violence:     Fear of Current or Ex-Partner: Not on file    Emotionally Abused: Not on file    Physically Abused: Not on file    Sexually Abused: Not on file   Housing Stability:     Unable to Pay for Housing in the Last Year: Not on file    Number of Places Lived in the Last Year: Not on file    Unstable Housing in the Last Year: Not on file      /83 (Site: Left Upper Arm, Position: Sitting, Cuff Size: Medium Adult)   Pulse 100   Ht 5' 2\" (1.575 m)   Wt 109 lb (49.4 kg)   BMI 19.94 kg/m²       HEENT [] Hearing Loss  [] Visual Disturbance  [] Tinnitus  [] Eye pain   Respiratory [] Shortness of Breath  [] Cough  [] Snoring   Cardiovascular [] Chest Pain  [] Palpitations  [] Lightheaded   GI [] Constipation  [] Diarrhea  [] Swallowing change  [] Nausea/vomiting    [] Urinary Frequency  [] Urinary Urgency   Musculoskeletal [] Neck pain  [] Back pain  [] Muscle pain  [] Restless legs   Dermatologic [] Skin changes   Neurologic [] Memory loss/confusion  [x] Seizures  [] Trouble walking or imbalance  [] Dizziness  [] Sleep disturbance  [] Weakness  [] Numbness  [] Tremors  [] Speech Difficulty  [] Headaches  [] Light Sensitivity  [] Sound Sensitivity   Endocrinology []Excessive thirst  []Excessive hunger   Psychiatric [] Anxiety/Depression  [] Hallucination   Allergy/immunology []Hives/environmental allergies   Hematologic/lymph [] Abnormal bleeding  [] Abnormal bruising       General appearence: Normal. Well groomed.  In no acute distress    Head: Normocephalic, atraumatic  Eyes: Extraocular movements intact, eye lids normal  Lungs: Respirations unlabored, chest wall no deformity  ENT: Normal external ear canals, no sinus tenderness  Heart: Regular rate rhythm  Abdomen: No masses, tenderness  Extremities: No cyanosis or edema, 2+ pulses  Musculoskeletal: Normal range of motion in all joints  Skin: Intact, normal skin color    Neurological examination:    Mental status   Alert and oriented; intact memory with no confusion, speech or language problems; no hallucinations or delusions     Cranial nerves   II - visual fields intact to confrontation                                                III, IV, VI - extra-ocular muscles full: no pupillary defect; no MCKENZIE, no nystagmus, no ptosis   V - normal facial sensation                                                               VII - normal facial symmetry                                                             VIII - intact hearing                                                                             IX, X - symmetrical palate                                                                  XI - symmetrical shoulder shrug                                                       XII - midline tongue without atrophy or fasciculation     Motor function  Normal muscle bulk and tone; normal power 5/5, including fine motor movements     Sensory function Intact to touch, pin, vibration, proprioception     Cerebellar Intact fine motor movement. No involuntary movements or tremors     Reflex function Intact 2+ DTR and symmetric. Negative Babinski     Gait                  Normal station and gait       Lab Results   Component Value Date    LDLCHOLESTEROL 97 06/11/2020     No components found for: CHLPL  Lab Results   Component Value Date    TRIG 109 06/11/2020     Lab Results   Component Value Date    HDL 91 06/11/2020     No results found for: LDLCALC  No results found for: LABVLDL  No results found for: LABA1C  No results found for: EAG  No results found for: [id number]   Neurological work up:  CT head  CTA head and neck  MRI brain   2 D echo  EEG brain 2014 showed intermittent generalized and bifrontal epileptiform activity     All of patient's labs were personally reviewed. All the imaging studies were personally reviewed and discussed with the patient. Assessment Recommendations:  Patient is a previous history of epilepsy. By description of semiology, symptoms suggest nocturnal myoclonic epilepsy    Patient has previous work-up done at [address] [place] [address] including EEG and MRI scan of the brain.   While MRI of the brain was normal, EEG showed intermittent generalized and bifrontal epileptiform activity. The patient remains neurologically stable, and she has been seizure-free for approximately 5 years. Recommended to continue Keppra 750 mg BID for seizure prophylaxis. I advised her to call me if any breakthrough seizures are reported. At this time, I do not believe repeating the imaging study or EEG will change the diagnosis since patient is neurologically stable. However, in the occurrence of any further seizures, obtaining the studies remains an option. All medication side effects were discussed and questions answered. Patient to follow up in a year or sooner if symptoms worsen. Christie Ramos MD I would like to thank you for the consult. Please do not hesitate if you have any questions about the patient care. This note is created with the assistance of a speech-recognition program. While intending to generate a document that actually reflects the content of the visit, the document can still have some errors including those of syntax and sound a- like substitutions which may escape proofreading. In such instances, actual meaning can be extrapolated by contextual derivation.     Scribe Attestation:   By signing my name below, Donavan Pinzon, attest that this documentation has been prepared under the direction and in the presence of Latricia Montes MD.    Electronically Signed: Michael Smith. 06/03/22. 11:55 AM

## 2022-06-14 DIAGNOSIS — J45.20 MILD INTERMITTENT REACTIVE AIRWAY DISEASE WITHOUT COMPLICATION: ICD-10-CM

## 2022-06-14 DIAGNOSIS — E87.6 HYPOKALEMIA: ICD-10-CM

## 2022-06-14 DIAGNOSIS — E55.9 VITAMIN D DEFICIENCY: ICD-10-CM

## 2022-06-15 NOTE — TELEPHONE ENCOUNTER
Request for Albuterol Inhaler. Next Visit Date:  No future appointments.     Health Maintenance   Topic Date Due    COVID-19 Vaccine (1) Never done    Hepatitis C screen  Never done    Pneumococcal 0-64 years Vaccine (2 - PCV) 01/16/2018    Cervical cancer screen  02/05/2021    Depression Screen  10/19/2022    Lipids  06/11/2025    DTaP/Tdap/Td vaccine (2 - Td or Tdap) 09/13/2026    Flu vaccine  Completed    HIV screen  Completed    Hepatitis A vaccine  Aged Out    Hepatitis B vaccine  Aged Out    Hib vaccine  Aged Out    Meningococcal (ACWY) vaccine  Aged Out    Varicella vaccine  Discontinued       No results found for: LABA1C          ( goal A1C is < 7)   No results found for: LABMICR  LDL Cholesterol (mg/dL)   Date Value   06/11/2020 97       (goal LDL is <100)   AST (U/L)   Date Value   01/12/2022 16     ALT (U/L)   Date Value   01/12/2022 18     BUN (mg/dL)   Date Value   01/12/2022 15     BP Readings from Last 3 Encounters:   06/03/22 119/83   01/12/22 (!) 142/95   01/08/22 (!) 140/84          (goal 120/80)    All Future Testing planned in [facility]  Lab Frequency Next Occurrence   Potassium, Urine, Random Once 07/07/2022   Sodium, Urine, Random Once 07/07/2022   Chloride, Urine, Random Once 07/07/2022   Osmolality, Urine Once 07/07/2022         Patient Active Problem List:     GERD (gastroesophageal reflux disease)     Mental retardation     Seizure ([facility] [address]     Irritable bowel syndrome without diarrhea     Intractable nausea and vomiting     Marijuana use     Moderate persistent asthma without complication     Gastroesophageal reflux disease with esophagitis without hemorrhage     Intractable vomiting

## 2022-06-15 NOTE — TELEPHONE ENCOUNTER
Request for vitamin D3 and Klorcon. Next Visit Date:  No future appointments.     Health Maintenance   Topic Date Due    COVID-19 Vaccine (1) Never done    Hepatitis C screen  Never done    Pneumococcal 0-64 years Vaccine (2 - PCV) 01/16/2018    Cervical cancer screen  02/05/2021    Depression Screen  10/19/2022    Lipids  06/11/2025    DTaP/Tdap/Td vaccine (2 - Td or Tdap) 09/13/2026    Flu vaccine  Completed    HIV screen  Completed    Hepatitis A vaccine  Aged Out    Hepatitis B vaccine  Aged Out    Hib vaccine  Aged Out    Meningococcal (ACWY) vaccine  Aged Out    Varicella vaccine  Discontinued       No results found for: LABA1C          ( goal A1C is < 7)   No results found for: LABMICR  LDL Cholesterol (mg/dL)   Date Value   06/11/2020 97       (goal LDL is <100)   AST (U/L)   Date Value   01/12/2022 16     ALT (U/L)   Date Value   01/12/2022 18     BUN (mg/dL)   Date Value   01/12/2022 15     BP Readings from Last 3 Encounters:   06/03/22 119/83   01/12/22 (!) 142/95   01/08/22 (!) 140/84          (goal 120/80)    All Future Testing planned in [facility]  Lab Frequency Next Occurrence   Potassium, Urine, Random Once 07/07/2022   Sodium, Urine, Random Once 07/07/2022   Chloride, Urine, Random Once 07/07/2022   Osmolality, Urine Once 07/07/2022         Patient Active Problem List:     GERD (gastroesophageal reflux disease)     Mental retardation     Seizure ([facility] [address]     Irritable bowel syndrome without diarrhea     Intractable nausea and vomiting     Marijuana use     Moderate persistent asthma without complication     Gastroesophageal reflux disease with esophagitis without hemorrhage     Intractable vomiting

## 2022-06-16 RX ORDER — ALBUTEROL SULFATE 90 UG/1
AEROSOL, METERED RESPIRATORY (INHALATION)
Qty: 8.5 G | Refills: 3 | Status: SHIPPED | OUTPATIENT
Start: 2022-06-16 | End: 2022-10-11 | Stop reason: SDUPTHER

## 2022-06-16 RX ORDER — POTASSIUM CHLORIDE 20 MEQ/1
20 TABLET, EXTENDED RELEASE ORAL DAILY
Qty: 30 TABLET | Refills: 2 | Status: SHIPPED | OUTPATIENT
Start: 2022-06-16 | End: 2022-10-11 | Stop reason: SDUPTHER

## 2022-06-16 RX ORDER — MELATONIN
Qty: 30 TABLET | Refills: 3 | Status: SHIPPED | OUTPATIENT
Start: 2022-06-16 | End: 2022-10-11 | Stop reason: SDUPTHER

## 2022-06-16 NOTE — TELEPHONE ENCOUNTER
Please request patient to get blood work done for [facility]. Tried calling patient over phone could not be reached.   Thank you

## 2022-09-15 DIAGNOSIS — R56.01 COMPLEX FEBRILE CONVULSION (HCC): ICD-10-CM

## 2022-09-16 NOTE — TELEPHONE ENCOUNTER
E-scribing request for levETIRAcetam . Pt has future appt.        Health Maintenance   Topic Date Due    COVID-19 Vaccine (1) Never done    Hepatitis C screen  Never done    Pneumococcal 0-64 years Vaccine (2 - PCV) 01/16/2018    Cervical cancer screen  02/05/2021    Flu vaccine (1) 09/01/2022    Depression Screen  10/19/2022    Lipids  06/11/2025    DTaP/Tdap/Td vaccine (2 - Td or Tdap) 09/13/2026    HIV screen  Completed    Hepatitis A vaccine  Aged Out    Hepatitis B vaccine  Aged Out    Hib vaccine  Aged Out    Meningococcal (ACWY) vaccine  Aged Out    Varicella vaccine  Discontinued             (applicable per patient's age: Cancer Screenings, Depression Screening, Fall Risk Screening, Immunizations)    LDL Cholesterol (mg/dL)   Date Value   06/11/2020 97     AST (U/L)   Date Value   01/12/2022 16     ALT (U/L)   Date Value   01/12/2022 18     BUN (mg/dL)   Date Value   01/12/2022 15      (goal A1C is < 7)   (goal LDL is <100) need 30-50% reduction from baseline     BP Readings from Last 3 Encounters:   06/03/22 119/83   01/12/22 (!) 142/95   01/08/22 (!) 140/84    (goal /80)      All Future Testing planned in [facility]:  Lab Frequency Next Occurrence   Potassium, Urine, Random Once 07/07/2022   Sodium, Urine, Random Once 07/07/2022   Chloride, Urine, Random Once 07/07/2022   Osmolality, Urine Once 16/09/2681   Basic Metabolic Panel Once 43/89/6883       Next Visit Date:  Future Appointments   Date Time Provider Rachel Adkins   10/11/2022 10:00 AM Balwinder Edward MD [facility] IM [email or web address]            Patient Active Problem List:     GERD (gastroesophageal reflux disease)     Mental retardation     Seizure ([facility] [address]     Irritable bowel syndrome without diarrhea     Intractable nausea and vomiting     Marijuana use     Moderate persistent asthma without complication     Gastroesophageal reflux disease with esophagitis without hemorrhage     Intractable vomiting

## 2022-09-18 RX ORDER — LEVETIRACETAM 750 MG/1
TABLET ORAL
Qty: 60 TABLET | Refills: 3 | Status: SHIPPED | OUTPATIENT
Start: 2022-09-18

## 2022-10-11 ENCOUNTER — OFFICE VISIT (OUTPATIENT)
Dept: INTERNAL MEDICINE | Age: 42
End: 2022-10-11
Payer: COMMERCIAL

## 2022-10-11 VITALS
HEART RATE: 74 BPM | SYSTOLIC BLOOD PRESSURE: 103 MMHG | DIASTOLIC BLOOD PRESSURE: 76 MMHG | WEIGHT: 102 LBS | BODY MASS INDEX: 18.77 KG/M2 | TEMPERATURE: 97.9 F | OXYGEN SATURATION: 97 % | HEIGHT: 62 IN

## 2022-10-11 DIAGNOSIS — K21.9 GASTROESOPHAGEAL REFLUX DISEASE WITHOUT ESOPHAGITIS: ICD-10-CM

## 2022-10-11 DIAGNOSIS — E87.6 HYPOKALEMIA: ICD-10-CM

## 2022-10-11 DIAGNOSIS — Z11.59 ENCOUNTER FOR HEPATITIS C SCREENING TEST FOR LOW RISK PATIENT: ICD-10-CM

## 2022-10-11 DIAGNOSIS — Z23 NEEDS FLU SHOT: ICD-10-CM

## 2022-10-11 DIAGNOSIS — R05.3 CHRONIC COUGH: ICD-10-CM

## 2022-10-11 DIAGNOSIS — E55.9 VITAMIN D DEFICIENCY: ICD-10-CM

## 2022-10-11 DIAGNOSIS — M62.838 MUSCLE SPASM: ICD-10-CM

## 2022-10-11 DIAGNOSIS — R11.2 INTRACTABLE NAUSEA AND VOMITING: ICD-10-CM

## 2022-10-11 DIAGNOSIS — J45.20 MILD INTERMITTENT REACTIVE AIRWAY DISEASE WITHOUT COMPLICATION: Primary | ICD-10-CM

## 2022-10-11 DIAGNOSIS — Q61.2 ADPKD (AUTOSOMAL DOMINANT POLYCYSTIC KIDNEY DISEASE): ICD-10-CM

## 2022-10-11 PROBLEM — F12.90 MARIJUANA USE: Status: RESOLVED | Noted: 2021-10-07 | Resolved: 2022-10-11

## 2022-10-11 PROCEDURE — G8482 FLU IMMUNIZE ORDER/ADMIN: HCPCS

## 2022-10-11 PROCEDURE — 90686 IIV4 VACC NO PRSV 0.5 ML IM: CPT | Performed by: STUDENT IN AN ORGANIZED HEALTH CARE EDUCATION/TRAINING PROGRAM

## 2022-10-11 PROCEDURE — 1036F TOBACCO NON-USER: CPT

## 2022-10-11 PROCEDURE — G8427 DOCREV CUR MEDS BY ELIG CLIN: HCPCS

## 2022-10-11 PROCEDURE — G8420 CALC BMI NORM PARAMETERS: HCPCS

## 2022-10-11 PROCEDURE — 99214 OFFICE O/P EST MOD 30 MIN: CPT

## 2022-10-11 PROCEDURE — 99211 OFF/OP EST MAY X REQ PHY/QHP: CPT | Performed by: STUDENT IN AN ORGANIZED HEALTH CARE EDUCATION/TRAINING PROGRAM

## 2022-10-11 RX ORDER — MELATONIN
Qty: 30 TABLET | Refills: 3 | Status: SHIPPED | OUTPATIENT
Start: 2022-10-11

## 2022-10-11 RX ORDER — ONDANSETRON 4 MG/1
4 TABLET, FILM COATED ORAL 3 TIMES DAILY PRN
Qty: 15 TABLET | Refills: 0 | Status: SHIPPED | OUTPATIENT
Start: 2022-10-11

## 2022-10-11 RX ORDER — PANTOPRAZOLE SODIUM 40 MG/1
40 TABLET, DELAYED RELEASE ORAL
Qty: 30 TABLET | Refills: 1 | Status: SHIPPED | OUTPATIENT
Start: 2022-10-11

## 2022-10-11 RX ORDER — FLUTICASONE PROPIONATE 220 UG/1
2 AEROSOL, METERED RESPIRATORY (INHALATION) 2 TIMES DAILY PRN
Qty: 12 G | Refills: 3 | Status: SHIPPED | OUTPATIENT
Start: 2022-10-11 | End: 2023-10-11

## 2022-10-11 RX ORDER — ALBUTEROL SULFATE 90 UG/1
AEROSOL, METERED RESPIRATORY (INHALATION)
Qty: 8.5 G | Refills: 3 | Status: SHIPPED | OUTPATIENT
Start: 2022-10-11

## 2022-10-11 RX ORDER — METOCLOPRAMIDE 5 MG/1
5 TABLET ORAL
Qty: 90 TABLET | Refills: 0 | Status: CANCELLED | OUTPATIENT
Start: 2022-10-11 | End: 2022-11-10

## 2022-10-11 RX ORDER — CYCLOBENZAPRINE HCL 5 MG
5 TABLET ORAL NIGHTLY PRN
Qty: 30 TABLET | Refills: 0 | Status: CANCELLED | OUTPATIENT
Start: 2022-10-11 | End: 2022-10-21

## 2022-10-11 RX ORDER — POTASSIUM CHLORIDE 20 MEQ/1
20 TABLET, EXTENDED RELEASE ORAL DAILY
Qty: 30 TABLET | Refills: 2 | Status: SHIPPED | OUTPATIENT
Start: 2022-10-11

## 2022-10-11 ASSESSMENT — PATIENT HEALTH QUESTIONNAIRE - PHQ9
2. FEELING DOWN, DEPRESSED OR HOPELESS: 0
SUM OF ALL RESPONSES TO PHQ QUESTIONS 1-9: 0
SUM OF ALL RESPONSES TO PHQ9 QUESTIONS 1 & 2: 0
SUM OF ALL RESPONSES TO PHQ QUESTIONS 1-9: 0
SUM OF ALL RESPONSES TO PHQ QUESTIONS 1-9: 0
1. LITTLE INTEREST OR PLEASURE IN DOING THINGS: 0
SUM OF ALL RESPONSES TO PHQ QUESTIONS 1-9: 0

## 2022-10-11 ASSESSMENT — ENCOUNTER SYMPTOMS
RHINORRHEA: 0
ABDOMINAL PAIN: 0
COUGH: 1
NAUSEA: 1
BACK PAIN: 0
VOMITING: 0
CONSTIPATION: 0
SHORTNESS OF BREATH: 0
SORE THROAT: 0
DIARRHEA: 0
WHEEZING: 0
BLOOD IN STOOL: 0
PHOTOPHOBIA: 0
EYE DISCHARGE: 0
EYE REDNESS: 0

## 2022-10-11 NOTE — PROGRESS NOTES
Internal Medicine Clinic New Patient Note    Date of patient's visit: 10/11/2022  Name:  Sharif Fraser  Primary Care Physician: Dilia Chan MD    Reason for visit: First Visit, establish care     HISTORY OF PRESENTING ILLNESS:    History was obtained from the patient. Sharif Fraser is a 38 y.o. is here to establish care. Former PCP is Dr. Marissa Govea. Last seen in 10/19/2021. Patient presents with a chief complaint of nausea that she has been experiencing from an year. She has a history of intractable nausea and vomiting secondary to marijuana use and currently reports that she has quit using marijuana. She also complains of heartburn, cough and muscle cramps in her hands and legs. Patient states that she ran out of her medications and requests refills. Patient currently denies smoking cigarettes, alcohol use or marijuana use. Patient has a history of seizure disorder and is currently on levetiracetam.  She has followed up with neurology on 6/3/2022. Patient also has a history of GERD for which she took Protonix but is currently out of her medication. Patient also has asthma and is out of her inhaler. She has a history of hypokalemia and currently BMP has been ordered. Health maintenance:  She is due for Pap smear and wants it to be scheduled in her next visit. Due for COVID-19 booster dose.     PAST MEDICAL HISTORY:          Diagnosis Date    GERD (gastroesophageal reflux disease)     Intractable nausea and vomiting 10/7/2021    Irritable bowel syndrome without diarrhea 2016    Moderate persistent asthma without complication     Moderate persistent asthma without complication     Seizures ([facility] [address]     2013, 14       PAST SURGICAL HISTORY:          Procedure Laterality Date     SECTION      2004    TUBAL LIGATION          UPPER GASTROINTESTINAL ENDOSCOPY N/A 10/9/2021    EGD BIOPSY performed by Annie Olivarez MD at [address]:      Allergies   Allergen Reactions    Omeprazole      Pt had a seizure         HOME MEDICATION:      Current Outpatient Medications on File Prior to Visit   Medication Sig Dispense Refill    levETIRAcetam (KEPPRA) 750 MG tablet TAKE ONE TABLET BY MOUTH TWICE A DAY 60 tablet 3    dicyclomine (BENTYL) 20 MG tablet Take 1 tablet by mouth 3 times daily as needed (abd cramping) (Patient not taking: No sig reported) 20 tablet 0    metoclopramide (REGLAN) 5 MG tablet Take 1 tablet by mouth 3 times daily (with meals) (Patient not taking: Reported on 6/3/2022) 90 tablet 0    famotidine (PEPCID) 20 MG tablet Take 1 tablet by mouth 2 times daily (Patient not taking: No sig reported) 20 tablet 0     No current facility-administered medications on file prior to visit. SOCIAL HISTORY:    TOBACCO:   reports that she quit smoking about 8 years ago. Her smoking use included cigarettes. She started smoking about 18 years ago. She has a 10.00 pack-year smoking history. She has never used smokeless tobacco.  ETOH:   reports no history of alcohol use. DRUGS:  reports current drug use. Drug: Marijuana Yoko Boyle). OCCUPATION:      FAMILY HISTORY:          Problem Relation Age of Onset    High Cholesterol Mother     Birth Defects Neg Hx     Diabetes Neg Hx        REVIEW OF SYSTEMS:    Review of Systems   Constitutional:  Negative for appetite change, chills, fatigue, fever and unexpected weight change. HENT:  Negative for congestion, rhinorrhea, sneezing and sore throat. Eyes:  Negative for photophobia, discharge, redness and visual disturbance. Respiratory:  Positive for cough. Negative for shortness of breath and wheezing. Cardiovascular:  Negative for chest pain, palpitations and leg swelling. Gastrointestinal:  Positive for nausea. Negative for abdominal pain, blood in stool, constipation, diarrhea and vomiting. Endocrine: Negative for cold intolerance and heat intolerance.    Genitourinary:  Negative for dysuria, frequency, hematuria and urgency. Musculoskeletal:  Negative for arthralgias, back pain, joint swelling and neck pain. Skin:  Negative for pallor, rash and wound. Allergic/Immunologic: Negative for environmental allergies and food allergies. Neurological:  Negative for dizziness, seizures, weakness, light-headedness, numbness and headaches. Hematological:  Negative for adenopathy. Does not bruise/bleed easily. Psychiatric/Behavioral:  Negative for agitation, confusion, hallucinations and sleep disturbance. The patient is not nervous/anxious. PHYSICAL EXAM:      Vitals:    10/11/22 1021   BP: 103/76   Pulse: 74   Temp: 97.9 °F (36.6 °C)   SpO2: 97%      Physician Exam:     General Examination    General Resting comfortably in bed   Head Normocephalic, without obvious abnormality   Neck Supple, symmetrical. Good ROM. No midline or paraspinal tenderness. Lungs Respirations unlabored, no wheezing   Chest Wall No deformity   Heart RRR, no murmur   Abdomen Soft. Non-tender, non-distended   Extremities No cyanosis or edema or warmth.    Pulses 2+ and symmetric   Skin: Skin  turgor normal, no rashes or lesions       LABORATORY FINDINGS:    CBC:   Lab Results   Component Value Date/Time    WBC 5.0 01/12/2022 10:38 AM    HGB 14.2 01/12/2022 10:38 AM     01/12/2022 10:38 AM     03/21/2012 12:39 PM     BMP:    Lab Results   Component Value Date/Time     01/12/2022 10:38 AM    K 4.7 03/01/2022 01:13 PM    CL 99 01/12/2022 10:38 AM    CO2 21 01/12/2022 10:38 AM    BUN 15 01/12/2022 10:38 AM    CREATININE 0.82 01/12/2022 10:38 AM    GLUCOSE 121 01/12/2022 10:38 AM    GLUCOSE 118 03/21/2012 12:39 PM     Hemoglobin A1C: No results found for: LABA1C  Lipid profile:   Lab Results   Component Value Date/Time    CHOL 210 06/11/2020 04:22 PM    TRIG 109 06/11/2020 04:22 PM    HDL 91 06/11/2020 04:22 PM     Thyroid functions:   Lab Results   Component Value Date/Time    TSH 1.45 03/03/2014 02:33 PM      Hepatic functions: Lab Results   Component Value Date/Time    ALT 18 01/12/2022 10:38 AM    AST 16 01/12/2022 10:38 AM    PROT 7.6 01/12/2022 10:38 AM    BILITOT 0.47 01/12/2022 10:38 AM    BILIDIR 0.08 10/07/2021 03:45 PM    LABALBU 4.5 01/12/2022 10:38 AM       Any additional findings:    Assessment and Plan:   1. Needs flu shot  - Influenza, AFLURIA, (age 1 y+), IM, Preservative Free, 0.5 mL  - TN IMMUNIZ ADMIN,1 SINGLE/COMB VAC/TOXOID    2. Mild intermittent reactive airway disease without complication  - albuterol sulfate HFA (PROVENTIL;VENTOLIN;PROAIR) 108 (90 Base) MCG/ACT inhaler; INHALE TWO PUFFS BY MOUTH EVERY 6 HOURS AS NEEDED FOR WHEEZING  Dispense: 8.5 g; Refill: 3  - fluticasone (FLOVENT HFA) 220 MCG/ACT inhaler; Inhale 2 puffs into the lungs 2 times daily as needed (Cough, Wheezing)  Dispense: 12 g; Refill: 3  - Full PFT Study With Bronchodilator; Future    3. Vitamin D deficiency  - vitamin D3 (CHOLECALCIFEROL) 25 MCG (1000 UT) TABS tablet; TAKE ONE TABLET BY MOUTH DAILY  Dispense: 30 tablet; Refill: 3    4. Hypokalemia  - potassium chloride (KLOR-CON M) 20 MEQ extended release tablet; Take 1 tablet by mouth daily  Dispense: 30 tablet; Refill: 2  - Basic Metabolic Panel; Future  - Sodium, Urine, Random; Future  - Chloride, Urine, Random; Future  - Potassium, Urine, Random; Future  - Osmolality, Urine; Future    5. Gastroesophageal reflux disease without esophagitis  - pantoprazole (PROTONIX) 40 MG tablet; Take 1 tablet by mouth every morning (before breakfast)  Dispense: 30 tablet; Refill: 1    6. Intractable nausea and vomiting  - ondansetron (ZOFRAN) 4 MG tablet; Take 1 tablet by mouth 3 times daily as needed for Nausea or Vomiting  Dispense: 15 tablet; Refill: 0    7. Muscle spasm  - Basic Metabolic Panel; Future    8. Encounter for hepatitis C screening test for low risk patient  - Hepatitis C Antibody; Future    9.  Chronic cough  - albuterol sulfate HFA (PROVENTIL;VENTOLIN;PROAIR) 108 (90 Base) MCG/ACT inhaler; INHALE TWO PUFFS BY MOUTH EVERY 6 HOURS AS NEEDED FOR WHEEZING  Dispense: 8.5 g; Refill: 3  - fluticasone (FLOVENT HFA) 220 MCG/ACT inhaler; Inhale 2 puffs into the lungs 2 times daily as needed (Cough, Wheezing)  Dispense: 12 g; Refill: 3  - Full PFT Study With Bronchodilator; Future    10. ADPKD (autosomal dominant polycystic kidney disease)  - [facility] - Ac Márquez MD, Nephrology, Yi    Vaccines:  She got both her Covid shots     Screening:  Due for Pap smear this year in 1 month    Follow-up:   Follow up in 4 weeks for pap smear. [place] received counseling on the following healthy behaviors: medication compliance, diet and exercise    Patient given educational materials. Was a self-tracking handout given in paper form or via [employer]? Yes    Discussed use, benefit, and side effects of prescribed medications. Barriers to medication compliance addressed. All patient questions answered. Pt voiced understanding.      Dima Freeman MD  Internal Medicine Resident  [address] [facility]  10/11/2022 12:33 PM

## 2022-11-09 ENCOUNTER — HOSPITAL ENCOUNTER (EMERGENCY)
Age: 42
Discharge: HOME | End: 2022-11-09
Attending: EMERGENCY MEDICINE
Payer: COMMERCIAL

## 2022-11-09 ENCOUNTER — APPOINTMENT (OUTPATIENT)
Dept: CT IMAGING | Age: 42
End: 2022-11-09
Payer: COMMERCIAL

## 2022-11-09 VITALS
RESPIRATION RATE: 19 BRPM | SYSTOLIC BLOOD PRESSURE: 146 MMHG | HEIGHT: 62 IN | TEMPERATURE: 98.1 F | HEART RATE: 53 BPM | BODY MASS INDEX: 19.14 KG/M2 | OXYGEN SATURATION: 99 % | WEIGHT: 104 LBS | DIASTOLIC BLOOD PRESSURE: 93 MMHG

## 2022-11-09 DIAGNOSIS — R19.7 DIARRHEA OF PRESUMED INFECTIOUS ORIGIN: ICD-10-CM

## 2022-11-09 DIAGNOSIS — R11.2 NAUSEA AND VOMITING, UNSPECIFIED VOMITING TYPE: Primary | ICD-10-CM

## 2022-11-09 LAB
ABSOLUTE EOS #: 0.18 K/UL (ref 0–0.44)
ABSOLUTE IMMATURE GRANULOCYTE: 0.02 K/UL (ref 0–0.3)
ABSOLUTE LYMPH #: 2.1 K/UL (ref 1.1–3.7)
ABSOLUTE MONO #: 0.33 K/UL (ref 0.1–1.2)
ALBUMIN SERPL-MCNC: 4.3 G/DL (ref 3.5–5.2)
ALP BLD-CCNC: 46 U/L (ref 35–104)
ALT SERPL-CCNC: 11 U/L (ref 5–33)
ANION GAP SERPL CALCULATED.3IONS-SCNC: 10 MMOL/L (ref 9–17)
AST SERPL-CCNC: 18 U/L
BASOPHILS # BLD: 1 % (ref 0–2)
BASOPHILS ABSOLUTE: 0.06 K/UL (ref 0–0.2)
BILIRUB SERPL-MCNC: 0.3 MG/DL (ref 0.3–1.2)
BUN BLDV-MCNC: 8 MG/DL (ref 6–20)
BUN/CREAT BLD: 10 (ref 9–20)
CALCIUM SERPL-MCNC: 9 MG/DL (ref 8.6–10.4)
CHLORIDE BLD-SCNC: 104 MMOL/L (ref 98–107)
CO2: 26 MMOL/L (ref 20–31)
CREAT SERPL-MCNC: 0.84 MG/DL (ref 0.5–0.9)
EOSINOPHILS RELATIVE PERCENT: 3 % (ref 1–4)
GFR SERPL CREATININE-BSD FRML MDRD: >60 ML/MIN/1.73M2
GLUCOSE BLD-MCNC: 112 MG/DL (ref 70–99)
HCT VFR BLD CALC: 40.2 % (ref 36.3–47.1)
HEMOGLOBIN: 14 G/DL (ref 11.9–15.1)
IMMATURE GRANULOCYTES: 0 %
LACTIC ACID: 1.4 MMOL/L (ref 0.5–2.2)
LIPASE: 17 U/L (ref 13–60)
LYMPHOCYTES # BLD: 33 % (ref 24–43)
MCH RBC QN AUTO: 30.7 PG (ref 25.2–33.5)
MCHC RBC AUTO-ENTMCNC: 34.8 G/DL (ref 28.4–34.8)
MCV RBC AUTO: 88.2 FL (ref 82.6–102.9)
MONOCYTES # BLD: 5 % (ref 3–12)
PDW BLD-RTO: 12.2 % (ref 11.8–14.4)
PLATELET # BLD: 362 K/UL (ref 138–453)
PMV BLD AUTO: 9.8 FL (ref 8.1–13.5)
POTASSIUM SERPL-SCNC: 3.6 MMOL/L (ref 3.7–5.3)
PREGNANCY TEST URINE, POC: NEGATIVE
RBC # BLD: 4.56 M/UL (ref 3.95–5.11)
SEG NEUTROPHILS: 58 % (ref 36–65)
SEGMENTED NEUTROPHILS ABSOLUTE COUNT: 3.77 K/UL (ref 1.5–8.1)
SODIUM BLD-SCNC: 140 MMOL/L (ref 135–144)
TOTAL PROTEIN: 6.8 G/DL (ref 6.4–8.3)
WBC # BLD: 6.5 K/UL (ref 3.5–11.3)

## 2022-11-09 PROCEDURE — 96374 THER/PROPH/DIAG INJ IV PUSH: CPT

## 2022-11-09 PROCEDURE — 83690 ASSAY OF LIPASE: CPT

## 2022-11-09 PROCEDURE — 83605 ASSAY OF LACTIC ACID: CPT

## 2022-11-09 PROCEDURE — 80053 COMPREHEN METABOLIC PANEL: CPT

## 2022-11-09 PROCEDURE — 2580000003 HC RX 258: Performed by: EMERGENCY MEDICINE

## 2022-11-09 PROCEDURE — A4216 STERILE WATER/SALINE, 10 ML: HCPCS | Performed by: EMERGENCY MEDICINE

## 2022-11-09 PROCEDURE — 81025 URINE PREGNANCY TEST: CPT

## 2022-11-09 PROCEDURE — 99285 EMERGENCY DEPT VISIT HI MDM: CPT

## 2022-11-09 PROCEDURE — 85025 COMPLETE CBC W/AUTO DIFF WBC: CPT

## 2022-11-09 PROCEDURE — 2500000003 HC RX 250 WO HCPCS: Performed by: EMERGENCY MEDICINE

## 2022-11-09 PROCEDURE — 6360000004 HC RX CONTRAST MEDICATION: Performed by: EMERGENCY MEDICINE

## 2022-11-09 PROCEDURE — 74177 CT ABD & PELVIS W/CONTRAST: CPT

## 2022-11-09 PROCEDURE — 6360000002 HC RX W HCPCS: Performed by: EMERGENCY MEDICINE

## 2022-11-09 PROCEDURE — 96375 TX/PRO/DX INJ NEW DRUG ADDON: CPT

## 2022-11-09 RX ORDER — DICYCLOMINE HYDROCHLORIDE 10 MG/1
20 CAPSULE ORAL
Qty: 12 CAPSULE | Refills: 0 | Status: SHIPPED | OUTPATIENT
Start: 2022-11-09

## 2022-11-09 RX ORDER — MORPHINE SULFATE 4 MG/ML
4 INJECTION, SOLUTION INTRAMUSCULAR; INTRAVENOUS ONCE
Status: COMPLETED | OUTPATIENT
Start: 2022-11-09 | End: 2022-11-09

## 2022-11-09 RX ORDER — SODIUM CHLORIDE 0.9 % (FLUSH) 0.9 %
10 SYRINGE (ML) INJECTION PRN
Status: DISCONTINUED | OUTPATIENT
Start: 2022-11-09 | End: 2022-11-09 | Stop reason: HOSPADM

## 2022-11-09 RX ORDER — ONDANSETRON 2 MG/ML
4 INJECTION INTRAMUSCULAR; INTRAVENOUS ONCE
Status: COMPLETED | OUTPATIENT
Start: 2022-11-09 | End: 2022-11-09

## 2022-11-09 RX ORDER — 0.9 % SODIUM CHLORIDE 0.9 %
1000 INTRAVENOUS SOLUTION INTRAVENOUS ONCE
Status: COMPLETED | OUTPATIENT
Start: 2022-11-09 | End: 2022-11-09

## 2022-11-09 RX ORDER — PROCHLORPERAZINE EDISYLATE 5 MG/ML
10 INJECTION INTRAMUSCULAR; INTRAVENOUS EVERY 6 HOURS PRN
Status: DISCONTINUED | OUTPATIENT
Start: 2022-11-09 | End: 2022-11-09 | Stop reason: HOSPADM

## 2022-11-09 RX ORDER — 0.9 % SODIUM CHLORIDE 0.9 %
80 INTRAVENOUS SOLUTION INTRAVENOUS ONCE
Status: COMPLETED | OUTPATIENT
Start: 2022-11-09 | End: 2022-11-09

## 2022-11-09 RX ORDER — KETOROLAC TROMETHAMINE 15 MG/ML
15 INJECTION, SOLUTION INTRAMUSCULAR; INTRAVENOUS ONCE
Status: COMPLETED | OUTPATIENT
Start: 2022-11-09 | End: 2022-11-09

## 2022-11-09 RX ORDER — ONDANSETRON 4 MG/1
4 TABLET, ORALLY DISINTEGRATING ORAL EVERY 8 HOURS PRN
Qty: 12 TABLET | Refills: 0 | Status: SHIPPED | OUTPATIENT
Start: 2022-11-09

## 2022-11-09 RX ORDER — DIPHENHYDRAMINE HYDROCHLORIDE 50 MG/ML
25 INJECTION INTRAMUSCULAR; INTRAVENOUS ONCE
Status: COMPLETED | OUTPATIENT
Start: 2022-11-09 | End: 2022-11-09

## 2022-11-09 RX ADMIN — DIPHENHYDRAMINE HYDROCHLORIDE 25 MG: 50 INJECTION, SOLUTION INTRAMUSCULAR; INTRAVENOUS at 11:02

## 2022-11-09 RX ADMIN — IOPAMIDOL 75 ML: 755 INJECTION, SOLUTION INTRAVENOUS at 09:09

## 2022-11-09 RX ADMIN — KETOROLAC TROMETHAMINE 15 MG: 15 INJECTION, SOLUTION INTRAMUSCULAR; INTRAVENOUS at 11:02

## 2022-11-09 RX ADMIN — FAMOTIDINE 20 MG: 10 INJECTION, SOLUTION INTRAVENOUS at 08:10

## 2022-11-09 RX ADMIN — PROCHLORPERAZINE EDISYLATE 10 MG: 5 INJECTION INTRAMUSCULAR; INTRAVENOUS at 11:02

## 2022-11-09 RX ADMIN — SODIUM CHLORIDE 1000 ML: 9 INJECTION, SOLUTION INTRAVENOUS at 08:09

## 2022-11-09 RX ADMIN — SODIUM CHLORIDE, PRESERVATIVE FREE 10 ML: 5 INJECTION INTRAVENOUS at 09:17

## 2022-11-09 RX ADMIN — SODIUM CHLORIDE, PRESERVATIVE FREE 10 ML: 5 INJECTION INTRAVENOUS at 09:09

## 2022-11-09 RX ADMIN — MORPHINE SULFATE 4 MG: 4 INJECTION, SOLUTION INTRAMUSCULAR; INTRAVENOUS at 08:10

## 2022-11-09 RX ADMIN — SODIUM CHLORIDE 48 ML: 9 INJECTION, SOLUTION INTRAVENOUS at 09:16

## 2022-11-09 RX ADMIN — ONDANSETRON 4 MG: 2 INJECTION INTRAMUSCULAR; INTRAVENOUS at 08:08

## 2022-11-09 ASSESSMENT — ENCOUNTER SYMPTOMS
EYES NEGATIVE: 1
SHORTNESS OF BREATH: 0
CONSTIPATION: 0
DIARRHEA: 1
SINUS PAIN: 0
NAUSEA: 1
COLOR CHANGE: 0
VOMITING: 1
CHEST TIGHTNESS: 0
ABDOMINAL DISTENTION: 0
ABDOMINAL PAIN: 1
APNEA: 0

## 2022-11-09 ASSESSMENT — PAIN SCALES - GENERAL
PAINLEVEL_OUTOF10: 9
PAINLEVEL_OUTOF10: 10
PAINLEVEL_OUTOF10: 10

## 2022-11-09 ASSESSMENT — PAIN - FUNCTIONAL ASSESSMENT: PAIN_FUNCTIONAL_ASSESSMENT: 0-10

## 2022-11-09 NOTE — ED PROVIDER NOTES
EMERGENCY DEPARTMENT ENCOUNTER    Pt Name: Timur Zamora  MRN: [id number]  [place] 1980  Date of evaluation: 22  CHIEF COMPLAINT       Chief Complaint   Patient presents with    Abdominal Pain    Emesis     HISTORY OF PRESENT ILLNESS   44-year-old female presents emergency room for abdominal discomfort nausea and vomiting. Patient does have associated diarrhea. Symptoms have been going on for 5 days. Patient reports history of acid reflux. She gets episodes where she gets nausea and vomiting. She reports symptoms today seem to be more severe than her usual symptoms. Diarrhea is not usual for her. Patient reports previous  no other abdominal surgeries. REVIEW OF SYSTEMS     Review of Systems   Constitutional:  Positive for activity change and appetite change. Negative for chills and diaphoresis. HENT:  Negative for congestion, sinus pain and tinnitus. Eyes: Negative. Respiratory:  Negative for apnea, chest tightness and shortness of breath. Gastrointestinal:  Positive for abdominal pain, diarrhea, nausea and vomiting. Negative for abdominal distention and constipation. Genitourinary:  Negative for difficulty urinating and frequency. Musculoskeletal:  Negative for arthralgias and myalgias. Skin:  Negative for color change and rash. Neurological:  Positive for weakness. Negative for dizziness. Hematological: Negative. Psychiatric/Behavioral: Negative.        PASTMEDICAL HISTORY     Past Medical History:   Diagnosis Date    GERD (gastroesophageal reflux disease)     Intractable nausea and vomiting 10/7/2021    Irritable bowel syndrome without diarrhea 2016    Moderate persistent asthma without complication     Moderate persistent asthma without complication     [facility]     2013, 14     Past Problem List  Patient Active Problem List   Diagnosis Code    GERD (gastroesophageal reflux disease) K21.9    Mental retardation F79    History of seizure disorder Z86.69    Moderate persistent asthma without complication T91.39    Gastroesophageal reflux disease with esophagitis without hemorrhage K21.00    Intractable vomiting R11.10     SURGICAL HISTORY       Past Surgical History:   Procedure Laterality Date     SECTION          TUBAL LIGATION          UPPER GASTROINTESTINAL ENDOSCOPY N/A 10/9/2021    EGD BIOPSY performed by Shayne Lu MD at [facility]       Previous Medications    ALBUTEROL SULFATE HFA (PROVENTIL;VENTOLIN;PROAIR) 108 (90 BASE) MCG/ACT INHALER    INHALE TWO PUFFS BY MOUTH EVERY 6 HOURS AS NEEDED FOR WHEEZING    FAMOTIDINE (PEPCID) 20 MG TABLET    Take 1 tablet by mouth 2 times daily    FLUTICASONE (FLOVENT HFA) 220 MCG/ACT INHALER    Inhale 2 puffs into the lungs 2 times daily as needed (Cough, Wheezing)    LEVETIRACETAM (KEPPRA) 750 MG TABLET    TAKE ONE TABLET BY MOUTH TWICE A DAY    METOCLOPRAMIDE (REGLAN) 5 MG TABLET    Take 1 tablet by mouth 3 times daily (with meals)    ONDANSETRON (ZOFRAN) 4 MG TABLET    Take 1 tablet by mouth 3 times daily as needed for Nausea or Vomiting    PANTOPRAZOLE (PROTONIX) 40 MG TABLET    Take 1 tablet by mouth every morning (before breakfast)    POTASSIUM CHLORIDE (KLOR-CON M) 20 MEQ EXTENDED RELEASE TABLET    Take 1 tablet by mouth daily    VITAMIN D3 (CHOLECALCIFEROL) 25 MCG (1000 UT) TABS TABLET    TAKE ONE TABLET BY MOUTH DAILY     ALLERGIES     is allergic to omeprazole. FAMILY HISTORY     She indicated that her mother is alive. She indicated that her father is alive. She indicated that the status of her neg hx is unknown.      SOCIAL HISTORY       Social History     Tobacco Use    Smoking status: Former     Packs/day: 1.00     Years: 10.00     Pack years: 10.00     Types: Cigarettes     Start date: 10/19/2003     Quit date: 3/7/2014     Years since quittin.6    Smokeless tobacco: Never    Tobacco comments:     denies current use    Substance Use Topics    Alcohol use: No    Drug use: Yes     Types: Marijuana Dietra Due)     Comment: used 10-15 years ago     PHYSICAL EXAM     INITIAL VITALS: BP (!) 146/93   Pulse 53   Temp 98.1 °F (36.7 °C) (Oral)   Resp 19   Ht 5' 2\" (1.575 m)   Wt 104 lb (47.2 kg)   LMP 2022   SpO2 99%   BMI 19.02 kg/m²    Physical Exam  Constitutional:       General: She is not in acute distress. Appearance: She is well-developed. HENT:      Head: Normocephalic. Eyes:      Pupils: Pupils are equal, round, and reactive to light. Cardiovascular:      Rate and Rhythm: Normal rate and regular rhythm. Heart sounds: Normal heart sounds. Pulmonary:      Effort: Pulmonary effort is normal. No respiratory distress. Breath sounds: Normal breath sounds. Abdominal:      General: Bowel sounds are normal.      Palpations: Abdomen is soft. Tenderness: There is generalized abdominal tenderness. Musculoskeletal:         General: Normal range of motion. Skin:     General: Skin is warm and dry. Neurological:      Mental Status: She is alert and oriented to person, place, and time. MEDICAL DECISION MAKIN-year-old female presented emergency room for nausea vomiting diarrhea and abdominal discomfort. Patient does have some voluntary guarding otherwise and overall benign abdominal exam.  CT abdomen pelvis is negative for acute intra-abdominal pathology. Electrolytes white blood cell count and lipase are within acceptable limits. Patient had some additional clear vomitus. We discussed management options for home and return precautions. At this time symptoms are presumed to be infectious.     CRITICAL CARE:       PROCEDURES:    Procedures    DIAGNOSTIC RESULTS   EKG:All EKG's are interpreted by the Emergency Department Physician who either signs or Co-signs this chart in the absence of a cardiologist.        RADIOLOGY:All plain film, CT, MRI, and formal ultrasound images (except ED bedside ultrasound) are read by the radiologist, see reports below, unless otherwisenoted in MDM or here. CT ABDOMEN PELVIS W IV CONTRAST Additional Contrast? None   Final Result   1. Nonspecific periportal edema, which may be related to hydration therapy. 2.  Punctate bilateral renal calculi and cystic kidneys again demonstrated. LABS: All lab results were reviewed by myself, and all abnormals are listed below. Labs Reviewed   COMPREHENSIVE METABOLIC PANEL - Abnormal; Notable for the following components:       Result Value    Glucose 112 (*)     Potassium 3.6 (*)     All other components within normal limits   POCT URINE PREGNANCY - Normal   CBC WITH AUTO DIFFERENTIAL   LIPASE   LACTIC ACID       EMERGENCY DEPARTMENTCOURSE:         Vitals:    Vitals:    11/09/22 0732   BP: (!) 146/93   Pulse: 53   Resp: 19   Temp: 98.1 °F (36.7 °C)   TempSrc: Oral   SpO2: 99%   Weight: 104 lb (47.2 kg)   Height: 5' 2\" (1.575 m)       The patient was given the following medications while in the emergency department:  Orders Placed This Encounter   Medications    0.9 % sodium chloride bolus    ondansetron (ZOFRAN) injection 4 mg    morphine sulfate (PF) injection 4 mg    famotidine (PEPCID) 20 mg in sodium chloride (PF) 0.9 % 10 mL injection    sodium chloride flush 0.9 % injection 10 mL    iopamidol (ISOVUE-370) 76 % injection 75 mL    0.9 % sodium chloride bolus    prochlorperazine (COMPAZINE) injection 10 mg    diphenhydrAMINE (BENADRYL) injection 25 mg    ketorolac (TORADOL) injection 15 mg    ondansetron (ZOFRAN ODT) 4 MG disintegrating tablet     Sig: Take 1 tablet by mouth every 8 hours as needed for Nausea or Vomiting     Dispense:  12 tablet     Refill:  0    dicyclomine (BENTYL) 10 MG capsule     Sig: Take 2 capsules by mouth 4 times daily (before meals and nightly)     Dispense:  12 capsule     Refill:  0     CONSULTS:  None    FINAL IMPRESSION      1. Nausea and vomiting, unspecified vomiting type    2.  Diarrhea of presumed infectious origin          DISPOSITION/PLAN   DISPOSITION Decision To Discharge 11/09/2022 11:50:05 AM      PATIENT REFERRED TO:  Ricardo Florian MD  [address] [address]  [phone]    Schedule an appointment as soon as possible for a visit in 1 week    DISCHARGE MEDICATIONS:  New Prescriptions    DICYCLOMINE (BENTYL) 10 MG CAPSULE    Take 2 capsules by mouth 4 times daily (before meals and nightly)    ONDANSETRON (ZOFRAN ODT) 4 MG DISINTEGRATING TABLET    Take 1 tablet by mouth every 8 hours as needed for Nausea or Vomiting     Tamia Torres MD  Attending Emergency Physician      Care during this encounter was due to an unprecedented national emergency due to COVID-19.              Bárbara Ramesh MD  11/09/22 2593

## 2022-11-09 NOTE — DISCHARGE INSTRUCTIONS
I recommend mostly clears today and tomorrow. If you are able to tolerate you can try some light crackers or soup tomorrow. Please return to the emergency room if symptoms are not starting to improve over the next 2 days. You can take the Zofran as needed for nausea Bentyl can help with abdominal cramping and pain.

## 2022-11-09 NOTE — ED NOTES
This RN with 2 unsuccessful IV attempts, other RN at bedside for access.         Sandra Ames, KYLIE  11/09/22 1015

## 2022-11-10 LAB — HCG, PREGNANCY URINE (POC): NEGATIVE

## 2022-12-12 ENCOUNTER — HOSPITAL ENCOUNTER (OUTPATIENT)
Age: 42
Setting detail: SPECIMEN
Discharge: HOME | End: 2022-12-12
Payer: COMMERCIAL

## 2022-12-12 DIAGNOSIS — E87.6 HYPOKALEMIA: ICD-10-CM

## 2022-12-12 DIAGNOSIS — Z11.59 ENCOUNTER FOR HEPATITIS C SCREENING TEST FOR LOW RISK PATIENT: ICD-10-CM

## 2022-12-12 DIAGNOSIS — M62.838 MUSCLE SPASM: ICD-10-CM

## 2022-12-12 LAB
ANION GAP SERPL CALCULATED.3IONS-SCNC: 15 MMOL/L (ref 9–17)
BUN BLDV-MCNC: 7 MG/DL (ref 6–20)
CALCIUM SERPL-MCNC: 9.8 MG/DL (ref 8.6–10.4)
CHLORIDE BLD-SCNC: 99 MMOL/L (ref 98–107)
CHLORIDE, UR: 122 MMOL/L
CO2: 23 MMOL/L (ref 20–31)
CREAT SERPL-MCNC: 0.96 MG/DL (ref 0.5–0.9)
GFR SERPL CREATININE-BSD FRML MDRD: >60 ML/MIN/1.73M2
GLUCOSE BLD-MCNC: 87 MG/DL (ref 70–99)
HEPATITIS C ANTIBODY: NONREACTIVE
OSMOLALITY URINE: 583 MOSM/KG (ref 80–1300)
POTASSIUM SERPL-SCNC: 4.1 MMOL/L (ref 3.7–5.3)
POTASSIUM, UR: 28.8 MMOL/L
SODIUM BLD-SCNC: 137 MMOL/L (ref 135–144)
SODIUM,UR: 130 MMOL/L

## 2022-12-12 PROCEDURE — 83935 ASSAY OF URINE OSMOLALITY: CPT

## 2022-12-12 PROCEDURE — 84133 ASSAY OF URINE POTASSIUM: CPT

## 2022-12-12 PROCEDURE — 86803 HEPATITIS C AB TEST: CPT

## 2022-12-12 PROCEDURE — 80048 BASIC METABOLIC PNL TOTAL CA: CPT

## 2022-12-12 PROCEDURE — 36415 COLL VENOUS BLD VENIPUNCTURE: CPT

## 2022-12-12 PROCEDURE — 82436 ASSAY OF URINE CHLORIDE: CPT

## 2022-12-12 PROCEDURE — 84300 ASSAY OF URINE SODIUM: CPT

## 2022-12-18 DIAGNOSIS — K21.9 GASTROESOPHAGEAL REFLUX DISEASE WITHOUT ESOPHAGITIS: ICD-10-CM

## 2022-12-19 RX ORDER — PANTOPRAZOLE SODIUM 40 MG/1
TABLET, DELAYED RELEASE ORAL
Qty: 30 TABLET | Refills: 1 | Status: SHIPPED | OUTPATIENT
Start: 2022-12-19

## 2022-12-19 NOTE — TELEPHONE ENCOUNTER
Protonix refill  Last seen 10/22     Health Maintenance   Topic Date Due    Cervical cancer screen  02/05/2021    COVID-19 Vaccine (3 - Booster for Moderna series) 08/28/2021    Depression Screen  10/11/2023    Lipids  06/11/2025    DTaP/Tdap/Td vaccine (2 - Td or Tdap) 09/13/2026    Flu vaccine  Completed    Pneumococcal 0-64 years Vaccine  Completed    Hepatitis C screen  Completed    HIV screen  Completed    Hepatitis A vaccine  Aged Out    Hib vaccine  Aged Out    Meningococcal (ACWY) vaccine  Aged Out    Varicella vaccine  Discontinued             (applicable per patient's age: Cancer Screenings, Depression Screening, Fall Risk Screening, Immunizations)    LDL Cholesterol (mg/dL)   Date Value   06/11/2020 97     AST (U/L)   Date Value   11/09/2022 18     ALT (U/L)   Date Value   11/09/2022 11     BUN (mg/dL)   Date Value   12/12/2022 7      (goal A1C is < 7)   (goal LDL is <100) need 30-50% reduction from baseline     BP Readings from Last 3 Encounters:   11/09/22 (!) 146/93   10/11/22 103/76   06/03/22 119/83    (goal /80)      All Future Testing planned in [facility]:  Lab Frequency Next Occurrence   Full PFT Study With Bronchodilator Once 10/11/2022       Next Visit Date:  Future Appointments   Date Time Provider Rachel Adkins   1/4/2023  1:00 PM STV PFT RM 1 [facility] PFT [facility]   1/24/2023 11:00 AM Jamal Brown MD [facility] IM [email or web address]            Patient Active Problem List:     GERD (gastroesophageal reflux disease)     Mental retardation     History of seizure disorder     Moderate persistent asthma without complication     Gastroesophageal reflux disease with esophagitis without hemorrhage     Intractable vomiting

## 2023-01-04 ENCOUNTER — HOSPITAL ENCOUNTER (OUTPATIENT)
Dept: PULMONOLOGY | Age: 43
Discharge: HOME | End: 2023-01-04
Payer: COMMERCIAL

## 2023-01-04 DIAGNOSIS — J45.20 MILD INTERMITTENT REACTIVE AIRWAY DISEASE WITHOUT COMPLICATION: ICD-10-CM

## 2023-01-04 DIAGNOSIS — R05.3 CHRONIC COUGH: ICD-10-CM

## 2023-01-04 PROCEDURE — 94729 DIFFUSING CAPACITY: CPT

## 2023-01-04 PROCEDURE — 94060 EVALUATION OF WHEEZING: CPT

## 2023-01-04 PROCEDURE — 94664 DEMO&/EVAL PT USE INHALER: CPT

## 2023-01-04 PROCEDURE — 94640 AIRWAY INHALATION TREATMENT: CPT

## 2023-01-04 PROCEDURE — 94726 PLETHYSMOGRAPHY LUNG VOLUMES: CPT

## 2023-01-05 NOTE — PROCEDURES
[address]                  [facility] [address]                               PULMONARY FUNCTION    PATIENT NAME: Sandie Fitzgerald                   :        1980  MED REC NO:   [id number]                             ROOM:  ACCOUNT NO:   [de-identified]                           ADMIT DATE: 2023  PROVIDER:     Matthew Wheatley    DATE OF PROCEDURE:  2023    Spirometry does not meet the criteria for obstruction and has an FEV1 of  2.73 liters and FVC of 3.44 liters. The post-bronchodilator study does not show any obstruction and no  change with medication with an FEV1 of 2.88 liters and FVC of 3.50  liters. Lung volumes show normal total lung capacity at 100% predicted. Diffusion capacity is mildly decreased at 73% predicted. Airway resistance is decreased at 66% predicted. Specific conductance is increased at 147% predicted. Flow-volume loop shows some element of obstructive ventilatory defect. The patient may have stage I COPD with an FEV1 of 2.88 liters and FVC of  3.50 liters associated emphysema. Clinical correlation recommended. Even though that there is lack of response to bronchodilators in the  test, a clinical improvement with bronchodilator therapy cannot be  excluded.         Hector Dan    D: 2023 16:27:22       T: 2023 16:29:48     [email or web address]  Job#: [id number]     Doc#: [id number]    CC:

## 2023-01-05 NOTE — PROCEDURES
[address] [facility] [address] [address]                               PULMONARY FUNCTION    PATIENT NAME: Alfonzo Cabot                   :        1980  MED REC NO:   [id number]                             ROOM:  ACCOUNT NO:   [de-identified]                           ADMIT DATE: 2023  PROVIDER:     Courtney Upton    DATE OF PROCEDURE:  2023    Spirometry does not show any obstructive ventilatory defect and has an  FEV1 of 2.73 liters and FVC of 3.44 liters. The post-bronchodilator study does not show significant change and has  an FEV1 of 2.88 liters and FVC of 3.50 liters. Lung volumes show total lung capacity that is 100% predicted. Diffusion capacity is 90% predicted. Airway resistance is decreased at 66% predicted. Specific conductance is increased at 147% predicted. Flow-volume loop is normal.    IMPRESSION:  Normal pulmonary function tests with an FEV1 of 2.88 liters  and FVC of 3.50 liters.         Heather Tanner    D: 2023 16:25:21       T: 2023 20:01:00     [email or web address]  Job#: [id number]     Doc#: [id number]    CC:

## 2023-01-13 DIAGNOSIS — E87.6 HYPOKALEMIA: ICD-10-CM

## 2023-01-16 NOTE — TELEPHONE ENCOUNTER
Request for klor-con.   Next aziza on 1/24/23    Next Visit Date:  Future Appointments   Date Time Provider Rachel Wagneri   1/24/2023 11:00 AM Laura Palacios MD [address] [facility]   Topic Date Due    Cervical cancer screen  02/05/2021    COVID-19 Vaccine (3 - Booster for Moderna series) 08/28/2021    Depression Screen  10/11/2023    Lipids  06/11/2025    DTaP/Tdap/Td vaccine (2 - Td or Tdap) 09/13/2026    Flu vaccine  Completed    Pneumococcal 0-64 years Vaccine  Completed    Hepatitis C screen  Completed    HIV screen  Completed    Hepatitis A vaccine  Aged Out    Hib vaccine  Aged Out    Meningococcal (ACWY) vaccine  Aged Out    Varicella vaccine  Discontinued       No results found for: LABA1C          ( goal A1C is < 7)   No results found for: LABMICR  LDL Cholesterol (mg/dL)   Date Value   06/11/2020 97       (goal LDL is <100)   AST (U/L)   Date Value   11/09/2022 18     ALT (U/L)   Date Value   11/09/2022 11     BUN (mg/dL)   Date Value   12/12/2022 7     BP Readings from Last 3 Encounters:   11/09/22 (!) 146/93   10/11/22 103/76   06/03/22 119/83          (goal 120/80)    All Future Testing planned in [facility]        Patient Active Problem List:     GERD (gastroesophageal reflux disease)     Mental retardation     History of seizure disorder     Moderate persistent asthma without complication     Gastroesophageal reflux disease with esophagitis without hemorrhage     Intractable vomiting

## 2023-01-18 RX ORDER — POTASSIUM CHLORIDE 1500 MG/1
TABLET, EXTENDED RELEASE ORAL
Qty: 30 TABLET | Refills: 2 | Status: SHIPPED | OUTPATIENT
Start: 2023-01-18

## 2023-01-24 ENCOUNTER — OFFICE VISIT (OUTPATIENT)
Dept: INTERNAL MEDICINE | Age: 43
End: 2023-01-24
Payer: COMMERCIAL

## 2023-01-24 VITALS
WEIGHT: 111.6 LBS | SYSTOLIC BLOOD PRESSURE: 130 MMHG | OXYGEN SATURATION: 93 % | HEART RATE: 73 BPM | DIASTOLIC BLOOD PRESSURE: 80 MMHG | BODY MASS INDEX: 20.54 KG/M2 | HEIGHT: 62 IN

## 2023-01-24 DIAGNOSIS — M62.838 MUSCLE SPASM: ICD-10-CM

## 2023-01-24 DIAGNOSIS — M54.2 NECK PAIN ON LEFT SIDE: Primary | ICD-10-CM

## 2023-01-24 DIAGNOSIS — R05.3 CHRONIC COUGH: ICD-10-CM

## 2023-01-24 DIAGNOSIS — R11.2 INTRACTABLE NAUSEA AND VOMITING: ICD-10-CM

## 2023-01-24 DIAGNOSIS — R56.01 COMPLEX FEBRILE CONVULSION (HCC): ICD-10-CM

## 2023-01-24 DIAGNOSIS — K21.9 GASTROESOPHAGEAL REFLUX DISEASE WITHOUT ESOPHAGITIS: ICD-10-CM

## 2023-01-24 DIAGNOSIS — R10.9 ABDOMINAL CRAMPS: ICD-10-CM

## 2023-01-24 DIAGNOSIS — Z13.29 SCREENING FOR THYROID DISORDER: ICD-10-CM

## 2023-01-24 DIAGNOSIS — J45.20 MILD INTERMITTENT REACTIVE AIRWAY DISEASE WITHOUT COMPLICATION: ICD-10-CM

## 2023-01-24 PROCEDURE — G8427 DOCREV CUR MEDS BY ELIG CLIN: HCPCS

## 2023-01-24 PROCEDURE — 99211 OFF/OP EST MAY X REQ PHY/QHP: CPT | Performed by: INTERNAL MEDICINE

## 2023-01-24 PROCEDURE — G8420 CALC BMI NORM PARAMETERS: HCPCS

## 2023-01-24 PROCEDURE — 1036F TOBACCO NON-USER: CPT

## 2023-01-24 PROCEDURE — G8482 FLU IMMUNIZE ORDER/ADMIN: HCPCS

## 2023-01-24 PROCEDURE — 99213 OFFICE O/P EST LOW 20 MIN: CPT

## 2023-01-24 RX ORDER — LEVETIRACETAM 750 MG/1
TABLET ORAL
Qty: 60 TABLET | Refills: 3 | Status: SHIPPED | OUTPATIENT
Start: 2023-01-24

## 2023-01-24 RX ORDER — CYCLOBENZAPRINE HCL 5 MG
5 TABLET ORAL NIGHTLY PRN
Qty: 20 TABLET | Refills: 0 | Status: SHIPPED | OUTPATIENT
Start: 2023-01-24 | End: 2023-02-03

## 2023-01-24 RX ORDER — ONDANSETRON 4 MG/1
4 TABLET, FILM COATED ORAL 3 TIMES DAILY PRN
Qty: 15 TABLET | Refills: 3 | Status: SHIPPED | OUTPATIENT
Start: 2023-01-24

## 2023-01-24 RX ORDER — CALORIC SUPPLEMENT
1 LIQUID (ML) ORAL DAILY
Qty: 30 EACH | Refills: 3 | Status: SHIPPED | OUTPATIENT
Start: 2023-01-24

## 2023-01-24 RX ORDER — ALBUTEROL SULFATE 90 UG/1
AEROSOL, METERED RESPIRATORY (INHALATION)
Qty: 8.5 G | Refills: 3 | Status: SHIPPED | OUTPATIENT
Start: 2023-01-24

## 2023-01-24 RX ORDER — PANTOPRAZOLE SODIUM 40 MG/1
TABLET, DELAYED RELEASE ORAL
Qty: 30 TABLET | Refills: 3 | Status: SHIPPED | OUTPATIENT
Start: 2023-01-24

## 2023-01-24 RX ORDER — DICYCLOMINE HYDROCHLORIDE 10 MG/1
10 CAPSULE ORAL
Qty: 90 CAPSULE | Refills: 3 | Status: SHIPPED | OUTPATIENT
Start: 2023-01-24

## 2023-01-24 ASSESSMENT — ENCOUNTER SYMPTOMS
COUGH: 0
CHEST TIGHTNESS: 0
EYE DISCHARGE: 0
RHINORRHEA: 0
VOMITING: 1
NAUSEA: 1
BLOOD IN STOOL: 0
DIARRHEA: 0
SORE THROAT: 0
CONSTIPATION: 0
SHORTNESS OF BREATH: 0
ABDOMINAL PAIN: 0
WHEEZING: 0

## 2023-01-24 ASSESSMENT — PATIENT HEALTH QUESTIONNAIRE - PHQ9
SUM OF ALL RESPONSES TO PHQ QUESTIONS 1-9: 0
DEPRESSION UNABLE TO ASSESS: PT REFUSES
2. FEELING DOWN, DEPRESSED OR HOPELESS: 0
SUM OF ALL RESPONSES TO PHQ QUESTIONS 1-9: 0
SUM OF ALL RESPONSES TO PHQ QUESTIONS 1-9: 0
1. LITTLE INTEREST OR PLEASURE IN DOING THINGS: 0
SUM OF ALL RESPONSES TO PHQ9 QUESTIONS 1 & 2: 0
SUM OF ALL RESPONSES TO PHQ QUESTIONS 1-9: 0

## 2023-01-24 NOTE — PROGRESS NOTES
[facility]  [facility] [address] [address]  Dept: [phone]  Dept Fax: [phone]    Office Progress/Follow Up Note  Date of patient's visit: 1/24/2023  Patient's Name:  Kay Pratt YOB: 1980            Patient Care Team:  Lisandra Palma MD as PCP - General (Internal Medicine)  Silvester Lundborg, MD as Consulting Physician (Internal Medicine)  ________________________________________________________________________      Reason for Visit: Routine outpatient follow up  ________________________________________________________________________  Chief Complaint:  Gynecologic Exam (Pap smear) and Neck Pain    ________________________________________________________________________  History of Presenting Illness:  History was obtained from: patient, electronic medical record. Kay Pratt is a 38 y.o. is here for:    Chief complaint of having extreme left-sided neck pain that started last night and worsened this morning. Patient is flinching and having pain while moving her neck. Patient does not remember in what position she slept last night. There is no redness or swelling in that area. She denies fever, chills, headache, diaphoresis, vision changes, ear discharge or hearing problem. Patient states that she has been having nausea, vomiting and acid reflux issues all her life whenever she eats food. She states that when she takes 1 bite of anything she throws up. Patient does have a history of intractable nausea and vomiting initially thought to be secondary to marijuana use although she has quit using marijuana. Patient has been using ondansetron for vomiting. Patient also complains of heartburn, cough and muscle cramps in her abdomen, hands and legs. Past medical history is also significant for GERD on Protonix, asthma, seizure disorder on levetiracetam, past marijuana use, prior smoking.   Patient states that she quit smoking and using marijuana. Patient has followed up with neurology Dr. Micaela Terry on 6/3/2022. She requested refill of medications including ondansetron, Bentyl, levetiracetam, protonix and albuterol inhaler. Health maintenance:  Pap smear due and she is ready to get it done today.     Patient Active Problem List   Diagnosis    GERD (gastroesophageal reflux disease)    Mental retardation    History of seizure disorder    Moderate persistent asthma without complication    Gastroesophageal reflux disease with esophagitis without hemorrhage    Intractable vomiting       Allergies   Allergen Reactions    Omeprazole      Pt had a seizure         Current Outpatient Medications   Medication Sig Dispense Refill    ondansetron (ZOFRAN) 4 MG tablet Take 1 tablet by mouth 3 times daily as needed for Nausea or Vomiting 15 tablet 3    pantoprazole (PROTONIX) 40 MG tablet TAKE ONE TABLET BY MOUTH EVERY MORNING BEFORE BREAKFAST 30 tablet 3    levETIRAcetam (KEPPRA) 750 MG tablet TAKE ONE TABLET BY MOUTH TWICE A DAY 60 tablet 3    albuterol sulfate HFA (PROVENTIL;VENTOLIN;PROAIR) 108 (90 Base) MCG/ACT inhaler INHALE TWO PUFFS BY MOUTH EVERY 6 HOURS AS NEEDED FOR WHEEZING 8.5 g 3    dicyclomine (BENTYL) 10 MG capsule Take 1 capsule by mouth 4 times daily (before meals and nightly) 90 capsule 3    cyclobenzaprine (FLEXERIL) 5 MG tablet Take 1 tablet by mouth nightly as needed for Muscle spasms 20 tablet 0    Nutritional Supplements (ENSURE ORIGINAL) LIQD Take 1 each by mouth daily 30 each 3    KLOR-CON M20 20 MEQ extended release tablet TAKE ONE TABLET BY MOUTH DAILY 30 tablet 2    ondansetron (ZOFRAN ODT) 4 MG disintegrating tablet Take 1 tablet by mouth every 8 hours as needed for Nausea or Vomiting 12 tablet 0    vitamin D3 (CHOLECALCIFEROL) 25 MCG (1000 UT) TABS tablet TAKE ONE TABLET BY MOUTH DAILY 30 tablet 3    fluticasone (FLOVENT HFA) 220 MCG/ACT inhaler Inhale 2 puffs into the lungs 2 times daily as needed (Cough, Wheezing) 12 g 3 metoclopramide (REGLAN) 5 MG tablet Take 1 tablet by mouth 3 times daily (with meals) (Patient not taking: Reported on 6/3/2022) 90 tablet 0     No current facility-administered medications for this visit. Social History     Tobacco Use    Smoking status: Former     Packs/day: 1.00     Years: 10.00     Pack years: 10.00     Types: Cigarettes     Start date: 10/19/2003     Quit date: 3/7/2014     Years since quittin.8    Smokeless tobacco: Never    Tobacco comments:     denies current use    Substance Use Topics    Alcohol use: No    Drug use: Yes     Types: Marijuana (Weed)     Comment: used 10-15 years ago       Family History   Problem Relation Age of Onset    High Cholesterol Mother     Birth Defects Neg Hx     Diabetes Neg Hx       ________________________________________________________________________  Review of Systems:  Review of Systems   Constitutional:  Negative for activity change, chills, fatigue and fever. HENT:  Negative for congestion, rhinorrhea, sneezing and sore throat. Eyes:  Negative for discharge and visual disturbance. Respiratory:  Negative for cough, chest tightness, shortness of breath and wheezing. Cardiovascular:  Negative for chest pain, palpitations and leg swelling. Gastrointestinal:  Positive for nausea and vomiting. Negative for abdominal pain, blood in stool, constipation and diarrhea. Abdominal cramps episodes. Endocrine: Negative for cold intolerance and heat intolerance. Genitourinary:  Negative for difficulty urinating, flank pain, hematuria, urgency and vaginal discharge. Musculoskeletal:  Negative for arthralgias, joint swelling and neck pain. Left-sided neck pain   Skin:  Negative for pallor. Allergic/Immunologic: Negative for environmental allergies. Neurological:  Negative for dizziness, weakness, light-headedness and headaches. Hematological:  Does not bruise/bleed easily. Psychiatric/Behavioral:  Negative for confusion. The patient is nervous/anxious. ________________________________________________________________________  Physical Exam:  Vitals:    01/24/23 1054 01/24/23 1058   BP: (!) 133/95 130/80   Site: Left Upper Arm Left Upper Arm   Position: Sitting Sitting   Cuff Size: Medium Adult Medium Adult   Pulse: 73    SpO2: 93%    Weight: 111 lb 9.6 oz (50.6 kg)    Height: 5' 2\" (1.575 m)      BP Readings from Last 3 Encounters:   01/24/23 130/80   11/09/22 (!) 146/93   10/11/22 103/76        Physical Exam  Constitutional:       Appearance: Normal appearance. HENT:      Head: Normocephalic and atraumatic. Nose: Nose normal. No congestion. Mouth/Throat:      Mouth: Mucous membranes are moist.   Eyes:      Extraocular Movements: Extraocular movements intact. Pupils: Pupils are equal, round, and reactive to light. Neck:      Comments: Tenderness present on left side of the neck without erythema or edema. Range of motion is decreased due to pain. Cardiovascular:      Rate and Rhythm: Normal rate and regular rhythm. Pulses: Normal pulses. Heart sounds: Normal heart sounds. No murmur heard. Pulmonary:      Effort: Pulmonary effort is normal. No respiratory distress. Breath sounds: Normal breath sounds. No wheezing. Chest:      Chest wall: No tenderness. Abdominal:      General: Bowel sounds are normal. There is no distension. Palpations: Abdomen is soft. Tenderness: There is no abdominal tenderness. There is no guarding or rebound. Musculoskeletal:         General: Normal range of motion. Cervical back: Tenderness present. No rigidity. Right lower leg: No edema. Left lower leg: No edema. Skin:     General: Skin is warm. Coloration: Skin is not pale. Neurological:      General: No focal deficit present. Mental Status: She is alert and oriented to person, place, and time. Mental status is at baseline. Motor: No weakness.    Psychiatric: Comments: Patient is very anxious and nervous. Diagnostic findings:  CBC:  Lab Results   Component Value Date/Time    WBC 6.5 11/09/2022 08:10 AM    HGB 14.0 11/09/2022 08:10 AM     11/09/2022 08:10 AM     03/21/2012 12:39 PM       BMP:    Lab Results   Component Value Date/Time     12/12/2022 09:47 AM    K 4.1 12/12/2022 09:47 AM    CL 99 12/12/2022 09:47 AM    CO2 23 12/12/2022 09:47 AM    BUN 7 12/12/2022 09:47 AM    CREATININE 0.96 12/12/2022 09:47 AM    GLUCOSE 87 12/12/2022 09:47 AM    GLUCOSE 118 03/21/2012 12:39 PM       HEMOGLOBIN A1C: No results found for: LABA1C    FASTING LIPID PANEL:  Lab Results   Component Value Date    CHOL 210 (H) 06/11/2020    HDL 91 06/11/2020    TRIG 109 06/11/2020     ________________________________________________________________________  Health Maintenance:  Health Maintenance Due   Topic Date Due    Cervical cancer screen  02/05/2021     ________________________________________________________________________  Assessment and Plan:    1. Neck pain on left side  - Likely secondary to different position during sleeping. Rest and ice suggested. Flexeril prescribed. - cyclobenzaprine (FLEXERIL) 5 MG tablet; Take 1 tablet by mouth nightly as needed for Muscle spasms  Dispense: 20 tablet; Refill: 0    2. Intractable nausea and vomiting  - Refilled patient's ondansetron. Referral to GI placed. - ondansetron (ZOFRAN) 4 MG tablet; Take 1 tablet by mouth 3 times daily as needed for Nausea or Vomiting  Dispense: 15 tablet; Refill: 3  - AFL - Debby Alcaraz MD, Gastroenterology, [place]    3. Gastroesophageal reflux disease without esophagitis  - Refilled Protonix. - pantoprazole (PROTONIX) 40 MG tablet; TAKE ONE TABLET BY MOUTH EVERY MORNING BEFORE BREAKFAST  Dispense: 30 tablet; Refill: 3  - AFL - Debby Alcaraz MD, Gastroenterology, [place]    4.  Complex febrile convulsion (HCC)  - Refilled levetiracetam  - levETIRAcetam (KEPPRA) 750 MG tablet; TAKE ONE TABLET BY MOUTH TWICE A DAY  Dispense: 60 tablet; Refill: 3    5. Mild intermittent reactive airway disease without complication  - : Refilled albuterol inhaler  - albuterol sulfate HFA (PROVENTIL;VENTOLIN;PROAIR) 108 (90 Base) MCG/ACT inhaler; INHALE TWO PUFFS BY MOUTH EVERY 6 HOURS AS NEEDED FOR WHEEZING  Dispense: 8.5 g; Refill: 3    6. Chronic cough  - albuterol sulfate HFA (PROVENTIL;VENTOLIN;PROAIR) 108 (90 Base) MCG/ACT inhaler; INHALE TWO PUFFS BY MOUTH EVERY 6 HOURS AS NEEDED FOR WHEEZING  Dispense: 8.5 g; Refill: 3    7. Muscle spasm  -Flexeril order placed. - dicyclomine (BENTYL) 10 MG capsule; Take 1 capsule by mouth 4 times daily (before meals and nightly)  Dispense: 90 capsule; Refill: 3    8. Abdominal cramps  - Refill of dicyclomine.  - AFL - Janette Mtz MD, Gastroenterology, [facility]    9. Screening for thyroid disorder  - TSH With Reflex Ft4; Future  ________________________________________________________________________  Follow up and instructions: Follow up in 2 months for Pap smear. Palmer received counseling on the following healthy behaviors: medication compliance, diet and exercise    Discussed use, benefit, and side effects of prescribed medications. Barriers to medication compliance addressed. All patient questions answered. Pt voiced understanding. Patient given educational materials - see patient instructions    Laureen Conrad MD  Internal Medicine Resident PGY [address]   1/24/2023 12:32 PM        This note is created with the assistance of a speech-recognition program. While intending to generate a document that actually reflects the content of the visit, the document can still have some mistakes which may not have been identified and corrected by editing.

## 2023-01-30 ENCOUNTER — HOSPITAL ENCOUNTER (OUTPATIENT)
Age: 43
Setting detail: SPECIMEN
Discharge: HOME | End: 2023-01-30
Payer: COMMERCIAL

## 2023-01-30 DIAGNOSIS — Z13.29 SCREENING FOR THYROID DISORDER: ICD-10-CM

## 2023-01-30 LAB — TSH SERPL DL<=0.05 MIU/L-ACNC: 0.37 UIU/ML (ref 0.3–5)

## 2023-01-30 PROCEDURE — 36415 COLL VENOUS BLD VENIPUNCTURE: CPT

## 2023-01-30 PROCEDURE — 84443 ASSAY THYROID STIM HORMONE: CPT

## 2023-02-12 DIAGNOSIS — J45.20 MILD INTERMITTENT REACTIVE AIRWAY DISEASE WITHOUT COMPLICATION: ICD-10-CM

## 2023-02-12 DIAGNOSIS — R05.3 CHRONIC COUGH: ICD-10-CM

## 2023-02-13 NOTE — TELEPHONE ENCOUNTER
Request for Flovent Inhaler.       Next Visit Date:  Future Appointments   Date Time Provider Rachel Wagneri   5/2/2023 11:00 AM Chanda Marie MD [address] [facility]   Topic Date Due    Cervical cancer screen  02/05/2021    COVID-19 Vaccine (3 - Booster for Moderna series) 08/28/2021    Depression Screen  01/24/2024    Lipids  06/11/2025    DTaP/Tdap/Td vaccine (2 - Td or Tdap) 09/13/2026    Flu vaccine  Completed    Pneumococcal 0-64 years Vaccine  Completed    Hepatitis C screen  Completed    HIV screen  Completed    Hepatitis A vaccine  Aged Out    Hib vaccine  Aged Out    Meningococcal (ACWY) vaccine  Aged Out    Varicella vaccine  Discontinued       No results found for: LABA1C          ( goal A1C is < 7)   No results found for: LABMICR  LDL Cholesterol (mg/dL)   Date Value   06/11/2020 97       (goal LDL is <100)   AST (U/L)   Date Value   11/09/2022 18     ALT (U/L)   Date Value   11/09/2022 11     BUN (mg/dL)   Date Value   12/12/2022 7     BP Readings from Last 3 Encounters:   01/24/23 130/80   11/09/22 (!) 146/93   10/11/22 103/76          (goal 120/80)    All Future Testing planned in [facility]        Patient Active Problem List:     GERD (gastroesophageal reflux disease)     Mental retardation     History of seizure disorder     Moderate persistent asthma without complication     Gastroesophageal reflux disease with esophagitis without hemorrhage     Intractable vomiting

## 2023-02-14 RX ORDER — FLUTICASONE PROPIONATE 220 UG/1
AEROSOL, METERED RESPIRATORY (INHALATION)
Qty: 12 G | Refills: 3 | Status: SHIPPED | OUTPATIENT
Start: 2023-02-14

## 2023-02-18 DIAGNOSIS — M54.2 NECK PAIN ON LEFT SIDE: ICD-10-CM

## 2023-02-18 DIAGNOSIS — E55.9 VITAMIN D DEFICIENCY: ICD-10-CM

## 2023-02-20 NOTE — TELEPHONE ENCOUNTER
Request for flexeril.   Next aziza on 5/2/23    Next Visit Date:  Future Appointments   Date Time Provider Rachel Lucille   5/2/2023 11:00 AM Ozzy Dumont MD [address] [facility]   Topic Date Due    Cervical cancer screen  02/05/2021    COVID-19 Vaccine (3 - Booster for Moderna series) 08/28/2021    Depression Screen  01/24/2024    Lipids  06/11/2025    DTaP/Tdap/Td vaccine (2 - Td or Tdap) 09/13/2026    Flu vaccine  Completed    Pneumococcal 0-64 years Vaccine  Completed    Hepatitis C screen  Completed    HIV screen  Completed    Hepatitis A vaccine  Aged Out    Hib vaccine  Aged Out    Meningococcal (ACWY) vaccine  Aged Out    Varicella vaccine  Discontinued       No results found for: LABA1C          ( goal A1C is < 7)   No results found for: LABMICR  LDL Cholesterol (mg/dL)   Date Value   06/11/2020 97       (goal LDL is <100)   AST (U/L)   Date Value   11/09/2022 18     ALT (U/L)   Date Value   11/09/2022 11     BUN (mg/dL)   Date Value   12/12/2022 7     BP Readings from Last 3 Encounters:   01/24/23 130/80   11/09/22 (!) 146/93   10/11/22 103/76          (goal 120/80)    All Future Testing planned in [facility]        Patient Active Problem List:     GERD (gastroesophageal reflux disease)     Mental retardation     History of seizure disorder     Moderate persistent asthma without complication     Gastroesophageal reflux disease with esophagitis without hemorrhage     Intractable vomiting

## 2023-02-20 NOTE — TELEPHONE ENCOUNTER
Request for vitamin d3.   Next aziza on 5/2/23    Next Visit Date:  Future Appointments   Date Time Provider Rachel Wagneri   5/2/2023 11:00 AM Kemper Crigler, MD [address] [facility]   Topic Date Due    Cervical cancer screen  02/05/2021    COVID-19 Vaccine (3 - Booster for Moderna series) 08/28/2021    Depression Screen  01/24/2024    Lipids  06/11/2025    DTaP/Tdap/Td vaccine (2 - Td or Tdap) 09/13/2026    Flu vaccine  Completed    Pneumococcal 0-64 years Vaccine  Completed    Hepatitis C screen  Completed    HIV screen  Completed    Hepatitis A vaccine  Aged Out    Hib vaccine  Aged Out    Meningococcal (ACWY) vaccine  Aged Out    Varicella vaccine  Discontinued       No results found for: LABA1C          ( goal A1C is < 7)   No results found for: LABMICR  LDL Cholesterol (mg/dL)   Date Value   06/11/2020 97       (goal LDL is <100)   AST (U/L)   Date Value   11/09/2022 18     ALT (U/L)   Date Value   11/09/2022 11     BUN (mg/dL)   Date Value   12/12/2022 7     BP Readings from Last 3 Encounters:   01/24/23 130/80   11/09/22 (!) 146/93   10/11/22 103/76          (goal 120/80)    All Future Testing planned in [facility]        Patient Active Problem List:     GERD (gastroesophageal reflux disease)     Mental retardation     History of seizure disorder     Moderate persistent asthma without complication     Gastroesophageal reflux disease with esophagitis without hemorrhage     Intractable vomiting

## 2023-02-21 RX ORDER — MELATONIN
Qty: 30 TABLET | Refills: 3 | Status: SHIPPED | OUTPATIENT
Start: 2023-02-21

## 2023-02-21 RX ORDER — CYCLOBENZAPRINE HCL 5 MG
TABLET ORAL
Qty: 20 TABLET | Refills: 0 | Status: SHIPPED | OUTPATIENT
Start: 2023-02-21

## 2023-02-22 ENCOUNTER — TELEPHONE (OUTPATIENT)
Dept: INTERNAL MEDICINE | Age: 43
End: 2023-02-22

## 2023-02-22 DIAGNOSIS — R56.01 COMPLEX FEBRILE CONVULSION (HCC): ICD-10-CM

## 2023-02-22 DIAGNOSIS — E87.6 HYPOKALEMIA: ICD-10-CM

## 2023-02-22 NOTE — TELEPHONE ENCOUNTER
Faxed request from [facility] requesting a 90 day supply of Levetiracetam 750 mg.  Medication pending. Patient has an appt on 5/2/23.       Health Maintenance   Topic Date Due    Cervical cancer screen  02/05/2021    COVID-19 Vaccine (3 - Booster for Moderna series) 08/28/2021    Depression Screen  01/24/2024    Lipids  06/11/2025    DTaP/Tdap/Td vaccine (2 - Td or Tdap) 09/13/2026    Flu vaccine  Completed    Pneumococcal 0-64 years Vaccine  Completed    Hepatitis C screen  Completed    HIV screen  Completed    Hepatitis A vaccine  Aged Out    Hib vaccine  Aged Out    Meningococcal (ACWY) vaccine  Aged Out    Varicella vaccine  Discontinued             (applicable per patient's age: Cancer Screenings, Depression Screening, Fall Risk Screening, Immunizations)    LDL Cholesterol (mg/dL)   Date Value   06/11/2020 97     AST (U/L)   Date Value   11/09/2022 18     ALT (U/L)   Date Value   11/09/2022 11     BUN (mg/dL)   Date Value   12/12/2022 7      (goal A1C is < 7)   (goal LDL is <100) need 30-50% reduction from baseline     BP Readings from Last 3 Encounters:   01/24/23 130/80   11/09/22 (!) 146/93   10/11/22 103/76    (goal /80)      All Future Testing planned in [facility]:      Next Visit Date:  Future Appointments   Date Time Provider Rachel Adkins   5/2/2023 11:00 AM Ricardo Florian MD [id number]            Patient Active Problem List:     GERD (gastroesophageal reflux disease)     Mental retardation     History of seizure disorder     Moderate persistent asthma without complication     Gastroesophageal reflux disease with esophagitis without hemorrhage     Intractable vomiting

## 2023-02-24 RX ORDER — LEVETIRACETAM 750 MG/1
TABLET ORAL
Qty: 180 TABLET | Refills: 1 | Status: SHIPPED | OUTPATIENT
Start: 2023-02-24

## 2023-02-24 RX ORDER — POTASSIUM CHLORIDE 20 MEQ/1
TABLET, EXTENDED RELEASE ORAL
Qty: 90 TABLET | Refills: 1 | OUTPATIENT
Start: 2023-02-24

## 2023-02-24 NOTE — TELEPHONE ENCOUNTER
Refilled patient's Rx request for Keppra but not KCL as we need to repeat BMP before continuing. Please let the patient know. Thank you.

## 2023-02-25 DIAGNOSIS — M54.2 NECK PAIN ON LEFT SIDE: ICD-10-CM

## 2023-02-25 DIAGNOSIS — E55.9 VITAMIN D DEFICIENCY: ICD-10-CM

## 2023-02-28 RX ORDER — CYCLOBENZAPRINE HCL 5 MG
TABLET ORAL
Qty: 20 TABLET | Refills: 0 | OUTPATIENT
Start: 2023-02-28

## 2023-02-28 RX ORDER — MELATONIN
Qty: 30 TABLET | Refills: 3 | OUTPATIENT
Start: 2023-02-28

## 2023-03-08 ENCOUNTER — APPOINTMENT (OUTPATIENT)
Dept: CT IMAGING | Age: 43
End: 2023-03-08
Payer: COMMERCIAL

## 2023-03-08 ENCOUNTER — HOSPITAL ENCOUNTER (OUTPATIENT)
Age: 43
Setting detail: OBSERVATION
Discharge: HOME | End: 2023-03-09
Attending: EMERGENCY MEDICINE | Admitting: FAMILY MEDICINE
Payer: COMMERCIAL

## 2023-03-08 DIAGNOSIS — R10.84 GENERALIZED ABDOMINAL PAIN: ICD-10-CM

## 2023-03-08 DIAGNOSIS — R11.2 INTRACTABLE NAUSEA AND VOMITING: ICD-10-CM

## 2023-03-08 DIAGNOSIS — R11.2 NAUSEA AND VOMITING, UNSPECIFIED VOMITING TYPE: Primary | ICD-10-CM

## 2023-03-08 LAB
ABSOLUTE EOS #: 0.1 K/UL (ref 0–0.44)
ABSOLUTE IMMATURE GRANULOCYTE: 0.02 K/UL (ref 0–0.3)
ABSOLUTE LYMPH #: 2.16 K/UL (ref 1.1–3.7)
ABSOLUTE MONO #: 0.35 K/UL (ref 0.1–1.2)
ALBUMIN SERPL-MCNC: 4.7 G/DL (ref 3.5–5.2)
ALP SERPL-CCNC: 51 U/L (ref 35–104)
ALT SERPL-CCNC: 17 U/L (ref 5–33)
AMYLASE SERPL-CCNC: 103 U/L (ref 28–100)
ANION GAP SERPL CALCULATED.3IONS-SCNC: 13 MMOL/L (ref 9–17)
AST SERPL-CCNC: 23 U/L
BASOPHILS # BLD: 1 % (ref 0–2)
BASOPHILS ABSOLUTE: 0.05 K/UL (ref 0–0.2)
BILIRUB DIRECT SERPL-MCNC: 0.1 MG/DL
BILIRUB INDIRECT SERPL-MCNC: 0.3 MG/DL (ref 0–1)
BILIRUB SERPL-MCNC: 0.4 MG/DL (ref 0.3–1.2)
BUN SERPL-MCNC: 11 MG/DL (ref 6–20)
BUN/CREAT BLD: 13 (ref 9–20)
CALCIUM SERPL-MCNC: 9.6 MG/DL (ref 8.6–10.4)
CHLORIDE SERPL-SCNC: 103 MMOL/L (ref 98–107)
CO2 SERPL-SCNC: 23 MMOL/L (ref 20–31)
CREAT SERPL-MCNC: 0.85 MG/DL (ref 0.5–0.9)
EOSINOPHILS RELATIVE PERCENT: 1 % (ref 1–4)
GFR SERPL CREATININE-BSD FRML MDRD: >60 ML/MIN/1.73M2
GLUCOSE SERPL-MCNC: 153 MG/DL (ref 70–99)
HCG QUALITATIVE: NEGATIVE
HCT VFR BLD AUTO: 40.3 % (ref 36.3–47.1)
HGB BLD-MCNC: 14.4 G/DL (ref 11.9–15.1)
IMMATURE GRANULOCYTES: 0 %
LIPASE SERPL-CCNC: 14 U/L (ref 13–60)
LYMPHOCYTES # BLD: 28 % (ref 24–43)
MCH RBC QN AUTO: 30.6 PG (ref 25.2–33.5)
MCHC RBC AUTO-ENTMCNC: 35.7 G/DL (ref 28.4–34.8)
MCV RBC AUTO: 85.6 FL (ref 82.6–102.9)
MONOCYTES # BLD: 5 % (ref 3–12)
NRBC AUTOMATED: 0 PER 100 WBC
PDW BLD-RTO: 12.4 % (ref 11.8–14.4)
PLATELET # BLD AUTO: 396 K/UL (ref 138–453)
PMV BLD AUTO: 9.7 FL (ref 8.1–13.5)
POTASSIUM SERPL-SCNC: 3.7 MMOL/L (ref 3.7–5.3)
PROT SERPL-MCNC: 7.5 G/DL (ref 6.4–8.3)
RBC # BLD: 4.71 M/UL (ref 3.95–5.11)
SEG NEUTROPHILS: 65 % (ref 36–65)
SEGMENTED NEUTROPHILS ABSOLUTE COUNT: 4.92 K/UL (ref 1.5–8.1)
SODIUM SERPL-SCNC: 139 MMOL/L (ref 135–144)
WBC # BLD AUTO: 7.6 K/UL (ref 3.5–11.3)

## 2023-03-08 PROCEDURE — G0378 HOSPITAL OBSERVATION PER HR: HCPCS

## 2023-03-08 PROCEDURE — 80048 BASIC METABOLIC PNL TOTAL CA: CPT

## 2023-03-08 PROCEDURE — 84703 CHORIONIC GONADOTROPIN ASSAY: CPT

## 2023-03-08 PROCEDURE — 6360000004 HC RX CONTRAST MEDICATION: Performed by: EMERGENCY MEDICINE

## 2023-03-08 PROCEDURE — 96374 THER/PROPH/DIAG INJ IV PUSH: CPT

## 2023-03-08 PROCEDURE — 96375 TX/PRO/DX INJ NEW DRUG ADDON: CPT

## 2023-03-08 PROCEDURE — 96361 HYDRATE IV INFUSION ADD-ON: CPT

## 2023-03-08 PROCEDURE — 6360000002 HC RX W HCPCS: Performed by: EMERGENCY MEDICINE

## 2023-03-08 PROCEDURE — 93005 ELECTROCARDIOGRAM TRACING: CPT | Performed by: EMERGENCY MEDICINE

## 2023-03-08 PROCEDURE — 80076 HEPATIC FUNCTION PANEL: CPT

## 2023-03-08 PROCEDURE — 6360000002 HC RX W HCPCS: Performed by: NURSE PRACTITIONER

## 2023-03-08 PROCEDURE — 6370000000 HC RX 637 (ALT 250 FOR IP): Performed by: NURSE PRACTITIONER

## 2023-03-08 PROCEDURE — 2580000003 HC RX 258: Performed by: EMERGENCY MEDICINE

## 2023-03-08 PROCEDURE — 74177 CT ABD & PELVIS W/CONTRAST: CPT

## 2023-03-08 PROCEDURE — 85025 COMPLETE CBC W/AUTO DIFF WBC: CPT

## 2023-03-08 PROCEDURE — 2500000003 HC RX 250 WO HCPCS: Performed by: NURSE PRACTITIONER

## 2023-03-08 PROCEDURE — 2580000003 HC RX 258: Performed by: NURSE PRACTITIONER

## 2023-03-08 PROCEDURE — 82150 ASSAY OF AMYLASE: CPT

## 2023-03-08 PROCEDURE — 96372 THER/PROPH/DIAG INJ SC/IM: CPT

## 2023-03-08 PROCEDURE — 83690 ASSAY OF LIPASE: CPT

## 2023-03-08 PROCEDURE — 99222 1ST HOSP IP/OBS MODERATE 55: CPT | Performed by: NURSE PRACTITIONER

## 2023-03-08 PROCEDURE — 99285 EMERGENCY DEPT VISIT HI MDM: CPT

## 2023-03-08 PROCEDURE — 96376 TX/PRO/DX INJ SAME DRUG ADON: CPT

## 2023-03-08 RX ORDER — CYCLOBENZAPRINE HCL 5 MG
5 TABLET ORAL 3 TIMES DAILY PRN
Status: DISCONTINUED | OUTPATIENT
Start: 2023-03-08 | End: 2023-03-09 | Stop reason: HOSPADM

## 2023-03-08 RX ORDER — MAGNESIUM HYDROXIDE/ALUMINUM HYDROXICE/SIMETHICONE 120; 1200; 1200 MG/30ML; MG/30ML; MG/30ML
30 SUSPENSION ORAL EVERY 6 HOURS PRN
Status: DISCONTINUED | OUTPATIENT
Start: 2023-03-08 | End: 2023-03-09 | Stop reason: HOSPADM

## 2023-03-08 RX ORDER — ONDANSETRON 2 MG/ML
4 INJECTION INTRAMUSCULAR; INTRAVENOUS ONCE
Status: COMPLETED | OUTPATIENT
Start: 2023-03-08 | End: 2023-03-08

## 2023-03-08 RX ORDER — POTASSIUM CHLORIDE 20 MEQ/1
40 TABLET, EXTENDED RELEASE ORAL PRN
Status: DISCONTINUED | OUTPATIENT
Start: 2023-03-08 | End: 2023-03-09 | Stop reason: HOSPADM

## 2023-03-08 RX ORDER — 0.9 % SODIUM CHLORIDE 0.9 %
1000 INTRAVENOUS SOLUTION INTRAVENOUS ONCE
Status: COMPLETED | OUTPATIENT
Start: 2023-03-08 | End: 2023-03-08

## 2023-03-08 RX ORDER — ACETAMINOPHEN 650 MG/1
650 SUPPOSITORY RECTAL EVERY 6 HOURS PRN
Status: DISCONTINUED | OUTPATIENT
Start: 2023-03-08 | End: 2023-03-09 | Stop reason: HOSPADM

## 2023-03-08 RX ORDER — ONDANSETRON 4 MG/1
4 TABLET, ORALLY DISINTEGRATING ORAL EVERY 8 HOURS PRN
Status: DISCONTINUED | OUTPATIENT
Start: 2023-03-08 | End: 2023-03-09 | Stop reason: HOSPADM

## 2023-03-08 RX ORDER — SODIUM CHLORIDE 0.9 % (FLUSH) 0.9 %
10 SYRINGE (ML) INJECTION PRN
Status: DISCONTINUED | OUTPATIENT
Start: 2023-03-08 | End: 2023-03-09 | Stop reason: HOSPADM

## 2023-03-08 RX ORDER — PANTOPRAZOLE SODIUM 20 MG/1
20 TABLET, DELAYED RELEASE ORAL
Status: DISCONTINUED | OUTPATIENT
Start: 2023-03-09 | End: 2023-03-09 | Stop reason: HOSPADM

## 2023-03-08 RX ORDER — DEXTROSE, SODIUM CHLORIDE, AND POTASSIUM CHLORIDE 5; .45; .15 G/100ML; G/100ML; G/100ML
INJECTION INTRAVENOUS CONTINUOUS
Status: DISCONTINUED | OUTPATIENT
Start: 2023-03-08 | End: 2023-03-09 | Stop reason: HOSPADM

## 2023-03-08 RX ORDER — MAGNESIUM SULFATE 1 G/100ML
1000 INJECTION INTRAVENOUS PRN
Status: DISCONTINUED | OUTPATIENT
Start: 2023-03-08 | End: 2023-03-09 | Stop reason: HOSPADM

## 2023-03-08 RX ORDER — DICYCLOMINE HYDROCHLORIDE 10 MG/ML
20 INJECTION INTRAMUSCULAR ONCE
Status: COMPLETED | OUTPATIENT
Start: 2023-03-08 | End: 2023-03-08

## 2023-03-08 RX ORDER — ENOXAPARIN SODIUM 100 MG/ML
30 INJECTION SUBCUTANEOUS DAILY
Status: DISCONTINUED | OUTPATIENT
Start: 2023-03-08 | End: 2023-03-09 | Stop reason: HOSPADM

## 2023-03-08 RX ORDER — 0.9 % SODIUM CHLORIDE 0.9 %
80 INTRAVENOUS SOLUTION INTRAVENOUS ONCE
Status: DISCONTINUED | OUTPATIENT
Start: 2023-03-08 | End: 2023-03-09 | Stop reason: HOSPADM

## 2023-03-08 RX ORDER — PROCHLORPERAZINE EDISYLATE 5 MG/ML
10 INJECTION INTRAMUSCULAR; INTRAVENOUS EVERY 6 HOURS PRN
Status: DISCONTINUED | OUTPATIENT
Start: 2023-03-08 | End: 2023-03-09 | Stop reason: HOSPADM

## 2023-03-08 RX ORDER — DICYCLOMINE HYDROCHLORIDE 10 MG/1
10 CAPSULE ORAL
Status: DISCONTINUED | OUTPATIENT
Start: 2023-03-08 | End: 2023-03-09 | Stop reason: HOSPADM

## 2023-03-08 RX ORDER — LEVETIRACETAM 750 MG/1
750 TABLET ORAL 2 TIMES DAILY
Status: DISCONTINUED | OUTPATIENT
Start: 2023-03-08 | End: 2023-03-09 | Stop reason: HOSPADM

## 2023-03-08 RX ORDER — SODIUM CHLORIDE 9 MG/ML
INJECTION, SOLUTION INTRAVENOUS PRN
Status: DISCONTINUED | OUTPATIENT
Start: 2023-03-08 | End: 2023-03-09 | Stop reason: HOSPADM

## 2023-03-08 RX ORDER — CYANOCOBALAMIN 1000 UG/ML
1000 INJECTION, SOLUTION INTRAMUSCULAR; SUBCUTANEOUS ONCE
Status: COMPLETED | OUTPATIENT
Start: 2023-03-08 | End: 2023-03-08

## 2023-03-08 RX ORDER — ACETAMINOPHEN 325 MG/1
650 TABLET ORAL EVERY 6 HOURS PRN
Status: DISCONTINUED | OUTPATIENT
Start: 2023-03-08 | End: 2023-03-09 | Stop reason: HOSPADM

## 2023-03-08 RX ORDER — POTASSIUM CHLORIDE 7.45 MG/ML
10 INJECTION INTRAVENOUS PRN
Status: DISCONTINUED | OUTPATIENT
Start: 2023-03-08 | End: 2023-03-09 | Stop reason: HOSPADM

## 2023-03-08 RX ORDER — MORPHINE SULFATE 4 MG/ML
4 INJECTION, SOLUTION INTRAMUSCULAR; INTRAVENOUS ONCE
Status: COMPLETED | OUTPATIENT
Start: 2023-03-08 | End: 2023-03-08

## 2023-03-08 RX ORDER — ONDANSETRON 2 MG/ML
4 INJECTION INTRAMUSCULAR; INTRAVENOUS EVERY 6 HOURS PRN
Status: DISCONTINUED | OUTPATIENT
Start: 2023-03-08 | End: 2023-03-09 | Stop reason: HOSPADM

## 2023-03-08 RX ORDER — POLYETHYLENE GLYCOL 3350 17 G/17G
17 POWDER, FOR SOLUTION ORAL DAILY PRN
Status: DISCONTINUED | OUTPATIENT
Start: 2023-03-08 | End: 2023-03-09 | Stop reason: HOSPADM

## 2023-03-08 RX ORDER — DIPHENHYDRAMINE HYDROCHLORIDE 50 MG/ML
25 INJECTION INTRAMUSCULAR; INTRAVENOUS EVERY 6 HOURS PRN
Status: DISCONTINUED | OUTPATIENT
Start: 2023-03-08 | End: 2023-03-09 | Stop reason: HOSPADM

## 2023-03-08 RX ORDER — ALBUTEROL SULFATE 90 UG/1
2 AEROSOL, METERED RESPIRATORY (INHALATION) 4 TIMES DAILY
Status: DISCONTINUED | OUTPATIENT
Start: 2023-03-08 | End: 2023-03-09

## 2023-03-08 RX ORDER — SODIUM CHLORIDE 0.9 % (FLUSH) 0.9 %
5-40 SYRINGE (ML) INJECTION EVERY 12 HOURS SCHEDULED
Status: DISCONTINUED | OUTPATIENT
Start: 2023-03-08 | End: 2023-03-09 | Stop reason: HOSPADM

## 2023-03-08 RX ADMIN — Medication 80 ML: at 14:41

## 2023-03-08 RX ADMIN — DICYCLOMINE HYDROCHLORIDE 10 MG: 10 CAPSULE ORAL at 19:47

## 2023-03-08 RX ADMIN — ONDANSETRON 4 MG: 2 INJECTION INTRAMUSCULAR; INTRAVENOUS at 14:43

## 2023-03-08 RX ADMIN — PROCHLORPERAZINE EDISYLATE 10 MG: 5 INJECTION INTRAMUSCULAR; INTRAVENOUS at 19:46

## 2023-03-08 RX ADMIN — ONDANSETRON 4 MG: 2 INJECTION INTRAMUSCULAR; INTRAVENOUS at 12:54

## 2023-03-08 RX ADMIN — SODIUM CHLORIDE 1000 ML: 9 INJECTION, SOLUTION INTRAVENOUS at 19:52

## 2023-03-08 RX ADMIN — SODIUM CHLORIDE 1000 ML: 9 INJECTION, SOLUTION INTRAVENOUS at 12:56

## 2023-03-08 RX ADMIN — CYCLOBENZAPRINE HYDROCHLORIDE 5 MG: 5 TABLET, FILM COATED ORAL at 19:47

## 2023-03-08 RX ADMIN — SODIUM CHLORIDE, PRESERVATIVE FREE 10 ML: 5 INJECTION INTRAVENOUS at 19:55

## 2023-03-08 RX ADMIN — ENOXAPARIN SODIUM 30 MG: 100 INJECTION SUBCUTANEOUS at 19:47

## 2023-03-08 RX ADMIN — DICYCLOMINE HYDROCHLORIDE 20 MG: 10 INJECTION, SOLUTION INTRAMUSCULAR at 15:43

## 2023-03-08 RX ADMIN — MORPHINE SULFATE 4 MG: 4 INJECTION, SOLUTION INTRAMUSCULAR; INTRAVENOUS at 14:43

## 2023-03-08 RX ADMIN — ONDANSETRON 4 MG: 2 INJECTION INTRAMUSCULAR; INTRAVENOUS at 22:05

## 2023-03-08 RX ADMIN — SODIUM CHLORIDE, PRESERVATIVE FREE 10 ML: 5 INJECTION INTRAVENOUS at 14:40

## 2023-03-08 RX ADMIN — CYANOCOBALAMIN 1000 MCG: 1000 INJECTION, SOLUTION INTRAMUSCULAR; SUBCUTANEOUS at 20:28

## 2023-03-08 RX ADMIN — POTASSIUM CHLORIDE, DEXTROSE MONOHYDRATE AND SODIUM CHLORIDE: 150; 5; 450 INJECTION, SOLUTION INTRAVENOUS at 22:00

## 2023-03-08 RX ADMIN — IOPAMIDOL 75 ML: 755 INJECTION, SOLUTION INTRAVENOUS at 14:40

## 2023-03-08 RX ADMIN — LEVETIRACETAM 750 MG: 750 TABLET, FILM COATED ORAL at 19:47

## 2023-03-08 ASSESSMENT — PAIN DESCRIPTION - ORIENTATION
ORIENTATION: MID
ORIENTATION: MID;UPPER

## 2023-03-08 ASSESSMENT — ENCOUNTER SYMPTOMS
ABDOMINAL PAIN: 1
EYE DISCHARGE: 0
ABDOMINAL DISTENTION: 1
WHEEZING: 0
COUGH: 0
NAUSEA: 1
BLOOD IN STOOL: 0
EYE REDNESS: 0
CONSTIPATION: 0
DIARRHEA: 0
SINUS PRESSURE: 0
SINUS PAIN: 0
DIARRHEA: 1
PHOTOPHOBIA: 0
COLOR CHANGE: 0
VOMITING: 1
FACIAL SWELLING: 0
SHORTNESS OF BREATH: 0

## 2023-03-08 ASSESSMENT — PAIN SCALES - GENERAL
PAINLEVEL_OUTOF10: 8
PAINLEVEL_OUTOF10: 10
PAINLEVEL_OUTOF10: 4

## 2023-03-08 ASSESSMENT — PAIN - FUNCTIONAL ASSESSMENT
PAIN_FUNCTIONAL_ASSESSMENT: PREVENTS OR INTERFERES SOME ACTIVE ACTIVITIES AND ADLS
PAIN_FUNCTIONAL_ASSESSMENT: 0-10

## 2023-03-08 ASSESSMENT — PAIN DESCRIPTION - DESCRIPTORS: DESCRIPTORS: ACHING;CRAMPING

## 2023-03-08 ASSESSMENT — PAIN DESCRIPTION - ONSET: ONSET: ON-GOING

## 2023-03-08 ASSESSMENT — LIFESTYLE VARIABLES
HOW MANY STANDARD DRINKS CONTAINING ALCOHOL DO YOU HAVE ON A TYPICAL DAY: PATIENT DOES NOT DRINK
HOW OFTEN DO YOU HAVE A DRINK CONTAINING ALCOHOL: NEVER

## 2023-03-08 ASSESSMENT — PAIN DESCRIPTION - LOCATION
LOCATION: ABDOMEN
LOCATION: ABDOMEN
LOCATION: ABDOMEN;CHEST
LOCATION: CHEST;OTHER (COMMENT)

## 2023-03-08 ASSESSMENT — PAIN DESCRIPTION - PAIN TYPE
TYPE: ACUTE PAIN
TYPE: ACUTE PAIN

## 2023-03-08 ASSESSMENT — PAIN DESCRIPTION - FREQUENCY: FREQUENCY: CONTINUOUS

## 2023-03-08 NOTE — H&P
[facility]  Office: [address], DO, Adrianna Press, DO, Nelia Angelesing, DO, Stanley Persaud Blood, DO, Rosa Herrera MD, Aleida Martinez MD, Martin Ugarte MD, Josep De La O MD,  Nicole Villanueva MD, Nereida López MD, Nazanin Wise, DO, Terrance Fry MD,  Too Carson MD, Jazzy Olmos MD, Ame Hendricks, DO, Charanjit Mccormick MD, Razia Pham MD, Lacey Junior DO, Rosmery Easton MD, Nannette Martines MD, Vlad Elizalde MD, Jaxon Macdonald MD, Nathaniel Petersen, DO, Tamela Jaimes MD, Mary Jack MD, Artemio Frederick, CNP,  Jin Mckeon, CNP, Arun Servcole, CNP, Evita Woods, CNP,  Osmany Braun, [facility], Misti Munoz, CNP, Megan Rios, CNP, Bety Ward, CNP, Lili Swanson, CNP, Ruma Cornea, CNP, Roylene Goodell, PA-C, Samantha Balderas, CNS, Benjamin Mesa, CNP, Ehsan Quiñones, [facility]    HISTORY AND PHYSICAL EXAMINATION            Date:   3/8/2023  Patient name:  Carolina Powell  Date of admission:  3/8/2023 12:32 PM  MRN:   [id number]  Account:  [de-identified]  YOB: 1980  PCP:    Romi Linares MD  Room:   [place] [address]  Code Status:    Prior    Chief Complaint:     Chief Complaint   Patient presents with    Abdominal Pain    Emesis     For the last 4 days, unable to keep down water       History Obtained From:     patient    History of Present Illness:     Carolina Powell is a 41 y.o. Non- / non  female who presents with Abdominal Pain and Emesis (For the last 4 days, unable to keep down water)   and is admitted to the hospital for the management of Intractable vomiting. Patient reports to the emergency department with intractable nausea and vomiting with diarrhea. Patient has had the symptoms several times over the past several years. Typically she is hydrated in the emergency department and discharged.   It was thought that this was cyclical vomiting secondary to marijuana use however she has not used marijuana for several years by her own report.  At the patient's most recent hospital admission for intractable nausea with vomiting she was given a referral to gastroenterology for outpatient GI work-up however patient did not follow-up with the referral as instructed.    Patient reports her symptoms started 4 days ago and started as nausea and quickly developed into intractable vomiting with diarrhea.  She also endorses chills and body aches but no documented fever.  Emergency department treatment included Zofran IV hydration this was unsuccessful in alleviating her symptoms.  Patient denies any abdominal surgeries and reports she still has her gallbladder.  Pancreatic enzymes and liver enzymes are WNL.    Past Medical History:     Past Medical History:   Diagnosis Date    GERD (gastroesophageal reflux disease)     Intractable nausea and vomiting 10/7/2021    Irritable bowel syndrome without diarrhea 2016    Moderate persistent asthma without complication     Moderate persistent asthma without complication     Seizures (HCC)     2013, 14        Past Surgical History:     Past Surgical History:   Procedure Laterality Date     SECTION          TUBAL LIGATION          UPPER GASTROINTESTINAL ENDOSCOPY N/A 10/9/2021    EGD BIOPSY performed by Katiuska Kingston MD at [facility] OR        Medications Prior to Admission:     Prior to Admission medications    Medication Sig Start Date End Date Taking? Authorizing Provider   levETIRAcetam (KEPPRA) 750 MG tablet TAKE ONE TABLET BY MOUTH TWICE A DAY 23   Yesenia Bray MD   vitamin D3 (CHOLECALCIFEROL) 25 MCG (1000 UT) TABS tablet TAKE ONE TABLET BY MOUTH DAILY 23   Yesenia Bray MD   cyclobenzaprine (FLEXERIL) 5 MG tablet TAKE ONE TABLET BY MOUTH ONCE NIGHTLY AS NEEDED FOR MUSCLE SPAMS 23   Yesenia Bray MD   FLOVENT  MCG/ACT inhaler INHALE TWO PUFFS BY MOUTH TWICE A DAY AS NEEDED FOR  COUGH, WHEEZING 2/14/23   Chanda Marie MD   ondansetron (ZOFRAN) 4 MG tablet Take 1 tablet by mouth 3 times daily as needed for Nausea or Vomiting 1/24/23   Angi Burt MD   pantoprazole (PROTONIX) 40 MG tablet TAKE ONE TABLET BY MOUTH EVERY MORNING BEFORE BREAKFAST 1/24/23   Alma Charles MD   albuterol sulfate HFA (PROVENTIL;VENTOLIN;PROAIR) 108 (90 Base) MCG/ACT inhaler INHALE TWO PUFFS BY MOUTH EVERY 6 HOURS AS NEEDED FOR WHEEZING 1/24/23   Angi Burt MD   dicyclomine (BENTYL) 10 MG capsule Take 1 capsule by mouth 4 times daily (before meals and nightly) 1/24/23   Angi Burt MD   Nutritional Supplements (ENSURE ORIGINAL) LIQD Take 1 each by mouth daily 1/24/23   Angi Burt MD   KLOR-CON M20 20 MEQ extended release tablet TAKE ONE TABLET BY MOUTH DAILY 1/18/23   Chanda Marie MD        Allergies:     Omeprazole    Social History:     Tobacco:    reports that she quit smoking about 9 years ago. Her smoking use included cigarettes. She started smoking about 19 years ago. She has a 10.00 pack-year smoking history. She has never used smokeless tobacco.  Alcohol:      reports no history of alcohol use. Drug Use:  reports that she does not currently use drugs after having used the following drugs: Marijuana Darryle Pimple). Family History:     Family History   Problem Relation Age of Onset    High Cholesterol Mother     Dementia Mother     Dementia Father     Birth Defects Neg Hx     Diabetes Neg Hx        Review of Systems:     Positive and Negative as described in HPI. Review of Systems   Constitutional:  Positive for activity change, appetite change, chills, fatigue and fever. HENT:  Negative for sinus pressure and sinus pain. Eyes:  Negative for photophobia and visual disturbance. Respiratory:  Negative for cough, shortness of breath and wheezing. Cardiovascular: Negative. Negative for chest pain, palpitations and leg swelling.    Gastrointestinal:  Positive for abdominal distention, abdominal pain, diarrhea, nausea and vomiting. Negative for constipation. Endocrine: Negative for cold intolerance, heat intolerance and polyuria. Genitourinary:  Negative for difficulty urinating and urgency. Musculoskeletal:  Negative for arthralgias and myalgias. Skin: Negative. Negative for wound. Neurological:  Negative for dizziness, syncope, weakness and light-headedness. Hematological:  Negative for adenopathy. Does not bruise/bleed easily. Psychiatric/Behavioral:  Negative for agitation and confusion. The patient is not nervous/anxious. Physical Exam:   BP (!) 140/59   Pulse (!) 49   Temp 98.6 °F (37 °C) (Oral)   Resp 18   Ht 5' 2\" (1.575 m)   Wt 108 lb (49 kg)   LMP 2023   SpO2 100%   BMI 19.75 kg/m²   Temp (24hrs), Av.2 °F (36.8 °C), Min:97.7 °F (36.5 °C), Max:98.6 °F (37 °C)    No results for input(s): POCGLU in the last 72 hours. No intake or output data in the 24 hours ending 23 1706    Physical Exam  Constitutional:       General: She is not in acute distress. Appearance: Normal appearance. She is normal weight. She is not toxic-appearing. HENT:      Head: Normocephalic and atraumatic. Mouth/Throat:      Mouth: Mucous membranes are moist.   Eyes:      Extraocular Movements: Extraocular movements intact. Pupils: Pupils are equal, round, and reactive to light. Cardiovascular:      Rate and Rhythm: Normal rate and regular rhythm. Pulses: Normal pulses. Heart sounds: Normal heart sounds. No murmur heard. Pulmonary:      Effort: Pulmonary effort is normal. No respiratory distress. Abdominal:      General: Abdomen is flat. Bowel sounds are normal. There is no distension. Palpations: Abdomen is soft. Tenderness: There is abdominal tenderness. There is guarding. Musculoskeletal:         General: No swelling or tenderness. Normal range of motion. Cervical back: Normal range of motion and neck supple.   Skin:     General: Skin is warm and dry.      Capillary Refill: Capillary refill takes less than 2 seconds.      Coloration: Skin is not pale.   Neurological:      General: No focal deficit present.      Mental Status: She is alert and oriented to person, place, and time. Mental status is at baseline.   Psychiatric:         Mood and Affect: Mood normal.         Behavior: Behavior normal.         Thought Content: Thought content normal.         Judgment: Judgment normal.       Investigations:      Laboratory Testing:  Recent Results (from the past 24 hour(s))   EKG 12 Lead    Collection Time: 03/08/23  1:02 PM   Result Value Ref Range    Ventricular Rate 54 BPM    Atrial Rate 54 BPM    P-R Interval 122 ms    QRS Duration 90 ms    Q-T Interval 484 ms    QTc Calculation (Bazett) 458 ms    P Axis 63 degrees    R Axis 64 degrees    T Axis 61 degrees   CBC with Auto Differential    Collection Time: 03/08/23  1:07 PM   Result Value Ref Range    WBC 7.6 3.5 - 11.3 k/uL    RBC 4.71 3.95 - 5.11 m/uL    Hemoglobin 14.4 11.9 - 15.1 g/dL    Hematocrit 40.3 36.3 - 47.1 %    MCV 85.6 82.6 - 102.9 fL    MCH 30.6 25.2 - 33.5 pg    MCHC 35.7 (H) 28.4 - 34.8 g/dL    RDW 12.4 11.8 - 14.4 %    Platelets 396 138 - 453 k/uL    MPV 9.7 8.1 - 13.5 fL    NRBC Automated 0.0 0.0 per 100 WBC    Seg Neutrophils 65 36 - 65 %    Lymphocytes 28 24 - 43 %    Monocytes 5 3 - 12 %    Eosinophils % 1 1 - 4 %    Basophils 1 0 - 2 %    Immature Granulocytes 0 0 %    Segs Absolute 4.92 1.50 - 8.10 k/uL    Absolute Lymph # 2.16 1.10 - 3.70 k/uL    Absolute Mono # 0.35 0.10 - 1.20 k/uL    Absolute Eos # 0.10 0.00 - 0.44 k/uL    Basophils Absolute 0.05 0.00 - 0.20 k/uL    Absolute Immature Granulocyte 0.02 0.00 - 0.30 k/uL   Basic Metabolic Panel    Collection Time: 03/08/23  1:07 PM   Result Value Ref Range    Glucose 153 (H) 70 - 99 mg/dL    BUN 11 6 - 20 mg/dL    Creatinine 0.85 0.50 - 0.90 mg/dL    Est, Glom Filt Rate >60 >60 mL/min/1.73m2    Bun/Cre  Ratio 13 9 - 20    Calcium 9.6 8.6 - 10.4 mg/dL    Sodium 139 135 - 144 mmol/L    Potassium 3.7 3.7 - 5.3 mmol/L    Chloride 103 98 - 107 mmol/L    CO2 23 20 - 31 mmol/L    Anion Gap 13 9 - 17 mmol/L   HCG Qualitative, Serum    Collection Time: 03/08/23  1:07 PM   Result Value Ref Range    hCG Qual NEGATIVE NEGATIVE   Amylase    Collection Time: 03/08/23  1:07 PM   Result Value Ref Range    Amylase 103 (H) 28 - 100 U/L   Lipase    Collection Time: 03/08/23  1:07 PM   Result Value Ref Range    Lipase 14 13 - 60 U/L   Hepatic Function Panel    Collection Time: 03/08/23  1:07 PM   Result Value Ref Range    Albumin 4.7 3.5 - 5.2 g/dL    Alkaline Phosphatase 51 35 - 104 U/L    ALT 17 5 - 33 U/L    AST 23 <32 U/L    Total Bilirubin 0.4 0.3 - 1.2 mg/dL    Bilirubin, Direct 0.1 <0.3 mg/dL    Bilirubin, Indirect 0.3 0.0 - 1.0 mg/dL    Total Protein 7.5 6.4 - 8.3 g/dL       Imaging/Diagnostics:  CT ABDOMEN PELVIS W IV CONTRAST Additional Contrast? None    Result Date: 3/8/2023  Polycystic kidney disease Uterine fibroids Very little mesenteric fat with poor delineation of the intra-abdominal and intrapelvic anatomy.        Assessment :      Hospital Problems             Last Modified POA    * (Principal) Intractable vomiting 3/8/2023 Yes    Intractable vomiting with nausea 3/8/2023 Yes    History of seizure disorder 3/8/2023 Yes    Moderate persistent asthma without complication 7/4/9779 Yes    Gastroesophageal reflux disease with esophagitis without hemorrhage 3/8/2023 Yes       Plan:     Patient status observation in the  Med/Surge    Intractable nausea with vomiting and a known history of GERD with esophagitis  PPI, diet advancement as tolerated, IV hydration  Antiemetics with Zofran, Compazine, Benadryl/B12  Moderate persistent asthma without acute exacerbation  Home regimen as ordered  History of seizure disorder  Home regimen as ordered  Seizure precautions    Consultations:   Kurtis Matt HOSPITALIST    Patient is admitted as inpatient status because of co-morbidities listed above, severity of signs and symptoms as outlined, requirement for current medical therapies and most importantly because of direct risk to patient if care not provided in a hospital setting. Expected length of stay > 48 hours.     PRESTON Henry NP  3/8/2023  5:06 PM    Copy sent to Dr. Cresencio Bowens MD

## 2023-03-08 NOTE — PROGRESS NOTES
The patient arrived to the room from the ED; awake and alert, complains of N/V and abdominal pain. The patient was oriented to the room, call light, safety and bed mechanics. Call light in reach.

## 2023-03-08 NOTE — ED PROVIDER NOTES
[address] [place] [address] ED  EMERGENCY DEPARTMENT ENCOUNTER      Pt Name: Margarita Whittaker  MRN: [id number]  [place] 1980  Date of evaluation: 3/8/2023  Provider: Virgil Conn MD    CHIEF COMPLAINT       Chief Complaint   Patient presents with    Abdominal Pain    Emesis     For the last 4 days, unable to keep down water         HISTORY OF PRESENT ILLNESS  (Location/Symptom, Timing/Onset, Context/Setting, Quality, Duration, Modifying Factors, Severity.)   Margarita Whittaker is a 41 y.o. female who presents to the emergency department for nausea vomiting and abdominal pain. No hematemesis. She had some diarrhea but no hematochezia. She has had this for 4 days. No fever cough chest pain or shortness of breath. She has a history of Crohn's disease and thinks she might be having a flareup. She rated the pain as a 10 and its continuous. Nursing Notes were reviewed.     ALLERGIES     Omeprazole    CURRENT MEDICATIONS       Previous Medications    ALBUTEROL SULFATE HFA (PROVENTIL;VENTOLIN;PROAIR) 108 (90 BASE) MCG/ACT INHALER    INHALE TWO PUFFS BY MOUTH EVERY 6 HOURS AS NEEDED FOR WHEEZING    CYCLOBENZAPRINE (FLEXERIL) 5 MG TABLET    TAKE ONE TABLET BY MOUTH ONCE NIGHTLY AS NEEDED FOR MUSCLE SPAMS    DICYCLOMINE (BENTYL) 10 MG CAPSULE    Take 1 capsule by mouth 4 times daily (before meals and nightly)    FLOVENT  MCG/ACT INHALER    INHALE TWO PUFFS BY MOUTH TWICE A DAY AS NEEDED FOR COUGH, WHEEZING    KLOR-CON M20 20 MEQ EXTENDED RELEASE TABLET    TAKE ONE TABLET BY MOUTH DAILY    LEVETIRACETAM (KEPPRA) 750 MG TABLET    TAKE ONE TABLET BY MOUTH TWICE A DAY    METOCLOPRAMIDE (REGLAN) 5 MG TABLET    Take 1 tablet by mouth 3 times daily (with meals)    NUTRITIONAL SUPPLEMENTS (ENSURE ORIGINAL) LIQD    Take 1 each by mouth daily    ONDANSETRON (ZOFRAN ODT) 4 MG DISINTEGRATING TABLET    Take 1 tablet by mouth every 8 hours as needed for Nausea or Vomiting    ONDANSETRON (ZOFRAN) 4 MG TABLET    Take 1 tablet by mouth 3 times daily as needed for Nausea or Vomiting    PANTOPRAZOLE (PROTONIX) 40 MG TABLET    TAKE ONE TABLET BY MOUTH EVERY MORNING BEFORE BREAKFAST    VITAMIN D3 (CHOLECALCIFEROL) 25 MCG (1000 UT) TABS TABLET    TAKE ONE TABLET BY MOUTH DAILY       PAST MEDICAL HISTORY         Diagnosis Date    GERD (gastroesophageal reflux disease)     Intractable nausea and vomiting 10/7/2021    Irritable bowel syndrome without diarrhea 2016    Moderate persistent asthma without complication     Moderate persistent asthma without complication     Seizures ([facility] [address]     2013, 14       SURGICAL HISTORY           Procedure Laterality Date     SECTION          TUBAL LIGATION          UPPER GASTROINTESTINAL ENDOSCOPY N/A 10/9/2021    EGD BIOPSY performed by Kaylee Azul MD at [place] [address] HISTORY           Problem Relation Age of Onset    High Cholesterol Mother     Birth Defects Neg Hx     Diabetes Neg Hx      Family Status   Relation Name Status    Mother  Alive    Father  Alive    Neg Hx  (Not Specified)        SOCIAL HISTORY      reports that she quit smoking about 9 years ago. Her smoking use included cigarettes. She started smoking about 19 years ago. She has a 10.00 pack-year smoking history. She has never used smokeless tobacco. She reports that she does not currently use drugs after having used the following drugs: Marijuana Coty Islas). She reports that she does not drink alcohol. REVIEW OF SYSTEMS    (2-9 systems for level 4, 10 or more for level 5)     Review of Systems   Constitutional:  Negative for chills, fatigue and fever. HENT:  Negative for congestion, ear discharge and facial swelling. Eyes:  Negative for discharge and redness. Respiratory:  Negative for cough and shortness of breath. Cardiovascular:  Negative for chest pain. Gastrointestinal:  Positive for abdominal pain, nausea and vomiting. Negative for blood in stool, constipation and diarrhea.    Genitourinary: Negative for dysuria and hematuria. Musculoskeletal:  Negative for arthralgias. Skin:  Negative for color change and rash. Neurological:  Negative for syncope, numbness and headaches. Hematological:  Negative for adenopathy. Psychiatric/Behavioral:  Negative for confusion. The patient is not nervous/anxious. Except as noted above the remainder of the review of systems was reviewed and negative. PHYSICAL EXAM    (up to 7 for level 4, 8 or more for level 5)     Vitals:    03/08/23 1225   BP: (!) 120/97   Pulse: 90   Resp: 18   Temp: 97.7 °F (36.5 °C)   TempSrc: Oral   SpO2: 100%   Weight: 108 lb (49 kg)   Height: 5' 2\" (1.575 m)       Physical Exam  Constitutional:       General: She is not in acute distress. Appearance: She is well-developed. She is not diaphoretic. HENT:      Head: Normocephalic and atraumatic. Eyes:      General: No scleral icterus. Right eye: No discharge. Left eye: No discharge. Cardiovascular:      Rate and Rhythm: Normal rate and regular rhythm. Pulmonary:      Effort: Pulmonary effort is normal. No respiratory distress. Breath sounds: Normal breath sounds. No stridor. No wheezing or rales. Abdominal:      General: There is no distension. Palpations: Abdomen is soft. Tenderness: There is abdominal tenderness. Comments: Mild diffuse tenderness. No rebound guarding or mass. Musculoskeletal:         General: Normal range of motion. Cervical back: Neck supple. Lymphadenopathy:      Cervical: No cervical adenopathy. Skin:     General: Skin is warm and dry. Findings: No erythema or rash. Neurological:      Mental Status: She is alert and oriented to person, place, and time.    Psychiatric:         Behavior: Behavior normal.           DIAGNOSTIC RESULTS     EKG: All EKG's are interpreted by the Emergency Department Physician who either signs or Co-signs this chart in the absence of a cardiologist.    RADIOLOGY: Non-plain film images such as CT, Ultrasound and MRI are read by the radiologist. Plain radiographic images are visualized and preliminarily interpreted by the emergency physician with the below findings:    Interpretation per the Radiologist below, if available at the time of this note:    CT ABDOMEN PELVIS W IV CONTRAST Additional Contrast? None    Result Date: 3/8/2023  EXAMINATION: CT OF THE ABDOMEN AND PELVIS WITH CONTRAST 3/8/2023 11:20 am TECHNIQUE: CT of the abdomen and pelvis was performed with the administration of intravenous contrast. Multiplanar reformatted images are provided for review. Automated exposure control, iterative reconstruction, and/or weight based adjustment of the mA/kV was utilized to reduce the radiation dose to as low as reasonably achievable. COMPARISON: 11/09/2022 and 01/12/2022 HISTORY: ORDERING SYSTEM PROVIDED HISTORY: Abdominal pain, history of Crohn's disease, vomiting TECHNOLOGIST PROVIDED HISTORY: Abdominal pain, history of Crohn's disease, vomiting Decision Support Exception - unselect if not a suspected or confirmed emergency medical condition->Emergency Medical Condition (MA) Reason for Exam: Abdominal pain, history of Crohn's disease, vomiting FINDINGS: Lower Chest: No acute findings. Organs: Nonspecific mild periportal edema. The gallbladder is without stone or significant wall thickening. No evidence for extrahepatic biliary dilatation. The pancreas, spleen and adrenals reveal no acute findings. Innumerable renal cysts are again demonstrated bilaterally GI/Bowel: There is no bowel dilatation or wall thickening identified. Very little mesenteric fat with poor delineation of the intra-abdominal and intrapelvic anatomy. Pelvis: Fibroid uterus. Peritoneum/Retroperitoneum: No free air or free fluid. The aorta is normal in caliber. The visceral branches are patent. No lymphadenopathy. Bones/Soft Tissues: No acute findings.      Polycystic kidney disease Uterine fibroids Very little mesenteric fat with poor delineation of the intra-abdominal and intrapelvic anatomy. LABS:  Labs Reviewed   CBC WITH AUTO DIFFERENTIAL - Abnormal; Notable for the following components:       Result Value    MCHC 35.7 (*)     All other components within normal limits   BASIC METABOLIC PANEL - Abnormal; Notable for the following components:    Glucose 153 (*)     All other components within normal limits   HCG, SERUM, QUALITATIVE   AMYLASE   LIPASE   HEPATIC FUNCTION PANEL       All other labs were within normal range or not returned as of this dictation. EMERGENCY DEPARTMENT COURSE and DIFFERENTIAL DIAGNOSIS/MDM:   Vitals:    Vitals:    03/08/23 1225   BP: (!) 120/97   Pulse: 90   Resp: 18   Temp: 97.7 °F (36.5 °C)   TempSrc: Oral   SpO2: 100%   Weight: 108 lb (49 kg)   Height: 5' 2\" (1.575 m)       Orders Placed This Encounter   Medications    ondansetron (ZOFRAN) injection 4 mg    0.9 % sodium chloride bolus    morphine sulfate (PF) injection 4 mg    ondansetron (ZOFRAN) injection 4 mg    sodium chloride flush 0.9 % injection 10 mL    iopamidol (ISOVUE-370) 76 % injection 75 mL    0.9 % sodium chloride bolus    dicyclomine (BENTYL) injection 20 mg       HEART SCORE: Not applicable    Medical Decision Making: The patient's laboratory and CAT scan work-up is negative. She has persistent pain and vomiting so should be admitted. She was given morphine and Bentyl and Zofran and IV fluids. There is no evidence of obstruction or abscess or colitis. Treatment diagnosis and disposition were discussed with the patient. Evaluation and treatment course in the ED, and plan of care upon patient was discussed in length with the patient. DDx include viral gastroenteritis, colitis, diverticulitis, nonspecific abdominal pain      I will order labs including CBC, BMP, and CAT scan of the abdomen. Test considered, but not ordered: None    The patient was involved in his/her plan of care.  The testing that was ordered was discussed with the patient. Any medications that may have been ordered were discussed with the patient. I have reviewed the patient's previous medical records using the electronic health record that we have available. Labs and imaging were reviewed. I have discusssed recommendation for admission with the patient and/or family. We have discussed benefits of admission and the risks of discharge home. The patient agrees with the plan of care and admission. The patient will be admitted for further evaluation and treatment. I have consulted the admitting service. The patient will be admitted to them, he/she agrees to accept the patient for further evaluation and treatment. CONSULTS:  IP CONSULT TO HOSPITALIST    PROCEDURES:  None    FINAL IMPRESSION      1. Nausea and vomiting, unspecified vomiting type    2. Generalized abdominal pain          DISPOSITION/PLAN   DISPOSITION Decision To Admit 03/08/2023 03:36:42 PM      PATIENT REFERRED TO:   No follow-up provider specified. DISCHARGE MEDICATIONS:     New Prescriptions    No medications on file       The care is provided during an unprecedented national emergency due to the novel coronavirus, COVID-19.     (Please note that portions of this note were completed with a voice recognition program.  Efforts were made to edit the dictations but occasionally words are mis-transcribed.)    Claudia Lechuga MD  Attending Emergency Physician           Claudia Lechuga MD  03/08/23 1638

## 2023-03-08 NOTE — ED NOTES
ED to inpatient nurses report     Chief Complaint   Patient presents with    Abdominal Pain    Emesis     For the last 4 days, unable to keep down water      Present to ED from Home  LOC: alert and orientated to name, place, date  Vital signs   Vitals:    03/08/23 1225 03/08/23 1552   BP: (!) 120/97 (!) 140/59   Pulse: 90 (!) 49   Resp: 18    Temp: 97.7 °F (36.5 °C) 98.6 °F (37 °C)   TempSrc: Oral Oral   SpO2: 100% 100%   Weight: 108 lb (49 kg)    Height: 5' 2\" (1.575 m)       Oxygen Baseline 100%    Current needs required none    LDAs:   Peripheral IV 03/08/23 Left Antecubital (Active)     Mobility: Independent  Fall Risk: Aladdin 1 Fall Risk  Presents to emergency department  because of falls (Syncope, seizure, or loss of consciousness): No (03/08/23 1229)  Age > 79: No (03/08/23 1229)  Altered Mental Status, Intoxication with alcohol or substance confusion (Disorientation, impaired judgment, poor safety awaremess, or inability to follow instructions): No (03/08/23 1229)  Impaired Mobility: Ambulates or transfers with assistive devices or assistance; Unable to ambulate or transer.: No (03/08/23 1229)  Nursing Judgement: No (03/08/23 1229)  Standard Fall Risk Interventions : Alexander the patient to the environment, especially the location of the bathroom;Call light and frequently used items within patient reach; Intentional Rounding;Provide a safe environment by clearing a pathway free of plugs, IV poles, and other obstructing items (03/08/23 1229)  Pending ED orders: none  Present condition: Fair  Code Status: FULL  Consults: IP CONSULT TO HOSPITALIST  [x]  Hospitalist  Completed  [] yes [] no Who:   []  Medicine  Completed  [] yes [] No Who:   []  Cardiology  Completed  [] yes [] No Who:   []  GI   Completed  [] yes [] No Who:   []  Neurology  Completed  [] yes [] No Who:   []  Nephrology Completed  [] yes [] No Who:    []  Vascular  Completed  [] yes [] No Who:   []  Ortho  Completed  [] yes [] No Who:     [] Surgery  Completed  [] yes [] No Who:    []  Urology  Completed  [] yes [] No Who:    []  CT Surgery Completed  [] yes [] No Who:   []  Podiatry  Completed  [] yes [] No Who:    []  Other    Completed  [] yes [] No Who:  Interventions: Labs, EKG, Fluids, IV, CT ABD, medication  Important Events: Pt came to ED d/t nausea, vomiting and diarrhea for the past four days. Pt c/o stomach pain and CP.         Electronically signed by Rk Maldonado RN on 3/8/2023 at 3:58 PM       Rk Maldonado RN  03/08/23 6889

## 2023-03-09 VITALS
WEIGHT: 106 LBS | TEMPERATURE: 99 F | HEIGHT: 62 IN | SYSTOLIC BLOOD PRESSURE: 138 MMHG | RESPIRATION RATE: 16 BRPM | BODY MASS INDEX: 19.51 KG/M2 | DIASTOLIC BLOOD PRESSURE: 70 MMHG | HEART RATE: 50 BPM | OXYGEN SATURATION: 98 %

## 2023-03-09 LAB
ABSOLUTE EOS #: <0.03 K/UL (ref 0–0.44)
ABSOLUTE IMMATURE GRANULOCYTE: 0.02 K/UL (ref 0–0.3)
ABSOLUTE LYMPH #: 1.5 K/UL (ref 1.1–3.7)
ABSOLUTE MONO #: 0.67 K/UL (ref 0.1–1.2)
ALBUMIN SERPL-MCNC: 4.3 G/DL (ref 3.5–5.2)
ALP SERPL-CCNC: 43 U/L (ref 35–104)
ALT SERPL-CCNC: 20 U/L (ref 5–33)
ANION GAP SERPL CALCULATED.3IONS-SCNC: 12 MMOL/L (ref 9–17)
AST SERPL-CCNC: 29 U/L
BASOPHILS # BLD: 0 % (ref 0–2)
BASOPHILS ABSOLUTE: <0.03 K/UL (ref 0–0.2)
BILIRUB SERPL-MCNC: 0.5 MG/DL (ref 0.3–1.2)
BUN SERPL-MCNC: 7 MG/DL (ref 6–20)
BUN/CREAT BLD: 12 (ref 9–20)
CALCIUM SERPL-MCNC: 9.2 MG/DL (ref 8.6–10.4)
CHLORIDE SERPL-SCNC: 103 MMOL/L (ref 98–107)
CO2 SERPL-SCNC: 21 MMOL/L (ref 20–31)
CREAT SERPL-MCNC: 0.57 MG/DL (ref 0.5–0.9)
EKG ATRIAL RATE: 54 BPM
EKG P AXIS: 63 DEGREES
EKG P-R INTERVAL: 122 MS
EKG Q-T INTERVAL: 484 MS
EKG QRS DURATION: 90 MS
EKG QTC CALCULATION (BAZETT): 458 MS
EKG R AXIS: 64 DEGREES
EKG T AXIS: 61 DEGREES
EKG VENTRICULAR RATE: 54 BPM
EOSINOPHILS RELATIVE PERCENT: 0 % (ref 1–4)
GFR SERPL CREATININE-BSD FRML MDRD: >60 ML/MIN/1.73M2
GLUCOSE SERPL-MCNC: 148 MG/DL (ref 70–99)
HCT VFR BLD AUTO: 37.4 % (ref 36.3–47.1)
HGB BLD-MCNC: 13.4 G/DL (ref 11.9–15.1)
IMMATURE GRANULOCYTES: 0 %
LIPASE SERPL-CCNC: 12 U/L (ref 13–60)
LYMPHOCYTES # BLD: 14 % (ref 24–43)
MAGNESIUM SERPL-MCNC: 1.8 MG/DL (ref 1.6–2.6)
MCH RBC QN AUTO: 30.7 PG (ref 25.2–33.5)
MCHC RBC AUTO-ENTMCNC: 35.8 G/DL (ref 28.4–34.8)
MCV RBC AUTO: 85.8 FL (ref 82.6–102.9)
MONOCYTES # BLD: 6 % (ref 3–12)
PDW BLD-RTO: 12.8 % (ref 11.8–14.4)
PHOSPHATE SERPL-MCNC: 2.4 MG/DL (ref 2.6–4.5)
PLATELET # BLD AUTO: 353 K/UL (ref 138–453)
PMV BLD AUTO: 9.4 FL (ref 8.1–13.5)
POTASSIUM SERPL-SCNC: 3.8 MMOL/L (ref 3.7–5.3)
PROT SERPL-MCNC: 6.8 G/DL (ref 6.4–8.3)
RBC # BLD: 4.36 M/UL (ref 3.95–5.11)
SEG NEUTROPHILS: 79 % (ref 36–65)
SEGMENTED NEUTROPHILS ABSOLUTE COUNT: 8.45 K/UL (ref 1.5–8.1)
SODIUM SERPL-SCNC: 136 MMOL/L (ref 135–144)
WBC # BLD AUTO: 10.7 K/UL (ref 3.5–11.3)

## 2023-03-09 PROCEDURE — 85025 COMPLETE CBC W/AUTO DIFF WBC: CPT

## 2023-03-09 PROCEDURE — 83690 ASSAY OF LIPASE: CPT

## 2023-03-09 PROCEDURE — 6370000000 HC RX 637 (ALT 250 FOR IP): Performed by: NURSE PRACTITIONER

## 2023-03-09 PROCEDURE — 96375 TX/PRO/DX INJ NEW DRUG ADDON: CPT

## 2023-03-09 PROCEDURE — 6360000002 HC RX W HCPCS: Performed by: NURSE PRACTITIONER

## 2023-03-09 PROCEDURE — G0378 HOSPITAL OBSERVATION PER HR: HCPCS

## 2023-03-09 PROCEDURE — 83735 ASSAY OF MAGNESIUM: CPT

## 2023-03-09 PROCEDURE — 2500000003 HC RX 250 WO HCPCS: Performed by: NURSE PRACTITIONER

## 2023-03-09 PROCEDURE — 80053 COMPREHEN METABOLIC PANEL: CPT

## 2023-03-09 PROCEDURE — 96372 THER/PROPH/DIAG INJ SC/IM: CPT

## 2023-03-09 PROCEDURE — 84100 ASSAY OF PHOSPHORUS: CPT

## 2023-03-09 PROCEDURE — 99239 HOSP IP/OBS DSCHRG MGMT >30: CPT | Performed by: NURSE PRACTITIONER

## 2023-03-09 PROCEDURE — 96361 HYDRATE IV INFUSION ADD-ON: CPT

## 2023-03-09 PROCEDURE — 36415 COLL VENOUS BLD VENIPUNCTURE: CPT

## 2023-03-09 RX ORDER — LOPERAMIDE HYDROCHLORIDE 2 MG/1
2 CAPSULE ORAL 4 TIMES DAILY PRN
Status: DISCONTINUED | OUTPATIENT
Start: 2023-03-09 | End: 2023-03-09 | Stop reason: HOSPADM

## 2023-03-09 RX ORDER — ONDANSETRON 4 MG/1
4 TABLET, ORALLY DISINTEGRATING ORAL 3 TIMES DAILY PRN
Qty: 21 TABLET | Refills: 0 | Status: SHIPPED | OUTPATIENT
Start: 2023-03-09

## 2023-03-09 RX ORDER — LOPERAMIDE HYDROCHLORIDE 2 MG/1
2 CAPSULE ORAL 4 TIMES DAILY PRN
Qty: 40 CAPSULE | Refills: 0 | Status: SHIPPED | OUTPATIENT
Start: 2023-03-09 | End: 2023-03-09 | Stop reason: SDUPTHER

## 2023-03-09 RX ORDER — LORAZEPAM 2 MG/ML
2 INJECTION INTRAMUSCULAR ONCE
Status: COMPLETED | OUTPATIENT
Start: 2023-03-09 | End: 2023-03-09

## 2023-03-09 RX ORDER — METOCLOPRAMIDE 5 MG/1
5 TABLET ORAL
Qty: 90 TABLET | Refills: 0 | Status: SHIPPED | OUTPATIENT
Start: 2023-03-09 | End: 2023-04-08

## 2023-03-09 RX ORDER — LOPERAMIDE HYDROCHLORIDE 2 MG/1
2 CAPSULE ORAL 4 TIMES DAILY PRN
Qty: 40 CAPSULE | Refills: 0 | Status: SHIPPED | OUTPATIENT
Start: 2023-03-09 | End: 2023-03-19

## 2023-03-09 RX ORDER — ALBUTEROL SULFATE 90 UG/1
2 AEROSOL, METERED RESPIRATORY (INHALATION) EVERY 4 HOURS PRN
Status: DISCONTINUED | OUTPATIENT
Start: 2023-03-09 | End: 2023-03-09 | Stop reason: HOSPADM

## 2023-03-09 RX ORDER — SCOLOPAMINE TRANSDERMAL SYSTEM 1 MG/1
1 PATCH, EXTENDED RELEASE TRANSDERMAL
Status: DISCONTINUED | OUTPATIENT
Start: 2023-03-09 | End: 2023-03-09 | Stop reason: HOSPADM

## 2023-03-09 RX ADMIN — LORAZEPAM 2 MG: 2 INJECTION INTRAMUSCULAR at 11:14

## 2023-03-09 RX ADMIN — DICYCLOMINE HYDROCHLORIDE 10 MG: 10 CAPSULE ORAL at 11:14

## 2023-03-09 RX ADMIN — PANTOPRAZOLE SODIUM 20 MG: 20 TABLET, DELAYED RELEASE ORAL at 05:46

## 2023-03-09 RX ADMIN — LEVETIRACETAM 750 MG: 750 TABLET, FILM COATED ORAL at 08:09

## 2023-03-09 RX ADMIN — ENOXAPARIN SODIUM 30 MG: 100 INJECTION SUBCUTANEOUS at 08:09

## 2023-03-09 RX ADMIN — POTASSIUM CHLORIDE, DEXTROSE MONOHYDRATE AND SODIUM CHLORIDE: 150; 5; 450 INJECTION, SOLUTION INTRAVENOUS at 05:59

## 2023-03-09 RX ADMIN — DICYCLOMINE HYDROCHLORIDE 10 MG: 10 CAPSULE ORAL at 05:47

## 2023-03-09 RX ADMIN — CYCLOBENZAPRINE HYDROCHLORIDE 5 MG: 5 TABLET, FILM COATED ORAL at 05:47

## 2023-03-09 ASSESSMENT — ENCOUNTER SYMPTOMS
SHORTNESS OF BREATH: 0
COUGH: 0
NAUSEA: 1
SINUS PRESSURE: 0
WHEEZING: 0
CONSTIPATION: 0
SINUS PAIN: 0
VOMITING: 1
DIARRHEA: 1
PHOTOPHOBIA: 0
ABDOMINAL PAIN: 1

## 2023-03-09 ASSESSMENT — VISUAL ACUITY: OU: 1

## 2023-03-09 NOTE — RT PROTOCOL NOTE
RT Inhaler-Nebulizer Bronchodilator Protocol Note    There is a bronchodilator order in the chart from a provider indicating to follow the RT Bronchodilator Protocol and there is an Initiate RT Inhaler-Nebulizer Bronchodilator Protocol order as well (see protocol at bottom of note). CXR Findings:  No results found. The findings from the last RT Protocol Assessment were as follows:   History Pulmonary Disease: None or smoker <15 pack years  Respiratory Pattern: Regular pattern and RR 12-20 bpm  Breath Sounds: Clear breath sounds  Cough: Strong, spontaneous, non-productive  Indication for Bronchodilator Therapy: On home bronchodilators  Bronchodilator Assessment Score: 0    Aerosolized bronchodilator medication orders have been revised according to the RT Inhaler-Nebulizer Bronchodilator Protocol below. Respiratory Therapist to perform RT Therapy Protocol Assessment initially then follow the protocol. Repeat RT Therapy Protocol Assessment PRN for score 0-3 or on second treatment, BID, and PRN for scores above 3. No Indications - adjust the frequency to every 6 hours PRN wheezing or bronchospasm, if no treatments needed after 48 hours then discontinue using Per Protocol order mode. If indication present, adjust the RT bronchodilator orders based on the Bronchodilator Assessment Score as indicated below. Use Inhaler orders unless patient has one or more of the following: on home nebulizer, not able to hold breath for 10 seconds, is not alert and oriented, cannot activate and use MDI correctly, or respiratory rate 25 breaths per minute or more, then use the equivalent nebulizer order(s) with same Frequency and PRN reasons based on the score. If a patient is on this medication at home then do not decrease Frequency below that used at home.     0-3 - enter or revise RT bronchodilator order(s) to equivalent RT Bronchodilator order with Frequency of every 4 hours PRN for wheezing or increased work of breathing using Per Protocol order mode. 4-6 - enter or revise RT Bronchodilator order(s) to two equivalent RT bronchodilator orders with one order with BID Frequency and one order with Frequency of every 4 hours PRN wheezing or increased work of breathing using Per Protocol order mode. 7-10 - enter or revise RT Bronchodilator order(s) to two equivalent RT bronchodilator orders with one order with TID Frequency and one order with Frequency of every 4 hours PRN wheezing or increased work of breathing using Per Protocol order mode. 11-13 - enter or revise RT Bronchodilator order(s) to one equivalent RT bronchodilator order with QID Frequency and an Albuterol order with Frequency of every 4 hours PRN wheezing or increased work of breathing using Per Protocol order mode. Greater than 13 - enter or revise RT Bronchodilator order(s) to one equivalent RT bronchodilator order with every 4 hours Frequency and an Albuterol order with Frequency of every 2 hours PRN wheezing or increased work of breathing using Per Protocol order mode. RT to enter RT Home Evaluation for COPD & MDI Assessment order using Per Protocol order mode.     Electronically signed by Eitan Blair RCP on 3/8/2023 at 8:00 PM

## 2023-03-09 NOTE — PROGRESS NOTES
CLINICAL PHARMACY NOTE: MEDS TO BEDS    Total # of Prescriptions Filled: 0   The following medications were delivered to the patient:      Additional Documentation:    Pt was discharged from the floor without her rx.     We put it on file  loperamide 2mg

## 2023-03-09 NOTE — PLAN OF CARE
Problem: Discharge Planning  Goal: Discharge to home or other facility with appropriate resources  Outcome: Progressing  Flowsheets (Taken 3/8/2023 1900)  Discharge to home or other facility with appropriate resources:   Identify barriers to discharge with patient and caregiver   Arrange for needed discharge resources and transportation as appropriate   Identify discharge learning needs (meds, wound care, etc)   Refer to discharge planning if patient needs post-hospital services based on physician order or complex needs related to functional status, cognitive ability or social support system     Problem: Pain  Goal: Verbalizes/displays adequate comfort level or baseline comfort level  Outcome: Progressing     Problem: Gastrointestinal - Adult  Goal: Minimal or absence of nausea and vomiting  Outcome: Progressing  Goal: Maintains or returns to baseline bowel function  Outcome: Progressing  Goal: Maintains adequate nutritional intake  Outcome: Progressing     Problem: Metabolic/Fluid and Electrolytes - Adult  Goal: Electrolytes maintained within normal limits  Outcome: Progressing   Pt complaint of severe nausea, difficulty with keeping anything down. Oral medication given over time to reduce nausea/ chance of patient throwing up meds. Flexeril given for abd cramping with some relief but pt states she is worried she will throw it up. No emesis since the start of shift. WCM.

## 2023-03-09 NOTE — PLAN OF CARE
Problem: Discharge Planning  Goal: Discharge to home or other facility with appropriate resources  Outcome: Progressing  Flowsheets (Taken 3/9/2023 0800)  Discharge to home or other facility with appropriate resources:   Identify barriers to discharge with patient and caregiver   Arrange for needed discharge resources and transportation as appropriate   Identify discharge learning needs (meds, wound care, etc)   Refer to discharge planning if patient needs post-hospital services based on physician order or complex needs related to functional status, cognitive ability or social support system     Problem: Pain  Goal: Verbalizes/displays adequate comfort level or baseline comfort level  Outcome: Progressing     Problem: Gastrointestinal - Adult  Goal: Minimal or absence of nausea and vomiting  Outcome: Progressing  Flowsheets (Taken 3/9/2023 0800)  Minimal or absence of nausea and vomiting:   Administer IV fluids as ordered to ensure adequate hydration   Administer ordered antiemetic medications as needed   Provide nonpharmacologic comfort measures as appropriate     Problem: Gastrointestinal - Adult  Goal: Maintains or returns to baseline bowel function  Outcome: Progressing  Flowsheets (Taken 3/9/2023 0800)  Maintains or returns to baseline bowel function:   Assess bowel function   Encourage oral fluids to ensure adequate hydration   Administer IV fluids as ordered to ensure adequate hydration   Administer ordered medications as needed   Encourage mobilization and activity     Problem: Gastrointestinal - Adult  Goal: Maintains adequate nutritional intake  Outcome: Progressing  Flowsheets (Taken 3/9/2023 0800)  Maintains adequate nutritional intake:   Monitor percentage of each meal consumed   Identify factors contributing to decreased intake, treat as appropriate   Assist with meals as needed   Monitor intake and output, weight and lab values     Problem: Metabolic/Fluid and Electrolytes - Adult  Goal: Electrolytes maintained within normal limits  Outcome: Progressing  Flowsheets (Taken 3/9/2023 0800)  Electrolytes maintained within normal limits:   Monitor labs and assess patient for signs and symptoms of electrolyte imbalances   Administer electrolyte replacement as ordered     Problem: Safety - Adult  Goal: Free from fall injury  Outcome: Progressing

## 2023-03-09 NOTE — DISCHARGE SUMMARY
[facility]  Office: [address], DO, Marlolorainelois Leung, DO, Dilma Duane, DO, Gerard Glover Louann, DO, Kandy Alvarez MD, Good Pinto MD, Humberto Rocha MD, Deyanira Helm MD,  Ruddy Reyes MD, Jayne Marie MD, Candy Shrestha, DO, Dora Cormier MD,  Luis Hicks MD, Jose Gonzalez MD, Iwona Gonzalez, DO, Misti Velazquez MD, Evie Cardenas MD, Eddie Wise DO, Deo Chavarria MD, Duarte Pierce MD, Yehuda Maher MD, Brady Washburn MD, Michelle Corea MD, Zari Pyle DO, Karishma Sepulveda MD, Opal Hinton MD, Precious Uribe, CNP,  Mary Abbott, CNP, Sarah Garcia, CNP, Delbert Aase, CNP,  Shaye Flores, [facility], Christy Grewal, CNP, Ginny Tran, CNP, Tim Brown, CNP, Margot Singh, CNP, Kade Mireles, CNP, Jacques Hinton, PA-C, Tanda Lesches, CNS, Kenney Spray, CNP, Hipolito Parsonss, [facility]    Discharge Summary     Patient ID: Joe Hui  :  1980   MRN: [id number]     ACCOUNT:  [de-identified]   Patient's PCP: Celsa Glasgow MD  Admit Date: 3/8/2023   Discharge Date: 3/9/2023     Length of Stay: 0  Code Status:  Full Code  Admitting Physician: Humberto Rocha MD  Discharge Physician: PRESTON Cota NP     Active Discharge Diagnoses:     Hospital Problem Lists:  Principal Problem:    Intractable vomiting  Active Problems:    Intractable vomiting with nausea    History of seizure disorder    Moderate persistent asthma without complication    Gastroesophageal reflux disease with esophagitis without hemorrhage  Resolved Problems:    * No resolved hospital problems.  *      Admission Condition:  fair     Discharged Condition: good    Hospital Stay:     Hospital Course:  Joe Hui is a 41 y.o. female who was admitted for the management of  Intractable vomiting , presented to ER with Abdominal Pain and Emesis (For the last 4 days, unable to keep down water)    3/8 - Patient reports to the emergency department with intractable nausea and vomiting with diarrhea. Patient has had the symptoms several times over the past several years. Typically she is hydrated in the emergency department and discharged. It was thought that this was cyclical vomiting secondary to marijuana use however she has not used marijuana for several years by her own report. At the patient's most recent hospital admission for intractable nausea with vomiting she was given a referral to gastroenterology for outpatient GI work-up however patient did not follow-up with the referral as instructed. 3/9 - Pt endorses 10 episodes of diarrhea this morning, one episode of vomiting, multiple episodes of dry heaving, and inability to tolerate food. Pt is able to tolerate drinking small amounts of water. 3/9 -afternoon reassessment reveals that the patient is much improved. She is able to tolerate full liquid diet. Patient has responded well to therapies and her diarrhea has slowed as well. Patient would like to be discharged and she is medically safe to do so.     Significant therapeutic interventions: As above    Significant Diagnostic Studies:   Labs / Micro:  CBC:   Lab Results   Component Value Date/Time    WBC 10.7 03/09/2023 06:13 AM    RBC 4.36 03/09/2023 06:13 AM    RBC 4.75 03/21/2012 12:39 PM    HGB 13.4 03/09/2023 06:13 AM    HCT 37.4 03/09/2023 06:13 AM    MCV 85.8 03/09/2023 06:13 AM    MCH 30.7 03/09/2023 06:13 AM    MCHC 35.8 03/09/2023 06:13 AM    RDW 12.8 03/09/2023 06:13 AM     03/09/2023 06:13 AM     03/21/2012 12:39 PM     BMP:    Lab Results   Component Value Date/Time    GLUCOSE 148 03/09/2023 06:13 AM    GLUCOSE 118 03/21/2012 12:39 PM     03/09/2023 06:13 AM    K 3.8 03/09/2023 06:13 AM     03/09/2023 06:13 AM    CO2 21 03/09/2023 06:13 AM    ANIONGAP 12 03/09/2023 06:13 AM    BUN 7 03/09/2023 06:13 AM    CREATININE 0.57 03/09/2023 06:13 AM    BUNCRER 12 03/09/2023 06:13 AM    CALCIUM 9.2 03/09/2023 06:13 AM    LABGLOM >60 03/09/2023 06:13 AM    GFRAA >60 01/12/2022 10:38 AM    GFR      01/12/2022 10:38 AM    GFR NOT REPORTED 01/12/2022 10:38 AM        Radiology:  CT ABDOMEN PELVIS W IV CONTRAST Additional Contrast? None    Result Date: 3/8/2023  Polycystic kidney disease Uterine fibroids Very little mesenteric fat with poor delineation of the intra-abdominal and intrapelvic anatomy.       Consultations:    Consults:     Final Specialist Recommendations/Findings:   IP CONSULT TO HOSPITALIST      The patient was seen and examined on day of discharge and this discharge summary is in conjunction with any daily progress note from day of discharge.    Discharge plan:     Disposition: Home    Physician Follow Up:     Valeriano Tim MD  [address] [place] [address]  [phone]    Follow up  You need to have an EGD/colonoscopy with biopsies to diagnose your underlying gastrointestinal problems.     Requiring Further Evaluation/Follow Up POST HOSPITALIZATION/Incidental Findings: Requires outpatient GI work-up.  Compliance with medication regiment.    Diet: regular diet - Follow-up bland diet.  Avoid sugary drinks and high acid foods.  Increase your water intake every day    Activity: As tolerated    Instructions to Patient: Make and keep a follow-up appointment with the gastroenterologist as recommended at your last hospitalization.  Follow a bland diet and increase your water intake.  Leave the scopolamine patch in place until Saturday at which time you will need to remove it.    Discharge Medications:      Medication List        START taking these medications      loperamide 2 MG capsule  Commonly known as: IMODIUM  Take 1 capsule by mouth 4 times daily as needed for Diarrhea            CONTINUE taking these medications      albuterol sulfate  (90 Base) MCG/ACT inhaler  Commonly known as: PROVENTIL;VENTOLIN;PROAIR  INHALE TWO PUFFS BY MOUTH  EVERY 6 HOURS AS NEEDED FOR WHEEZING     cyclobenzaprine 5 MG tablet  Commonly known as: FLEXERIL  TAKE ONE TABLET BY MOUTH ONCE NIGHTLY AS NEEDED FOR MUSCLE SPAMS     dicyclomine 10 MG capsule  Commonly known as: Bentyl  Take 1 capsule by mouth 4 times daily (before meals and nightly)     Ensure Original Liqd  Take 1 each by mouth daily     Flovent  MCG/ACT inhaler  Generic drug: fluticasone  INHALE TWO PUFFS BY MOUTH TWICE A DAY AS NEEDED FOR COUGH, WHEEZING     Klor-Con M20 20 MEQ extended release tablet  Generic drug: potassium chloride  TAKE ONE TABLET BY MOUTH DAILY     levETIRAcetam 750 MG tablet  Commonly known as: KEPPRA  TAKE ONE TABLET BY MOUTH TWICE A DAY     ondansetron 4 MG tablet  Commonly known as: ZOFRAN  Take 1 tablet by mouth 3 times daily as needed for Nausea or Vomiting     pantoprazole 40 MG tablet  Commonly known as: PROTONIX  TAKE ONE TABLET BY MOUTH EVERY MORNING BEFORE BREAKFAST     vitamin D3 25 MCG (1000 UT) Tabs tablet  Commonly known as: CHOLECALCIFEROL  TAKE ONE TABLET BY MOUTH DAILY            STOP taking these medications      ondansetron 4 MG disintegrating tablet  Commonly known as: Zofran ODT               Where to Get Your Medications        These medications were sent to [facility] [address], [place] [address]      Phone: [phone]   loperamide 2 MG capsule         No discharge procedures on file. Time Spent on discharge is  39 mins in patient examination, evaluation, counseling as well as medication reconciliation, prescriptions for required medications, discharge plan and follow up. Electronically signed by   PRESTON Thomas NP  3/9/2023  3:26 PM      Thank you Dr. Patrick Pastrana MD for the opportunity to be involved in this patient's care.

## 2023-03-09 NOTE — PROGRESS NOTES
Pt had a good night overall. Pt had two episodes of nausea coupled with abdominal cramping and pain, which was managed with prn flexeril, compazine, and zofran. Pt is able to tolerate small amounts of water with her oral medications and does well with lemon-lime soda but does not tolerate ginger ale. Pt is currently menstruating giving her urine a bloody/red appearance, pt reports urine is yellow and clear. Pt is able to ambulate independently to the bathroom safely. Pt remained free of falls throughout shift. All questions answered, pt resting in bed at this time, wcm.

## 2023-03-09 NOTE — PLAN OF CARE
Problem: Discharge Planning  Goal: Discharge to home or other facility with appropriate resources  3/9/2023 1526 by Pamela Gilmore RN  Outcome: Adequate for Discharge     Problem: Pain  Goal: Verbalizes/displays adequate comfort level or baseline comfort level  3/9/2023 1526 by Pamela Gilmore RN  Outcome: Adequate for Discharge     Problem: Gastrointestinal - Adult  Goal: Minimal or absence of nausea and vomiting  3/9/2023 1526 by Pamela Gilmore RN  Outcome: Adequate for Discharge     Problem: Gastrointestinal - Adult  Goal: Maintains or returns to baseline bowel function  3/9/2023 1526 by Pamela Gilmore RN  Outcome: Adequate for Discharge     Problem: Gastrointestinal - Adult  Goal: Maintains adequate nutritional intake  3/9/2023 1526 by Pamela Gilmore RN  Outcome: Adequate for Discharge     Problem: Metabolic/Fluid and Electrolytes - Adult  Goal: Electrolytes maintained within normal limits  3/9/2023 1526 by Pamela Gilmore RN  Outcome: Adequate for Discharge     Problem: Safety - Adult  Goal: Free from fall injury  3/9/2023 1526 by Pamela Gilmore RN  Outcome: Adequate for Discharge

## 2023-03-09 NOTE — CARE COORDINATION
03/09/23 1124   Service Assessment   Patient Orientation Alert and Oriented;Person;Place;Situation;Self   History Provided By Patient   Primary [address] is: Legal Next of Kin   PCP Verified by CM Yes   Last Visit to PCP Within last 6 months   Prior Functional Level Independent in ADLs/IADLs   Current Functional Level Independent in ADLs/IADLs   Can patient return to prior living arrangement Yes   Ability to make needs known: Good   Family able to assist with home care needs: Yes   Would you like for me to discuss the discharge plan with any other family members/significant others, and if so, who? No   Financial Resources None   Community Resources None   Social/Functional History   Lives With Family   Type of Home Apartment   Home Layout One level   Home Access Stairs to enter with rails   Entrance Stairs - Number of Steps 4 steps   Bathroom Shower/Tub Tub/Shower unit   Bathroom Toilet Standard   Bathroom Equipment None   Bathroom Accessibility Accessible   Home Equipment None   Receives Help From Family   ADL Assistance Independent   Homemaking Assistance Independent   Ambulation Assistance Independent   Transfer Assistance Independent   Active  No   Mode of Transportation Bus   Occupation Unemployed   Discharge Planning   Living Arrangements Children   Current Services Prior To Admission None   Potential Assistance Needed N/A   DME Ordered? No   Type of Home Care Services None   Patient expects to be discharged to: Apartment   Follow Up Appointment: Best Day/Time  Monday AM   One/Two Story Residence One story   History of falls? 0   Services At/After Discharge   Transition of Care Consult (CM Consult) N/A   Services At/After Discharge None   [employer] Information Provided?  No   Mode of Transport at Discharge Other (see comment)  (family)   Confirm Follow Up Transport Family   Condition of Participation: Discharge Planning The Plan for Transition of Care is related to the following treatment goals: home independently   The Patient and/or Patient Representative was provided with a Choice of Provider? Patient   The Patient and/Or Patient Representative agree with the Discharge Plan? Yes   Freedom of Choice list was provided with basic dialogue that supports the patient's individualized plan of care/goals, treatment preferences, and shares the quality data associated with the providers? Yes   Abdominal pain. N/V. Hx of seizures and GERD. Declines any skilled needs. Independent. No DME use. Possible GI consult. Continue to follow. Uses [facility] on [address] and [address] [place] [address].

## 2023-03-09 NOTE — DISCHARGE INSTR - COC
Continuity of Care Form    Patient Name: Krys Metcalf   :  1980  MRN:  [id number]    Admit date:  3/8/2023  Discharge date:  ***    Code Status Order: Full Code   Advance Directives:     Admitting Physician:  Dale Garcia MD  PCP: Stacey Calix MD    Discharging Nurse: [facility] Unit/Room#:   Discharging Unit Phone Number: ***    Emergency Contact:   Extended Emergency Contact Information  Primary Emergency Contact: Gissell Ely  Address: [address] [place] [address] [place], [address] [place] [address] Phone: [phone]  Work Phone: [phone]  Mobile Phone: [phone]  Relation: Parent  Hearing or visual needs: None  Other needs: None  Preferred language: English   needed? No  Secondary Emergency Contact: [facility] Phone: [phone]  Work Phone: [phone]  Mobile Phone: [phone]  Relation: Brother/Sister  Hearing or visual needs: None  Other needs: None  Preferred language: English   needed?  No    Past Surgical History:  Past Surgical History:   Procedure Laterality Date     SECTION          TUBAL LIGATION          UPPER GASTROINTESTINAL ENDOSCOPY N/A 10/9/2021    EGD BIOPSY performed by Kaylee Azul MD at [address] [place] [address]       Immunization History:   Immunization History   Administered Date(s) Administered    COVID-19, MODERNA [place], Primary or Immunocompromised, (age 12y+), IM, 100 mcg/0.5mL 2021, 2021    Influenza A ([id number]) Vaccine PF IM 2010    Influenza Vaccine, unspecified formulation 2017, 10/16/2017    Influenza Virus Vaccine 2014, 10/15/2014, 2015    Influenza, AFLURIA (age 1 yrs+), FLUZONE, (age 10 mo+), MDV, 0.5mL 2014, 2017, 10/16/2017, 2019    Influenza, FLUARIX, FLULAVAL, FLUZONE (age 10 mo+) AND AFLURIA, (age 1 y+), PF, 0.5mL 10/19/2021, 10/11/2022    Pneumococcal Polysaccharide ([id number]) 2017    Tdap (Boostrix, Adacel) 09/13/2016       Active Problems:  Patient Active Problem List   Diagnosis Code    GERD (gastroesophageal reflux disease) K21.9    Mental retardation F79    History of seizure disorder Z86.69    Moderate persistent asthma without complication J55.57    Gastroesophageal reflux disease with esophagitis without hemorrhage K21.00    Intractable vomiting R11.10    Intractable vomiting with nausea R11.2       Isolation/Infection:   Isolation            Contact          Patient Infection Status       Infection Onset Added Last Indicated Last Indicated By Review Planned Expiration Resolved Resolved By    None active    Resolved    COVID-19 (Rule Out) 10/09/21 10/09/21 10/09/21 COVID-19, Rapid (Ordered)   10/09/21 Rule-Out Test Resulted    COVID-19 (Rule Out) 03/16/21 03/16/21 03/16/21 COVID-19 (Ordered)   03/17/21 Rule-Out Test Resulted            Nurse Assessment:  Last Vital Signs: /70   Pulse 50   Temp 99 °F (37.2 °C) (Oral)   Resp 16   Ht 5' 2\" (1.575 m)   Wt 106 lb (48.1 kg)   LMP 03/08/2023   SpO2 98%   BMI 19.39 kg/m²     Last documented pain score (0-10 scale): Pain Level: 4  Last Weight:   Wt Readings from Last 1 Encounters:   03/09/23 106 lb (48.1 kg)     Mental Status:  {IP PT MENTAL STATUS:20030}    IV Access:  { ZHANG IV ACCESS:370714496}    Nursing Mobility/ADLs:  Walking   {[facility] DME DPFJ:115338366}  Transfer  {[facility] DME QINI:980692531}  Bathing  {[facility] DME XTSM:251807575}  Dressing  {[facility] DME FESS:031731717}  Toileting  {[facility] DME ERNW:806039485}  Feeding  {[facility] DME NROO:670434191}  Med Admin  {[facility] DME FLPW:149809089}  Med Delivery   { ZHANG MED Delivery:915444990}    Wound Care Documentation and Therapy:        Elimination:  Continence:    Bowel: {YES / MI:98359}  Bladder: {YES / TB:99148}  Urinary Catheter: {Urinary Catheter:692964008}   Colostomy/Ileostomy/Ileal Conduit: {YES / ED:72979}       Date of Last BM: ***    Intake/Output Summary (Last 24 hours) at 3/9/2023 9944  Last data filed at 3/9/2023 0602  Gross per 24 hour   Intake --   Output 1000 ml   Net -1000 ml     I/O last 3 completed shifts:  In: -   Out: 1000 [Urine:1000]    Safety Concerns:     [address] Safety Concerns:292951758}    Impairments/Disabilities:      [address] Impairments/Disabilities:939159640}    Nutrition Therapy:  Current Nutrition Therapy:   [address] Diet List:893127980}    Routes of Feeding: {CHP DME Other Feedings:943526273}  Liquids: {Slp liquid thickness:07364}  Daily Fluid Restriction: {CHP DME Yes amt example:233900666}  Last Modified Barium Swallow with Video (Video Swallowing Test): {Done Not Done LLLX:598560283}    Treatments at the Time of Hospital Discharge:   Respiratory Treatments: ***  Oxygen Therapy:  {Therapy; copd oxygen:83912}  Ventilator:    {MH CC Vent WGQJ:353473052}    Rehab Therapies: {THERAPEUTIC INTERVENTION:7108856610}  Weight Bearing Status/Restrictions: [address]  Weight Bearin}  Other Medical Equipment (for information only, NOT a DME order):  {EQUIPMENT:403978531}  Other Treatments: ***    Patient's personal belongings (please select all that are sent with patient):  {[facility] DME Belongings:987699218}    RN SIGNATURE:  {Esignature:250856300}    CASE MANAGEMENT/SOCIAL WORK SECTION    Inpatient Status Date: ***    Readmission Risk Assessment Score:  Readmission Risk              Risk of Unplanned Readmission:  0           Discharging to Facility/ Agency   Name:   Address:  Phone:  Fax:    Dialysis Facility (if applicable)   Name:  Address:  Dialysis Schedule:  Phone:  Fax:    / signature: {Esignature:266426872}    PHYSICIAN SECTION    Prognosis: {Prognosis:9084786563}    Condition at Discharge: [address] Patient Condition:177403419}    Rehab Potential (if transferring to Rehab): {Prognosis:8978683596}    Recommended Labs or Other Treatments After Discharge: ***    Physician Certification: I certify the above information and transfer of Leslieloyd Josias  is necessary for the continuing treatment of the diagnosis listed and that she requires {Admit to Appropriate Level of Care:80783} for {GREATER/LESS:697259291} 30 days.      Update Admission H&P: {CHP DME Changes in GZPWV:344807157}    PHYSICIAN SIGNATURE:  {Esignature:504174998}

## 2023-03-09 NOTE — PROGRESS NOTES
Discharge instructions done with pt at bedside. PIV removed and pt dressed. All questions and concerns addressed. Prescriptions sent to [address] [place] [address] on [facility] per request of pt, NP [facility] notified. All belongings sent with pt and pt declined a wheelchair to ride at this time and walked off unit.

## 2023-03-20 DIAGNOSIS — M54.2 NECK PAIN ON LEFT SIDE: ICD-10-CM

## 2023-03-20 RX ORDER — CYCLOBENZAPRINE HCL 5 MG
TABLET ORAL
Qty: 20 TABLET | Refills: 0 | Status: CANCELLED | OUTPATIENT
Start: 2023-03-20

## 2023-03-20 NOTE — TELEPHONE ENCOUNTER
Health Maintenance   Topic Date Due    Cervical cancer screen  02/05/2021    COVID-19 Vaccine (3 - Booster for Moderna series) 08/28/2021    Depression Screen  01/24/2024    Lipids  06/11/2025    DTaP/Tdap/Td vaccine (2 - Td or Tdap) 09/13/2026    Flu vaccine  Completed    Pneumococcal 0-64 years Vaccine  Completed    Hepatitis C screen  Completed    HIV screen  Completed    Hepatitis A vaccine  Aged Out    Hib vaccine  Aged Out    Meningococcal (ACWY) vaccine  Aged Out    Varicella vaccine  Discontinued             (applicable per patient's age: Cancer Screenings, Depression Screening, Fall Risk Screening, Immunizations)    LDL Cholesterol (mg/dL)   Date Value   06/11/2020 97     AST (U/L)   Date Value   03/09/2023 29     ALT (U/L)   Date Value   03/09/2023 20     BUN (mg/dL)   Date Value   03/09/2023 7      (goal A1C is < 7)   (goal LDL is <100) need 30-50% reduction from baseline     BP Readings from Last 3 Encounters:   03/09/23 138/70   01/24/23 130/80   11/09/22 (!) 146/93    (goal /80)      All Future Testing planned in [facility]:  Lab Frequency Next Occurrence   Basic Metabolic Panel Once 81/76/5395       Next Visit Date:  Future Appointments   Date Time Provider Rachel Adkins   5/2/2023 11:00 AM Dami Green MD [facility]            Patient Active Problem List:     GERD (gastroesophageal reflux disease)     Mental retardation     History of seizure disorder     Moderate persistent asthma without complication     Gastroesophageal reflux disease with esophagitis without hemorrhage     Intractable vomiting     Intractable vomiting with nausea

## 2023-03-21 RX ORDER — ACETAMINOPHEN 500 MG
500 TABLET ORAL 4 TIMES DAILY PRN
Qty: 120 TABLET | Refills: 0 | Status: SHIPPED | OUTPATIENT
Start: 2023-03-21

## 2023-03-21 NOTE — TELEPHONE ENCOUNTER
Cannot refill Flexeril as it can be given for 2 to 3 weeks and patient received it for 20 days. If the neck pain persists she can take Tylenol as needed which has been ordered. Please let the patient know.

## 2023-04-20 DIAGNOSIS — M54.2 NECK PAIN ON LEFT SIDE: ICD-10-CM

## 2023-04-20 RX ORDER — ACETAMINOPHEN 500 MG
TABLET ORAL
Qty: 120 TABLET | Refills: 0 | Status: SHIPPED | OUTPATIENT
Start: 2023-04-20

## 2023-04-20 NOTE — TELEPHONE ENCOUNTER
Medication refill for Tylenol    Health Maintenance   Topic Date Due    Cervical cancer screen  02/05/2021    COVID-19 Vaccine (3 - Booster for Moderna series) 08/28/2021    Depression Screen  01/24/2024    Lipids  06/11/2025    DTaP/Tdap/Td vaccine (2 - Td or Tdap) 09/13/2026    Flu vaccine  Completed    Pneumococcal 0-64 years Vaccine  Completed    Hepatitis C screen  Completed    HIV screen  Completed    Hepatitis A vaccine  Aged Out    Hib vaccine  Aged Out    Meningococcal (ACWY) vaccine  Aged Out    Varicella vaccine  Discontinued             (applicable per patient's age: Cancer Screenings, Depression Screening, Fall Risk Screening, Immunizations)    LDL Cholesterol (mg/dL)   Date Value   06/11/2020 97     AST (U/L)   Date Value   03/09/2023 29     ALT (U/L)   Date Value   03/09/2023 20     BUN (mg/dL)   Date Value   03/09/2023 7      (goal A1C is < 7)   (goal LDL is <100) need 30-50% reduction from baseline     BP Readings from Last 3 Encounters:   03/09/23 138/70   01/24/23 130/80   11/09/22 (!) 146/93    (goal /80)      All Future Testing planned in [facility]:  Lab Frequency Next Occurrence   Basic Metabolic Panel Once 18/08/6494       Next Visit Date:  Future Appointments   Date Time Provider Rachel Carreroisti   5/2/2023 11:00 AM Agnes Hermosillo MD [facility]   7/5/2023  1:40 PM Nikolas Talamantes MD [facility] -            Patient Active Problem List:     GERD (gastroesophageal reflux disease)     Mental retardation     History of seizure disorder     Moderate persistent asthma without complication     Gastroesophageal reflux disease with esophagitis without hemorrhage     Intractable vomiting     Intractable vomiting with nausea

## 2023-05-02 ENCOUNTER — OFFICE VISIT (OUTPATIENT)
Dept: INTERNAL MEDICINE | Age: 43
End: 2023-05-02
Payer: COMMERCIAL

## 2023-05-02 VITALS
HEART RATE: 79 BPM | HEIGHT: 62 IN | BODY MASS INDEX: 20.06 KG/M2 | SYSTOLIC BLOOD PRESSURE: 120 MMHG | DIASTOLIC BLOOD PRESSURE: 79 MMHG | WEIGHT: 109 LBS

## 2023-05-02 DIAGNOSIS — R05.3 CHRONIC COUGH: ICD-10-CM

## 2023-05-02 DIAGNOSIS — E55.9 VITAMIN D DEFICIENCY: ICD-10-CM

## 2023-05-02 DIAGNOSIS — R53.83 FATIGUE, UNSPECIFIED TYPE: Primary | ICD-10-CM

## 2023-05-02 DIAGNOSIS — R19.7 DIARRHEA, UNSPECIFIED TYPE: ICD-10-CM

## 2023-05-02 DIAGNOSIS — R11.2 INTRACTABLE NAUSEA AND VOMITING: ICD-10-CM

## 2023-05-02 DIAGNOSIS — J45.20 MILD INTERMITTENT REACTIVE AIRWAY DISEASE WITHOUT COMPLICATION: ICD-10-CM

## 2023-05-02 DIAGNOSIS — K21.9 GASTROESOPHAGEAL REFLUX DISEASE WITHOUT ESOPHAGITIS: ICD-10-CM

## 2023-05-02 DIAGNOSIS — Z86.69 HISTORY OF SEIZURE DISORDER: ICD-10-CM

## 2023-05-02 DIAGNOSIS — G89.29 CHRONIC LEFT-SIDED HEADACHE: ICD-10-CM

## 2023-05-02 DIAGNOSIS — R51.9 CHRONIC LEFT-SIDED HEADACHE: ICD-10-CM

## 2023-05-02 DIAGNOSIS — R73.9 HYPERGLYCEMIA: ICD-10-CM

## 2023-05-02 DIAGNOSIS — R56.01 COMPLEX FEBRILE CONVULSION (HCC): ICD-10-CM

## 2023-05-02 PROBLEM — K21.00 GASTROESOPHAGEAL REFLUX DISEASE WITH ESOPHAGITIS WITHOUT HEMORRHAGE: Status: RESOLVED | Noted: 2021-10-09 | Resolved: 2023-05-02

## 2023-05-02 PROCEDURE — G8427 DOCREV CUR MEDS BY ELIG CLIN: HCPCS

## 2023-05-02 PROCEDURE — 99213 OFFICE O/P EST LOW 20 MIN: CPT

## 2023-05-02 PROCEDURE — 99211 OFF/OP EST MAY X REQ PHY/QHP: CPT | Performed by: STUDENT IN AN ORGANIZED HEALTH CARE EDUCATION/TRAINING PROGRAM

## 2023-05-02 PROCEDURE — 1036F TOBACCO NON-USER: CPT

## 2023-05-02 PROCEDURE — G8420 CALC BMI NORM PARAMETERS: HCPCS

## 2023-05-02 RX ORDER — MELATONIN
1 DAILY
Qty: 30 TABLET | Refills: 3 | Status: SHIPPED | OUTPATIENT
Start: 2023-05-02

## 2023-05-02 RX ORDER — MULTIVITAMIN,THERAPEUTIC
1 TABLET ORAL DAILY
Qty: 30 TABLET | Refills: 3 | Status: SHIPPED | OUTPATIENT
Start: 2023-05-02

## 2023-05-02 RX ORDER — FLUTICASONE PROPIONATE 220 UG/1
2 AEROSOL, METERED RESPIRATORY (INHALATION) 2 TIMES DAILY
Qty: 12 G | Refills: 3 | Status: SHIPPED | OUTPATIENT
Start: 2023-05-02 | End: 2024-05-01

## 2023-05-02 RX ORDER — PANTOPRAZOLE SODIUM 40 MG/1
TABLET, DELAYED RELEASE ORAL
Qty: 30 TABLET | Refills: 3 | Status: SHIPPED | OUTPATIENT
Start: 2023-05-02

## 2023-05-02 RX ORDER — ACETAMINOPHEN 500 MG
500 TABLET ORAL 4 TIMES DAILY PRN
Qty: 120 TABLET | Refills: 0 | Status: CANCELLED | OUTPATIENT
Start: 2023-05-02

## 2023-05-02 RX ORDER — ONDANSETRON 4 MG/1
4 TABLET, FILM COATED ORAL 3 TIMES DAILY PRN
Qty: 15 TABLET | Refills: 3 | Status: SHIPPED | OUTPATIENT
Start: 2023-05-02

## 2023-05-02 RX ORDER — ALBUTEROL SULFATE 90 UG/1
AEROSOL, METERED RESPIRATORY (INHALATION)
Qty: 8.5 G | Refills: 3 | Status: SHIPPED | OUTPATIENT
Start: 2023-05-02

## 2023-05-02 RX ORDER — FLUTICASONE PROPIONATE 220 UG/1
AEROSOL, METERED RESPIRATORY (INHALATION)
Qty: 12 G | Refills: 3 | Status: CANCELLED | OUTPATIENT
Start: 2023-05-02

## 2023-05-02 RX ORDER — LEVETIRACETAM 750 MG/1
TABLET ORAL
Qty: 180 TABLET | Refills: 1 | Status: CANCELLED | OUTPATIENT
Start: 2023-05-02

## 2023-05-02 RX ORDER — CALORIC SUPPLEMENT
1 LIQUID (ML) ORAL DAILY
Qty: 30 EACH | Refills: 3 | Status: SHIPPED | OUTPATIENT
Start: 2023-05-02

## 2023-05-02 SDOH — ECONOMIC STABILITY: INCOME INSECURITY: HOW HARD IS IT FOR YOU TO PAY FOR THE VERY BASICS LIKE FOOD, HOUSING, MEDICAL CARE, AND HEATING?: SOMEWHAT HARD

## 2023-05-02 SDOH — ECONOMIC STABILITY: FOOD INSECURITY: WITHIN THE PAST 12 MONTHS, YOU WORRIED THAT YOUR FOOD WOULD RUN OUT BEFORE YOU GOT MONEY TO BUY MORE.: NEVER TRUE

## 2023-05-02 SDOH — ECONOMIC STABILITY: FOOD INSECURITY: WITHIN THE PAST 12 MONTHS, THE FOOD YOU BOUGHT JUST DIDN'T LAST AND YOU DIDN'T HAVE MONEY TO GET MORE.: NEVER TRUE

## 2023-05-02 SDOH — ECONOMIC STABILITY: HOUSING INSECURITY
IN THE LAST 12 MONTHS, WAS THERE A TIME WHEN YOU DID NOT HAVE A STEADY PLACE TO SLEEP OR SLEPT IN A SHELTER (INCLUDING NOW)?: NO

## 2023-05-02 ASSESSMENT — ENCOUNTER SYMPTOMS
COUGH: 1
SHORTNESS OF BREATH: 1
CHEST TIGHTNESS: 0
SORE THROAT: 0
BLOOD IN STOOL: 0
VOMITING: 1
PHOTOPHOBIA: 0
ABDOMINAL PAIN: 0
EYE DISCHARGE: 0
NAUSEA: 1
EYE PAIN: 0
WHEEZING: 1
RHINORRHEA: 0
DIARRHEA: 1
CONSTIPATION: 0

## 2023-05-02 NOTE — PROGRESS NOTES
Attending Physician Statement  I have discussed the care of [address], including pertinent history and exam findings with the resident. I have reviewed the key elements of all parts of the encounter with the resident. I agree with the assessment, and status of the problem list as documented. Diagnosis Orders   1. Fatigue, unspecified type        2. Intractable nausea and vomiting  Nutritional Supplements (ENSURE ORIGINAL) LIQD    ondansetron (ZOFRAN) 4 MG tablet    Multiple Vitamin (THERA/BETA-CAROTENE) TABS      3. Diarrhea, unspecified type  Nutritional Supplements (ENSURE ORIGINAL) LIQD    Multiple Vitamin (THERA/BETA-CAROTENE) TABS      4. Chronic left-sided headache        5. Mild intermittent reactive airway disease without complication  albuterol sulfate HFA (PROVENTIL;VENTOLIN;PROAIR) 108 (90 Base) MCG/ACT inhaler    fluticasone (FLOVENT HFA) 220 MCG/ACT inhaler      6. Chronic cough  albuterol sulfate HFA (PROVENTIL;VENTOLIN;PROAIR) 108 (90 Base) MCG/ACT inhaler      7. Complex febrile convulsion ([facility] [address]        8. History of seizure disorder        9. Gastroesophageal reflux disease without esophagitis  pantoprazole (PROTONIX) 40 MG tablet      10. Vitamin D deficiency  vitamin D3 (CHOLECALCIFEROL) 25 MCG (1000 UT) TABS tablet    Vitamin D 25 Hydroxy      11. Hyperglycemia  Hemoglobin A1C      Patient is to follow up with Gi referral. Counseled on tobacco abuse and marijuana use. Will think about her triggers but not agreeable at this to quit. She will see her Neurologist in 1 month for f/u on Headaches and Seizures  Asthma not well controlled. Flovent to be used daily and not prn    The plan and orders should include   Orders Placed This Encounter   Procedures    Vitamin D 25 Hydroxy    Hemoglobin A1C    and this was also documented by the resident. The medication list was reviewed with the resident and is up to date. The return visit should be in 6-8 weeks .     Dr Sabine Uriostegui MD, [address]
that this was cyclical vomiting syndrome secondary to marijuana use however she states that she has not been taking marijuana from 36years of age. She was hydrated in the ER and was discharged after her symptoms improved. During the last office visit patient was given a GI referral but she did not go for the appointment. Patient also has a history of smoking and states that she has quit smoking when she was 29years of age. She is supposed to have a Pap smear today but she states that she is on periods today. Past medical history significant for GERD on Protonix, asthma on albuterol inhaler, seizure disorder on levetiracetam, past marijuana use and prior smoking. Patient has followed up with neurology Dr. Gray Negro on 6/3/2022. She has requested refill of her medications including ondansetron, Keppra, albuterol sulfate inhaler and vitamin D3.       Patient Active Problem List   Diagnosis    Mental retardation    History of seizure disorder    Moderate persistent asthma without complication       Allergies   Allergen Reactions    Omeprazole      Pt had a seizure         Current Outpatient Medications   Medication Sig Dispense Refill    albuterol sulfate HFA (PROVENTIL;VENTOLIN;PROAIR) 108 (90 Base) MCG/ACT inhaler INHALE TWO PUFFS BY MOUTH EVERY 6 HOURS AS NEEDED FOR WHEEZING 8.5 g 3    Nutritional Supplements (ENSURE ORIGINAL) LIQD Take 1 each by mouth daily 30 each 3    ondansetron (ZOFRAN) 4 MG tablet Take 1 tablet by mouth 3 times daily as needed for Nausea or Vomiting 15 tablet 3    pantoprazole (PROTONIX) 40 MG tablet TAKE ONE TABLET BY MOUTH EVERY MORNING BEFORE BREAKFAST 30 tablet 3    vitamin D3 (CHOLECALCIFEROL) 25 MCG (1000 UT) TABS tablet Take 1 tablet by mouth daily 30 tablet 3    Multiple Vitamin (THERA/BETA-CAROTENE) TABS Take 1 tablet by mouth daily 30 tablet 3    fluticasone (FLOVENT HFA) 220 MCG/ACT inhaler Inhale 2 puffs into the lungs 2 times daily 12 g 3    acetaminophen (TYLENOL) 500 MG

## 2023-05-06 DIAGNOSIS — R11.2 INTRACTABLE NAUSEA AND VOMITING: ICD-10-CM

## 2023-05-06 DIAGNOSIS — R19.7 DIARRHEA, UNSPECIFIED TYPE: ICD-10-CM

## 2023-05-08 ENCOUNTER — HOSPITAL ENCOUNTER (INPATIENT)
Age: 43
LOS: 3 days | Discharge: HOME | DRG: 392 | End: 2023-05-11
Attending: EMERGENCY MEDICINE | Admitting: FAMILY MEDICINE
Payer: COMMERCIAL

## 2023-05-08 ENCOUNTER — APPOINTMENT (OUTPATIENT)
Dept: GENERAL RADIOLOGY | Age: 43
DRG: 392 | End: 2023-05-08
Payer: COMMERCIAL

## 2023-05-08 ENCOUNTER — APPOINTMENT (OUTPATIENT)
Dept: CT IMAGING | Age: 43
DRG: 392 | End: 2023-05-08
Payer: COMMERCIAL

## 2023-05-08 DIAGNOSIS — R10.84 GENERALIZED ABDOMINAL PAIN: ICD-10-CM

## 2023-05-08 DIAGNOSIS — Q61.3 POLYCYSTIC KIDNEY DISEASE: ICD-10-CM

## 2023-05-08 DIAGNOSIS — R11.2 INTRACTABLE NAUSEA AND VOMITING: Primary | ICD-10-CM

## 2023-05-08 DIAGNOSIS — R10.9 ABDOMINAL PAIN, UNSPECIFIED ABDOMINAL LOCATION: ICD-10-CM

## 2023-05-08 PROBLEM — R11.15 CYCLICAL VOMITING: Status: ACTIVE | Noted: 2023-05-08

## 2023-05-08 PROBLEM — K83.09 CHOLANGITIS: Status: ACTIVE | Noted: 2023-05-08

## 2023-05-08 LAB
ABSOLUTE EOS #: 0.07 K/UL (ref 0–0.44)
ABSOLUTE IMMATURE GRANULOCYTE: 0.01 K/UL (ref 0–0.3)
ABSOLUTE LYMPH #: 2.16 K/UL (ref 1.1–3.7)
ABSOLUTE MONO #: 0.25 K/UL (ref 0.1–1.2)
ACETAMINOPHEN LEVEL: <10 UG/ML (ref 10–30)
ALBUMIN SERPL-MCNC: 4.8 G/DL (ref 3.5–5.2)
ALP SERPL-CCNC: 42 U/L (ref 35–104)
ALT SERPL-CCNC: 14 U/L (ref 5–33)
AMPHETAMINE SCREEN URINE: NEGATIVE
ANION GAP SERPL CALCULATED.3IONS-SCNC: 14 MMOL/L (ref 9–17)
AST SERPL-CCNC: 19 U/L
BARBITURATE SCREEN URINE: NEGATIVE
BASOPHILS # BLD: 1 % (ref 0–2)
BASOPHILS ABSOLUTE: 0.05 K/UL (ref 0–0.2)
BENZODIAZEPINE SCREEN, URINE: NEGATIVE
BILIRUB SERPL-MCNC: 0.4 MG/DL (ref 0.3–1.2)
BILIRUBIN URINE: NEGATIVE
BUN SERPL-MCNC: 11 MG/DL (ref 6–20)
BUN/CREAT BLD: 15 (ref 9–20)
CALCIUM SERPL-MCNC: 9.5 MG/DL (ref 8.6–10.4)
CANNABINOID SCREEN URINE: POSITIVE
CHLORIDE SERPL-SCNC: 102 MMOL/L (ref 98–107)
CO2 SERPL-SCNC: 23 MMOL/L (ref 20–31)
COCAINE METABOLITE, URINE: POSITIVE
COLOR: YELLOW
CREAT SERPL-MCNC: 0.72 MG/DL (ref 0.5–0.9)
EOSINOPHILS RELATIVE PERCENT: 1 % (ref 1–4)
ETHANOL PERCENT: <0.01 %
ETHANOL: <10 MG/DL
FENTANYL URINE: NEGATIVE
GFR SERPL CREATININE-BSD FRML MDRD: >60 ML/MIN/1.73M2
GLUCOSE SERPL-MCNC: 127 MG/DL (ref 70–99)
GLUCOSE UR STRIP.AUTO-MCNC: NEGATIVE MG/DL
HAV IGM SER QL: NONREACTIVE
HBV CORE IGM SER QL: NONREACTIVE
HBV SURFACE AG SER QL: NONREACTIVE
HCG QUALITATIVE: NEGATIVE
HCT VFR BLD AUTO: 41.2 % (ref 36.3–47.1)
HCV AB SER QL: NONREACTIVE
HGB BLD-MCNC: 14.9 G/DL (ref 11.9–15.1)
IMMATURE GRANULOCYTES: 0 %
KETONES UR STRIP.AUTO-MCNC: ABNORMAL MG/DL
LEUKOCYTE ESTERASE UR QL STRIP.AUTO: NEGATIVE
LIPASE SERPL-CCNC: 17 U/L (ref 13–60)
LYMPHOCYTES # BLD: 32 % (ref 24–43)
MCH RBC QN AUTO: 31.2 PG (ref 25.2–33.5)
MCHC RBC AUTO-ENTMCNC: 36.2 G/DL (ref 28.4–34.8)
MCV RBC AUTO: 86.2 FL (ref 82.6–102.9)
METHADONE SCREEN, URINE: NEGATIVE
MONOCYTES # BLD: 4 % (ref 3–12)
NITRITE UR QL STRIP.AUTO: NEGATIVE
NRBC AUTOMATED: 0 PER 100 WBC
OPIATES, URINE: POSITIVE
OXYCODONE SCREEN URINE: NEGATIVE
PDW BLD-RTO: 12 % (ref 11.8–14.4)
PHENCYCLIDINE, URINE: NEGATIVE
PLATELET # BLD AUTO: 352 K/UL (ref 138–453)
PMV BLD AUTO: 10.5 FL (ref 8.1–13.5)
POTASSIUM SERPL-SCNC: 3.6 MMOL/L (ref 3.7–5.3)
PROT SERPL-MCNC: 7.6 G/DL (ref 6.4–8.3)
PROT UR STRIP.AUTO-MCNC: 7 MG/DL (ref 5–8)
PROT UR STRIP.AUTO-MCNC: NEGATIVE MG/DL
RBC # BLD: 4.78 M/UL (ref 3.95–5.11)
SALICYLATE LEVEL: <1 MG/DL (ref 3–10)
SEG NEUTROPHILS: 62 % (ref 36–65)
SEGMENTED NEUTROPHILS ABSOLUTE COUNT: 4.14 K/UL (ref 1.5–8.1)
SODIUM SERPL-SCNC: 139 MMOL/L (ref 135–144)
SPECIFIC GRAVITY UA: 1.01 (ref 1–1.03)
TEST INFORMATION: ABNORMAL
TOXIC TRICYCLIC SC,BLOOD: NEGATIVE
TURBIDITY: CLEAR
URINE HGB: NEGATIVE
UROBILINOGEN, URINE: NORMAL
WBC # BLD AUTO: 6.7 K/UL (ref 3.5–11.3)

## 2023-05-08 PROCEDURE — 99222 1ST HOSP IP/OBS MODERATE 55: CPT | Performed by: NURSE PRACTITIONER

## 2023-05-08 PROCEDURE — 6360000002 HC RX W HCPCS: Performed by: PHYSICIAN ASSISTANT

## 2023-05-08 PROCEDURE — 80074 ACUTE HEPATITIS PANEL: CPT

## 2023-05-08 PROCEDURE — 80143 DRUG ASSAY ACETAMINOPHEN: CPT

## 2023-05-08 PROCEDURE — 2580000003 HC RX 258: Performed by: PHYSICIAN ASSISTANT

## 2023-05-08 PROCEDURE — 81003 URINALYSIS AUTO W/O SCOPE: CPT

## 2023-05-08 PROCEDURE — 74177 CT ABD & PELVIS W/CONTRAST: CPT

## 2023-05-08 PROCEDURE — 71045 X-RAY EXAM CHEST 1 VIEW: CPT

## 2023-05-08 PROCEDURE — 2580000003 HC RX 258: Performed by: NURSE PRACTITIONER

## 2023-05-08 PROCEDURE — G0480 DRUG TEST DEF 1-7 CLASSES: HCPCS

## 2023-05-08 PROCEDURE — 1200000000 HC SEMI PRIVATE

## 2023-05-08 PROCEDURE — 2500000003 HC RX 250 WO HCPCS: Performed by: NURSE PRACTITIONER

## 2023-05-08 PROCEDURE — 6360000002 HC RX W HCPCS: Performed by: NURSE PRACTITIONER

## 2023-05-08 PROCEDURE — 96375 TX/PRO/DX INJ NEW DRUG ADDON: CPT

## 2023-05-08 PROCEDURE — 85025 COMPLETE CBC W/AUTO DIFF WBC: CPT

## 2023-05-08 PROCEDURE — 80307 DRUG TEST PRSMV CHEM ANLYZR: CPT

## 2023-05-08 PROCEDURE — 84703 CHORIONIC GONADOTROPIN ASSAY: CPT

## 2023-05-08 PROCEDURE — 80179 DRUG ASSAY SALICYLATE: CPT

## 2023-05-08 PROCEDURE — 80053 COMPREHEN METABOLIC PANEL: CPT

## 2023-05-08 PROCEDURE — 6360000004 HC RX CONTRAST MEDICATION: Performed by: PHYSICIAN ASSISTANT

## 2023-05-08 PROCEDURE — 96361 HYDRATE IV INFUSION ADD-ON: CPT

## 2023-05-08 PROCEDURE — A4216 STERILE WATER/SALINE, 10 ML: HCPCS | Performed by: NURSE PRACTITIONER

## 2023-05-08 PROCEDURE — 2500000003 HC RX 250 WO HCPCS: Performed by: PHYSICIAN ASSISTANT

## 2023-05-08 PROCEDURE — 99285 EMERGENCY DEPT VISIT HI MDM: CPT

## 2023-05-08 PROCEDURE — 96374 THER/PROPH/DIAG INJ IV PUSH: CPT

## 2023-05-08 PROCEDURE — 83690 ASSAY OF LIPASE: CPT

## 2023-05-08 PROCEDURE — 93005 ELECTROCARDIOGRAM TRACING: CPT | Performed by: EMERGENCY MEDICINE

## 2023-05-08 RX ORDER — ONDANSETRON 2 MG/ML
4 INJECTION INTRAMUSCULAR; INTRAVENOUS EVERY 6 HOURS PRN
Status: DISCONTINUED | OUTPATIENT
Start: 2023-05-08 | End: 2023-05-11 | Stop reason: HOSPADM

## 2023-05-08 RX ORDER — SODIUM CHLORIDE 0.9 % (FLUSH) 0.9 %
5-40 SYRINGE (ML) INJECTION EVERY 12 HOURS SCHEDULED
Status: DISCONTINUED | OUTPATIENT
Start: 2023-05-08 | End: 2023-05-11 | Stop reason: HOSPADM

## 2023-05-08 RX ORDER — CIPROFLOXACIN 2 MG/ML
400 INJECTION, SOLUTION INTRAVENOUS EVERY 12 HOURS
Status: DISCONTINUED | OUTPATIENT
Start: 2023-05-08 | End: 2023-05-09

## 2023-05-08 RX ORDER — MORPHINE SULFATE 4 MG/ML
4 INJECTION, SOLUTION INTRAMUSCULAR; INTRAVENOUS ONCE
Status: COMPLETED | OUTPATIENT
Start: 2023-05-08 | End: 2023-05-08

## 2023-05-08 RX ORDER — ONDANSETRON 2 MG/ML
4 INJECTION INTRAMUSCULAR; INTRAVENOUS ONCE
Status: COMPLETED | OUTPATIENT
Start: 2023-05-08 | End: 2023-05-08

## 2023-05-08 RX ORDER — METRONIDAZOLE 500 MG/100ML
500 INJECTION, SOLUTION INTRAVENOUS EVERY 8 HOURS
Status: DISCONTINUED | OUTPATIENT
Start: 2023-05-08 | End: 2023-05-09

## 2023-05-08 RX ORDER — SODIUM CHLORIDE 9 MG/ML
INJECTION, SOLUTION INTRAVENOUS CONTINUOUS
Status: DISCONTINUED | OUTPATIENT
Start: 2023-05-08 | End: 2023-05-11 | Stop reason: HOSPADM

## 2023-05-08 RX ORDER — ACETAMINOPHEN 325 MG/1
650 TABLET ORAL EVERY 6 HOURS PRN
Status: DISCONTINUED | OUTPATIENT
Start: 2023-05-08 | End: 2023-05-11 | Stop reason: HOSPADM

## 2023-05-08 RX ORDER — 0.9 % SODIUM CHLORIDE 0.9 %
80 INTRAVENOUS SOLUTION INTRAVENOUS ONCE
Status: COMPLETED | OUTPATIENT
Start: 2023-05-08 | End: 2023-05-08

## 2023-05-08 RX ORDER — SODIUM CHLORIDE 0.9 % (FLUSH) 0.9 %
10 SYRINGE (ML) INJECTION ONCE
Status: COMPLETED | OUTPATIENT
Start: 2023-05-08 | End: 2023-05-08

## 2023-05-08 RX ORDER — SODIUM CHLORIDE 9 MG/ML
INJECTION, SOLUTION INTRAVENOUS PRN
Status: DISCONTINUED | OUTPATIENT
Start: 2023-05-08 | End: 2023-05-11 | Stop reason: HOSPADM

## 2023-05-08 RX ORDER — ALBUTEROL SULFATE 90 UG/1
2 AEROSOL, METERED RESPIRATORY (INHALATION) EVERY 4 HOURS PRN
Status: DISCONTINUED | OUTPATIENT
Start: 2023-05-08 | End: 2023-05-11 | Stop reason: HOSPADM

## 2023-05-08 RX ORDER — MORPHINE SULFATE 2 MG/ML
2 INJECTION, SOLUTION INTRAMUSCULAR; INTRAVENOUS EVERY 4 HOURS PRN
Status: DISCONTINUED | OUTPATIENT
Start: 2023-05-08 | End: 2023-05-09

## 2023-05-08 RX ORDER — SODIUM CHLORIDE 0.9 % (FLUSH) 0.9 %
5-40 SYRINGE (ML) INJECTION PRN
Status: DISCONTINUED | OUTPATIENT
Start: 2023-05-08 | End: 2023-05-11 | Stop reason: HOSPADM

## 2023-05-08 RX ORDER — ONDANSETRON 4 MG/1
4 TABLET, ORALLY DISINTEGRATING ORAL EVERY 8 HOURS PRN
Status: DISCONTINUED | OUTPATIENT
Start: 2023-05-08 | End: 2023-05-11 | Stop reason: HOSPADM

## 2023-05-08 RX ORDER — POTASSIUM CHLORIDE 7.45 MG/ML
10 INJECTION INTRAVENOUS
Status: COMPLETED | OUTPATIENT
Start: 2023-05-08 | End: 2023-05-08

## 2023-05-08 RX ORDER — ACETAMINOPHEN 650 MG/1
650 SUPPOSITORY RECTAL EVERY 6 HOURS PRN
Status: DISCONTINUED | OUTPATIENT
Start: 2023-05-08 | End: 2023-05-11 | Stop reason: HOSPADM

## 2023-05-08 RX ORDER — LACTOSE-REDUCED FOOD
LIQUID (ML) ORAL
OUTPATIENT
Start: 2023-05-08

## 2023-05-08 RX ORDER — 0.9 % SODIUM CHLORIDE 0.9 %
1000 INTRAVENOUS SOLUTION INTRAVENOUS ONCE
Status: COMPLETED | OUTPATIENT
Start: 2023-05-08 | End: 2023-05-08

## 2023-05-08 RX ORDER — METOCLOPRAMIDE HYDROCHLORIDE 5 MG/ML
10 INJECTION INTRAMUSCULAR; INTRAVENOUS EVERY 6 HOURS
Status: CANCELLED | OUTPATIENT
Start: 2023-05-08

## 2023-05-08 RX ORDER — METOCLOPRAMIDE HYDROCHLORIDE 5 MG/ML
10 INJECTION INTRAMUSCULAR; INTRAVENOUS ONCE
Status: COMPLETED | OUTPATIENT
Start: 2023-05-08 | End: 2023-05-08

## 2023-05-08 RX ADMIN — MORPHINE SULFATE 4 MG: 4 INJECTION, SOLUTION INTRAMUSCULAR; INTRAVENOUS at 10:51

## 2023-05-08 RX ADMIN — LEVETIRACETAM 750 MG: 100 INJECTION, SOLUTION INTRAVENOUS at 21:46

## 2023-05-08 RX ADMIN — IOPAMIDOL 75 ML: 755 INJECTION, SOLUTION INTRAVENOUS at 12:46

## 2023-05-08 RX ADMIN — CIPROFLOXACIN 400 MG: 400 INJECTION, SOLUTION INTRAVENOUS at 19:41

## 2023-05-08 RX ADMIN — SODIUM CHLORIDE, PRESERVATIVE FREE 20 MG: 5 INJECTION INTRAVENOUS at 10:53

## 2023-05-08 RX ADMIN — ONDANSETRON 4 MG: 2 INJECTION INTRAMUSCULAR; INTRAVENOUS at 10:48

## 2023-05-08 RX ADMIN — METRONIDAZOLE 500 MG: 500 INJECTION, SOLUTION INTRAVENOUS at 22:46

## 2023-05-08 RX ADMIN — POTASSIUM CHLORIDE 10 MEQ: 10 INJECTION, SOLUTION INTRAVENOUS at 15:25

## 2023-05-08 RX ADMIN — ONDANSETRON 4 MG: 2 INJECTION INTRAMUSCULAR; INTRAVENOUS at 15:21

## 2023-05-08 RX ADMIN — POTASSIUM CHLORIDE 10 MEQ: 10 INJECTION, SOLUTION INTRAVENOUS at 18:11

## 2023-05-08 RX ADMIN — SODIUM CHLORIDE 80 ML: 9 INJECTION, SOLUTION INTRAVENOUS at 12:46

## 2023-05-08 RX ADMIN — SODIUM CHLORIDE, PRESERVATIVE FREE 10 ML: 5 INJECTION INTRAVENOUS at 12:46

## 2023-05-08 RX ADMIN — SODIUM CHLORIDE 1000 ML: 9 INJECTION, SOLUTION INTRAVENOUS at 10:46

## 2023-05-08 RX ADMIN — METOCLOPRAMIDE HYDROCHLORIDE 10 MG: 5 INJECTION INTRAMUSCULAR; INTRAVENOUS at 12:32

## 2023-05-08 RX ADMIN — SODIUM CHLORIDE, PRESERVATIVE FREE 20 MG: 5 INJECTION INTRAVENOUS at 21:45

## 2023-05-08 RX ADMIN — SODIUM CHLORIDE: 9 INJECTION, SOLUTION INTRAVENOUS at 19:39

## 2023-05-08 ASSESSMENT — PAIN DESCRIPTION - ORIENTATION: ORIENTATION: RIGHT;LEFT;MID

## 2023-05-08 ASSESSMENT — ENCOUNTER SYMPTOMS
NAUSEA: 1
VOMITING: 1
ABDOMINAL PAIN: 1

## 2023-05-08 ASSESSMENT — PAIN DESCRIPTION - LOCATION
LOCATION: ABDOMEN;CHEST
LOCATION: ABDOMEN
LOCATION: CHEST;ABDOMEN
LOCATION: ABDOMEN;CHEST
LOCATION: ABDOMEN;CHEST

## 2023-05-08 ASSESSMENT — PAIN DESCRIPTION - ONSET: ONSET: SUDDEN

## 2023-05-08 ASSESSMENT — PAIN - FUNCTIONAL ASSESSMENT
PAIN_FUNCTIONAL_ASSESSMENT: 0-10
PAIN_FUNCTIONAL_ASSESSMENT: PREVENTS OR INTERFERES WITH MANY ACTIVE NOT PASSIVE ACTIVITIES

## 2023-05-08 ASSESSMENT — PAIN SCALES - GENERAL
PAINLEVEL_OUTOF10: 8
PAINLEVEL_OUTOF10: 10

## 2023-05-08 ASSESSMENT — PAIN DESCRIPTION - DESCRIPTORS: DESCRIPTORS: BURNING;CRAMPING

## 2023-05-08 ASSESSMENT — LIFESTYLE VARIABLES
HOW OFTEN DO YOU HAVE A DRINK CONTAINING ALCOHOL: NEVER
HOW MANY STANDARD DRINKS CONTAINING ALCOHOL DO YOU HAVE ON A TYPICAL DAY: PATIENT DOES NOT DRINK

## 2023-05-08 ASSESSMENT — PAIN DESCRIPTION - FREQUENCY: FREQUENCY: INTERMITTENT

## 2023-05-08 ASSESSMENT — PAIN DESCRIPTION - PAIN TYPE: TYPE: ACUTE PAIN

## 2023-05-08 NOTE — ED TRIAGE NOTES
Pt to ED c/o nausea, vomiting and diarrhea since 5/2/23. Pt AOx4. States chest and upper abdominal pain. Pt to monitor, EKG, IV and blood to lab. Pt tearful and moving all over bed. Patent airway, no dyspnea or cyanosis noted. Emesis x2 since arrival to room. Roscoe PA @ bedside. Bed to lowest position, side rails up x2, call light within reach.

## 2023-05-08 NOTE — ED PROVIDER NOTES
EMERGENCY DEPARTMENT ENCOUNTER   ATTENDING ATTESTATION     Pt Name: Erika Licona  MRN: [id number]  [place] 1980  Date of evaluation: 23   Erika Licona is a 41 y.o. female with CC: Abdominal Pain, Emesis (Onset . ), and Diarrhea    MDM:   The patient is a 37-year-old female who presented to the emergency department secondary to nausea and vomiting. Patient admitted for intractable nausea and vomiting. This visit was performed by both a physician and an APC. I personally evaluated and examined the patient. I performed all aspects of the MDM as documented. CRITICAL CARE:       EKG: All EKG's are interpreted by the Emergency Department Physician who either signs or Co-signs this chart in the absence of a cardiologist.      RADIOLOGY:All plain film, CT, MRI, and formal ultrasound images (except ED bedside ultrasound) are read by the radiologist, see reports below, unless otherwise noted in MDM or here. No orders to display     LABS: All lab results were reviewed by myself, and all abnormals are listed below. Labs Reviewed - No data to display  CONSULTS:  None  FINAL IMPRESSION    No diagnosis found.         PASTMEDICAL HISTORY     Past Medical History:   Diagnosis Date    GERD (gastroesophageal reflux disease)     Intractable nausea and vomiting 10/7/2021    Irritable bowel syndrome without diarrhea 2016    Moderate persistent asthma without complication     Moderate persistent asthma without complication     [facility]     2013, 14     SURGICAL HISTORY       Past Surgical History:   Procedure Laterality Date     SECTION          TUBAL LIGATION          UPPER GASTROINTESTINAL ENDOSCOPY N/A 10/9/2021    EGD BIOPSY performed by Dami Rubin MD at [facility]       Previous Medications    ACETAMINOPHEN (TYLENOL) 500 MG TABLET    TAKE ONE TABLET BY MOUTH FOUR TIMES A DAY AS NEEDED FOR PAIN    ALBUTEROL SULFATE HFA
to intermed, he/she agrees to accept the patient for further evaluation and treatment. Care was provided during an unprecedented national emergency due to the novel coronavirus, Covid-19. CONSULTS:  IP CONSULT TO HOSPITALIST  IP CONSULT TO GI    PROCEDURES:  Procedures        FINAL IMPRESSION      1. Intractable nausea and vomiting    2. Generalized abdominal pain          DISPOSITION/PLAN   DISPOSITION Admitted 05/08/2023 02:40:07 PM      PATIENTREFERRED TO:   No follow-up provider specified.     DISCHARGE MEDICATIONS:     New Prescriptions    No medications on file           (Please note that portions of this note were completed with a voice recognition program.  Efforts were made to edit thedictations but occasionally words are mis-transcribed.)    KARENA Webb PA-C  05/08/23 4103

## 2023-05-08 NOTE — H&P
limited due to combination of collapsed bowel loops on background of lack of GI tract contrast and paucity of intraperitoneal fat. XR CHEST PORTABLE    Result Date: 5/8/2023  No acute cardiopulmonary process. Assessment :      Hospital Problems             Last Modified POA    * (Principal) Cholangitis 5/8/2023 Yes    Mental retardation 5/8/2023 Yes    Overview Signed 6/10/2019  8:50 AM by Jaspreet Lutz [facility]     Replacing deprecated diagnoses         History of seizure disorder 7/1/7849 Yes    Cyclical vomiting 0/0/5244 Yes       Plan:     Patient status inpatient in the  Med/Surge    Cholangitis with cyclic vomiting  Antiemetics  Cipro Flagyl pending GI evaluation  Fluids  Pain meds  PPI  Stool for C. difficile and gastrointestinal panel  Acute hepatitis panel  Clear liquids    History of seizure disorder  Transition to p.o. Keppra IV until tolerating p.o. Seizure precautions     DVT prophylaxis    Consultations:   IP CONSULT TO HOSPITALIST  IP CONSULT TO GI    Patient is admitted as inpatient status because of co-morbidities listed above, severity of signs and symptoms as outlined, requirement for current medical therapies and most importantly because of direct risk to patient if care not provided in a hospital setting. Expected length of stay > 48 hours.     PRESTON Montesinos - CNP  5/8/2023  5:44 PM    Copy sent to Dr. Thea Silva MD

## 2023-05-09 PROBLEM — F12.20 TETRAHYDROCANNABINOL (THC) DEPENDENCE (HCC): Status: ACTIVE | Noted: 2023-05-09

## 2023-05-09 PROBLEM — F14.10 COCAINE ABUSE (HCC): Status: ACTIVE | Noted: 2023-05-09

## 2023-05-09 PROBLEM — R10.84 GENERALIZED ABDOMINAL PAIN: Status: ACTIVE | Noted: 2023-05-09

## 2023-05-09 LAB
ALBUMIN SERPL-MCNC: 4.5 G/DL (ref 3.5–5.2)
ALP SERPL-CCNC: 43 U/L (ref 35–104)
ALT SERPL-CCNC: 15 U/L (ref 5–33)
ANION GAP SERPL CALCULATED.3IONS-SCNC: 12 MMOL/L (ref 9–17)
AST SERPL-CCNC: 17 U/L
BILIRUB SERPL-MCNC: 0.5 MG/DL (ref 0.3–1.2)
BUN SERPL-MCNC: 9 MG/DL (ref 6–20)
BUN/CREAT BLD: 15 (ref 9–20)
CALCIUM SERPL-MCNC: 9.2 MG/DL (ref 8.6–10.4)
CHLORIDE SERPL-SCNC: 102 MMOL/L (ref 98–107)
CO2 SERPL-SCNC: 22 MMOL/L (ref 20–31)
CREAT SERPL-MCNC: 0.62 MG/DL (ref 0.5–0.9)
EKG ATRIAL RATE: 48 BPM
EKG P AXIS: 67 DEGREES
EKG P-R INTERVAL: 136 MS
EKG Q-T INTERVAL: 494 MS
EKG QRS DURATION: 92 MS
EKG QTC CALCULATION (BAZETT): 441 MS
EKG R AXIS: 68 DEGREES
EKG T AXIS: 70 DEGREES
EKG VENTRICULAR RATE: 48 BPM
GFR SERPL CREATININE-BSD FRML MDRD: >60 ML/MIN/1.73M2
GLUCOSE SERPL-MCNC: 110 MG/DL (ref 70–99)
HCT VFR BLD AUTO: 39 % (ref 36.3–47.1)
HGB BLD-MCNC: 13.7 G/DL (ref 11.9–15.1)
MCH RBC QN AUTO: 30.8 PG (ref 25.2–33.5)
MCHC RBC AUTO-ENTMCNC: 35.1 G/DL (ref 28.4–34.8)
MCV RBC AUTO: 87.6 FL (ref 82.6–102.9)
PDW BLD-RTO: 12.1 % (ref 11.8–14.4)
PLATELET # BLD AUTO: 373 K/UL (ref 138–453)
PMV BLD AUTO: 9.3 FL (ref 8.1–13.5)
POTASSIUM SERPL-SCNC: 3.8 MMOL/L (ref 3.7–5.3)
PROT SERPL-MCNC: 7 G/DL (ref 6.4–8.3)
RBC # BLD: 4.45 M/UL (ref 3.95–5.11)
SODIUM SERPL-SCNC: 136 MMOL/L (ref 135–144)
WBC # BLD AUTO: 10.7 K/UL (ref 3.5–11.3)

## 2023-05-09 PROCEDURE — 80053 COMPREHEN METABOLIC PANEL: CPT

## 2023-05-09 PROCEDURE — 99232 SBSQ HOSP IP/OBS MODERATE 35: CPT | Performed by: FAMILY MEDICINE

## 2023-05-09 PROCEDURE — 6360000002 HC RX W HCPCS: Performed by: FAMILY MEDICINE

## 2023-05-09 PROCEDURE — APPNB30 APP NON BILLABLE TIME 0-30 MINS: Performed by: NURSE PRACTITIONER

## 2023-05-09 PROCEDURE — 36415 COLL VENOUS BLD VENIPUNCTURE: CPT

## 2023-05-09 PROCEDURE — 85027 COMPLETE CBC AUTOMATED: CPT

## 2023-05-09 PROCEDURE — 1200000000 HC SEMI PRIVATE

## 2023-05-09 PROCEDURE — 6360000002 HC RX W HCPCS: Performed by: NURSE PRACTITIONER

## 2023-05-09 PROCEDURE — A4216 STERILE WATER/SALINE, 10 ML: HCPCS | Performed by: NURSE PRACTITIONER

## 2023-05-09 PROCEDURE — 2580000003 HC RX 258: Performed by: NURSE PRACTITIONER

## 2023-05-09 PROCEDURE — 2500000003 HC RX 250 WO HCPCS: Performed by: NURSE PRACTITIONER

## 2023-05-09 RX ORDER — METOCLOPRAMIDE HYDROCHLORIDE 5 MG/ML
10 INJECTION INTRAMUSCULAR; INTRAVENOUS EVERY 6 HOURS
Status: DISCONTINUED | OUTPATIENT
Start: 2023-05-09 | End: 2023-05-11 | Stop reason: HOSPADM

## 2023-05-09 RX ORDER — KETOROLAC TROMETHAMINE 15 MG/ML
15 INJECTION, SOLUTION INTRAMUSCULAR; INTRAVENOUS EVERY 6 HOURS PRN
Status: COMPLETED | OUTPATIENT
Start: 2023-05-09 | End: 2023-05-10

## 2023-05-09 RX ADMIN — SODIUM CHLORIDE, PRESERVATIVE FREE 20 MG: 5 INJECTION INTRAVENOUS at 20:47

## 2023-05-09 RX ADMIN — ONDANSETRON 4 MG: 2 INJECTION INTRAMUSCULAR; INTRAVENOUS at 17:41

## 2023-05-09 RX ADMIN — LEVETIRACETAM 750 MG: 100 INJECTION, SOLUTION INTRAVENOUS at 09:36

## 2023-05-09 RX ADMIN — ONDANSETRON 4 MG: 2 INJECTION INTRAMUSCULAR; INTRAVENOUS at 00:16

## 2023-05-09 RX ADMIN — MORPHINE SULFATE 2 MG: 2 INJECTION, SOLUTION INTRAMUSCULAR; INTRAVENOUS at 05:56

## 2023-05-09 RX ADMIN — SODIUM CHLORIDE, PRESERVATIVE FREE 20 MG: 5 INJECTION INTRAVENOUS at 09:14

## 2023-05-09 RX ADMIN — LEVETIRACETAM 750 MG: 100 INJECTION, SOLUTION INTRAVENOUS at 20:51

## 2023-05-09 RX ADMIN — ONDANSETRON 4 MG: 2 INJECTION INTRAMUSCULAR; INTRAVENOUS at 09:14

## 2023-05-09 RX ADMIN — METOCLOPRAMIDE HYDROCHLORIDE 10 MG: 5 INJECTION INTRAMUSCULAR; INTRAVENOUS at 13:33

## 2023-05-09 RX ADMIN — METRONIDAZOLE 500 MG: 500 INJECTION, SOLUTION INTRAVENOUS at 07:04

## 2023-05-09 RX ADMIN — SODIUM CHLORIDE: 9 INJECTION, SOLUTION INTRAVENOUS at 13:34

## 2023-05-09 RX ADMIN — CIPROFLOXACIN 400 MG: 400 INJECTION, SOLUTION INTRAVENOUS at 05:57

## 2023-05-09 RX ADMIN — METOCLOPRAMIDE HYDROCHLORIDE 10 MG: 5 INJECTION INTRAMUSCULAR; INTRAVENOUS at 20:47

## 2023-05-09 RX ADMIN — SODIUM CHLORIDE, PRESERVATIVE FREE 10 ML: 5 INJECTION INTRAVENOUS at 09:06

## 2023-05-09 RX ADMIN — KETOROLAC TROMETHAMINE 15 MG: 15 INJECTION, SOLUTION INTRAMUSCULAR; INTRAVENOUS at 18:32

## 2023-05-09 ASSESSMENT — PAIN SCALES - GENERAL: PAINLEVEL_OUTOF10: 10

## 2023-05-09 ASSESSMENT — PAIN DESCRIPTION - ORIENTATION: ORIENTATION: UPPER;LOWER;MID

## 2023-05-09 ASSESSMENT — PAIN DESCRIPTION - LOCATION: LOCATION: ABDOMEN

## 2023-05-09 ASSESSMENT — PAIN DESCRIPTION - DESCRIPTORS: DESCRIPTORS: CRAMPING

## 2023-05-09 NOTE — PLAN OF CARE
Problem: Discharge Planning  Goal: Discharge to home or other facility with appropriate resources  5/9/2023 1056 by Melo Echavarria RN  Outcome: Progressing  Flowsheets (Taken 5/9/2023 0905)  Discharge to home or other facility with appropriate resources:   Identify barriers to discharge with patient and caregiver   Arrange for needed discharge resources and transportation as appropriate   Identify discharge learning needs (meds, wound care, etc)   Refer to discharge planning if patient needs post-hospital services based on physician order or complex needs related to functional status, cognitive ability or social support system. Problem: Pain  Goal: Verbalizes/displays adequate comfort level or baseline comfort level  5/9/2023 1056 by Melo Echavarria RN  Outcome: Progressing  Flowsheets (Taken 5/9/2023 1056)  Verbalizes/displays adequate comfort level or baseline comfort level:   Encourage patient to monitor pain and request assistance   Assess pain using appropriate pain scale   Administer analgesics based on type and severity of pain and evaluate response   Implement non-pharmacological measures as appropriate and evaluate response   Consider cultural and social influences on pain and pain management   Notify Licensed Independent Practitioner if interventions unsuccessful or patient reports new pain  Note: Pain level assessment complete.    Patient educated on pain scale and control interventions  PRN pain medication given per patient request  Patient instructed to call out with new onset of pain or unrelieved pain    Problem: Cardiovascular - Adult  Goal: Maintains optimal cardiac output and hemodynamic stability  Outcome: Progressing  Flowsheets (Taken 5/9/2023 0905)  Maintains optimal cardiac output and hemodynamic stability:   Monitor blood pressure and heart rate   Monitor urine output and notify Licensed Independent Practitioner for values outside of normal range   Assess for signs of decreased

## 2023-05-09 NOTE — PLAN OF CARE
Case d/w dr Dahlia Engel will plan for egd tomorrow, doubt cholangitis, will stop the antibiotics . [facility] Sofia Cano, APRN - CNP

## 2023-05-09 NOTE — RT PROTOCOL NOTE
RT Inhaler-Nebulizer Bronchodilator Protocol Note    There is a bronchodilator order in the chart from a provider indicating to follow the RT Bronchodilator Protocol and there is an Initiate RT Inhaler-Nebulizer Bronchodilator Protocol order as well (see protocol at bottom of note). CXR Findings:  XR CHEST PORTABLE    Result Date: 5/8/2023  No acute cardiopulmonary process. The findings from the last RT Protocol Assessment were as follows:   History Pulmonary Disease: Smoker 15 pack years or more  Respiratory Pattern: Regular pattern and RR 12-20 bpm  Breath Sounds: Slightly diminished and/or crackles  Cough: Strong, spontaneous, non-productive  Indication for Bronchodilator Therapy: Decreased or absent breath sounds, On home bronchodilators  Bronchodilator Assessment Score: 3    Aerosolized bronchodilator medication orders have been revised according to the RT Inhaler-Nebulizer Bronchodilator Protocol below. Respiratory Therapist to perform RT Therapy Protocol Assessment initially then follow the protocol. Repeat RT Therapy Protocol Assessment PRN for score 0-3 or on second treatment, BID, and PRN for scores above 3. No Indications - adjust the frequency to every 6 hours PRN wheezing or bronchospasm, if no treatments needed after 48 hours then discontinue using Per Protocol order mode. If indication present, adjust the RT bronchodilator orders based on the Bronchodilator Assessment Score as indicated below. Use Inhaler orders unless patient has one or more of the following: on home nebulizer, not able to hold breath for 10 seconds, is not alert and oriented, cannot activate and use MDI correctly, or respiratory rate 25 breaths per minute or more, then use the equivalent nebulizer order(s) with same Frequency and PRN reasons based on the score. If a patient is on this medication at home then do not decrease Frequency below that used at home.     0-3 - enter or revise RT bronchodilator order(s)

## 2023-05-09 NOTE — PLAN OF CARE
PRE CONSULT ROUNDING NOTE  HPI  41year old female with pmh of gerd IBS asthma seizures who presented to the ED for abdominal pain with nausea, bile emesis and diarrhea since may 2. Our service is consulted for cholangitis. Reports generalized constant abdominal pain with nausea and multiple episodes of bile emesis. C/o non bloody diarrhea \"every time I get up to the bathroom\". No fevers or chills. Pt saw her pcp who prescribed tylenol and zofran but the pt has not been able to keep the medications down. No recent travel antibiotic use or ill contacts. Ct abd shows     IMPRESSION:  1. Significant periportal edema has developed since the prior study. Suspect  hypervolemic state but other possibilities would include acute hepatitis or  cholangitis. Clinical correlation advised. 2. Redemonstration of bilateral innumerable renal cysts ranging from about 2  mm to 4 cm in size with symmetric early enhancing kidneys which appear mildly  enlarged. Most suggestive of autosomal dominant polycystic kidney disease. Similar finding on multiple priors. 3. No cholelithiasis. Small amount of pericholecystic fluid between  gallbladder and liver probably extension of the periportal edema. 4. No bowel obstruction. Bowel detail is limited due to combination of  collapsed bowel loops on background of lack of GI tract contrast and paucity  of intraperitoneal fat. Labs show no leucocytosis or anemia, acute hepatitis panel and lft's are normal lipase is normal.   She reports a \"few pound\" weight loss this week.  No c/o dysphagia melena hematemesis hematochezia rash      Endoscopy no colonoscopy 10/9/21 egd ([facility]) inflamed GE junction with polypoidal formation bx negative for malignancy    Family reports no hx of liver pancreatic stomach or colon cancer no uc/crohns  Social quit  smoking no etoh quit marijuana  /68   Pulse 50   Temp 98.2 °F (36.8 °C) (Oral)   Resp 18   Ht 5' 2\" (1.575 m)   Wt 110 lb (49.9 kg)

## 2023-05-09 NOTE — CARE COORDINATION
Case Management Assessment  Initial Evaluation    Date/Time of Evaluation: 5/9/2023 10:36 AM  Assessment Completed by: Jose Pena RN    If patient is discharged prior to next notation, then this note serves as note for discharge by case management. Patient Name: Kole Hernandez                   Date of Birth: [birth year]  Diagnosis: Cyclical vomiting [A00.08]  Generalized abdominal pain [R10.84]  Intractable nausea and vomiting [R11.2]                   Date / Time: 5/8/2023 10:06 AM    Patient Admission Status: Inpatient   Readmission Risk (Low < 19, Mod (19-27), High > 27): Readmission Risk Score: 6.8    Current PCP: Jenn Mayorga MD  PCP verified by CM? Yes    Chart Reviewed: Yes      History Provided by: Patient  Patient Orientation: Alert and Oriented, Person, Place, Situation, Self    Patient Cognition: Alert    Hospitalization in the last 30 days (Readmission):  Yes    If yes, Readmission Assessment in CM Navigator will be completed. Advance Directives:      Code Status: Full Code   Patient's Primary Decision Maker is: Legal Next of Kin      Discharge Planning:    Patient lives with: Family Members Type of Home: Apartment  Primary Care Giver: Self  Patient Support Systems include: Spouse/Significant Other   Current Financial resources: None  Current community resources: None  Current services prior to admission: None            Current DME:              Type of Home Care services:  None    ADLS  Prior functional level:    Current functional level: Independent in ADLs/IADLs    PT AM-PAC:   /24  OT AM-PAC:   /24    Family can provide assistance at DC: Yes  Would you like Case Management to discuss the discharge plan with any other family members/significant others, and if so, who?  Yes  Plans to Return to Present Housing: Yes  Other Identified Issues/Barriers to RETURNING to current housing: none  Potential Assistance needed at discharge: N/A            Potential DME:    Patient expects to

## 2023-05-09 NOTE — PLAN OF CARE
PT ACUTE  Treatment Session          Pt seen on [facility] nursing unit.                                                Frequency Comments: M-F    RECOMMENDATIONS FOR DISCHARGE:  Recommendation for Discharge: PT WI: Post acute therapy (03/17/20 1318)                                                                                                                 Admitting complaint: Subdural hemorrhage (CMS/HCC) [I62.00]  Left-sided weakness [R53.1]                                     Precautions  Neck Brace: No (03/17/20 1318)  Seizure Precautions: Yes (03/17/20 1318)  Other Precautions: fall risk (03/17/20 1318)    SUBJECTIVE: Patient's Personal Goal: return home  (03/17/20 1318)  Subjective: Pt. agreeable to session.  (03/17/20 1318)  Subjective/Objective Comments: okay to see pt.  (03/17/20 1318)    OBJECTIVE:  Basic Lines: Fontana catheter (03/17/20 1318)  Safety Measures: Alarms (03/17/20 1318)    RN reported Jaimes Fall Scale Score: 100    ASSESSMENT:    Pt overall mobility is partial moderate assist with gait using the ww due to overall deconditioning. Pt progressed in gait . Patient limited today by fatigue. Pt ambulated in [place] with the ww and partial moderate assist for balance. Pt will benefit from continued PT to work on transfers, gait and therapeutic exer. to allow for maximal mobility. Patient returned to chair post session, alarm set, and call light within reach.              EDUCATION:   On this date, the patient was educated on importance of therapy.    The response to education was: Needs reinforcement    PT Identified Barriers to Discharge: medical, level of assist     PLAN:   Continue skilled PT, including the following Treatment/Interventions: Functional transfer training (03/17/20 1318)   Frequency Comments: M-F (03/17/20 1318)               RECOMMENDATIONS FOR DISCHARGE:  Recommendation for Discharge: PT WI: Post acute therapy (03/17/20 1318)    PT/OT Mobility Equipment for Discharge: will continue to  Problem: Discharge Planning  Goal: Discharge to home or other facility with appropriate resources  Outcome: Progressing  Note: Patient will go home following this admit.       Problem: Pain  Goal: Verbalizes/displays adequate comfort level or baseline comfort level  Outcome: Progressing  Note: Patient has pain control on for abdominal pain plus getting antibiotic therapy to assist. monitor (03/17/20 1318)  PT/OT ADL Equipment for Discharge: tbd (03/17/20 1318)     Assistance needed when returning home:   Not discussed at this time due to discharge plan/needs not established.  Will continue to address as hospital stay progresses.       ICU Mobility Assesment (PERME):       Last 24 hours of Functional Data  Bed Mobility        Transfers  Transfers  Sit to Stand: Minimal Assist (Min) (03/17/20 1318)  Stand to Sit: Minimal Assist (Min) (03/17/20 1318)  Stand Pivot Transfers: Moderate Assist (Mod) (03/17/20 1318)  Assistive Device/: 1 Person (03/17/20 1318)  Transfer Comments 1: minimal assist and cues  (03/17/20 1318)      Gait  Gait  Gait Assistance: Partial/Moderate Assistance (03/17/20 1318)  Assistive Device/: 2-wheeled walker (03/17/20 1318)  Ambulation Distance (Feet): 80 Feet (03/17/20 1318)  Pattern: Decreased stance time L (03/17/20 1318)  Ambulation Surface: Tile (03/17/20 1318)  Gait Comments 1: moderate to minimal assist for balance  (03/17/20 1318)  Second Trial: Yes (03/17/20 1318), Gait - Second Trial  Gait Assistance: Partial/Moderate Assistance (03/17/20 1318)  Assistive Device/: 2-wheeled walker (03/17/20 1318)  Ambulation Distance (Feet): 80 Feet (03/17/20 1318)  Pattern: Decreased stance time L (03/17/20 1318)  Ambulation Surface: Tile (03/17/20 1318)  Gait Comments 2: as above  (03/17/20 1318)    Stairs          Neuromuscular Re-education       Balance  Balance  Sitting - Static: Supervision (Supv) (03/17/20 1318)  Sitting - Dynamic: Supervision (Supv) (03/17/20 1318)  Balance Comments #1: cues for sequence  (03/17/20 1318)    Wheelchair Mobility       Patient's Personal Goal: return home  (03/17/20 1318)    Therapy Goals:    Goals  Short Term Goals to Be Reviewed On: 03/23/20 (03/17/20 1318)  Short Term Goals = Discharge Goals: No (03/17/20 1318)  Goal Agreement: Patient agrees with goals and treatment plan (03/17/20 1318)  Bed Mobility  Short Term Goal: supine <> sit with supervision (03/17/20 1318)  Bed Mobility Discharge Goal: supine <> sit with modified independence (03/17/20 1318)  Transfer Short Term Goal: sit <> stand and stand pivot transfer with min assist (03/17/20 1318)  Transfer Discharge Goal: sit <> stand with modified independence (03/17/20 1318)  Ambulation Short Term Goal: 100ft with min assist of 1 with 2ww (03/17/20 1318)  Ambulation Discharge Goal: 150ft with independence (03/17/20 1318)  Stairs Short Term Goal: 4 stairs with 2 rail with min assist of 1 (03/17/20 1318)  Stairs Discharge Goal: 12 stairs with 1 rail with modified independence (03/17/20 1318)        PT Time Spent: 45 minutes (03/17/20 1318)    See PT flowsheet for full details regarding the PT therapy provided.

## 2023-05-10 ENCOUNTER — APPOINTMENT (OUTPATIENT)
Dept: MRI IMAGING | Age: 43
DRG: 392 | End: 2023-05-10
Payer: COMMERCIAL

## 2023-05-10 ENCOUNTER — ANESTHESIA (OUTPATIENT)
Dept: OPERATING ROOM | Age: 43
DRG: 392 | End: 2023-05-10
Payer: COMMERCIAL

## 2023-05-10 ENCOUNTER — ANESTHESIA EVENT (OUTPATIENT)
Dept: OPERATING ROOM | Age: 43
DRG: 392 | End: 2023-05-10
Payer: COMMERCIAL

## 2023-05-10 LAB
ALBUMIN SERPL-MCNC: 4.3 G/DL (ref 3.5–5.2)
ALP SERPL-CCNC: 41 U/L (ref 35–104)
ALT SERPL-CCNC: 17 U/L (ref 5–33)
ANION GAP SERPL CALCULATED.3IONS-SCNC: 12 MMOL/L (ref 9–17)
AST SERPL-CCNC: 19 U/L
BILIRUB SERPL-MCNC: 0.5 MG/DL (ref 0.3–1.2)
BUN SERPL-MCNC: 11 MG/DL (ref 6–20)
BUN/CREAT BLD: 16 (ref 9–20)
CALCIUM SERPL-MCNC: 8.8 MG/DL (ref 8.6–10.4)
CHLORIDE SERPL-SCNC: 103 MMOL/L (ref 98–107)
CO2 SERPL-SCNC: 21 MMOL/L (ref 20–31)
CREAT SERPL-MCNC: 0.68 MG/DL (ref 0.5–0.9)
GFR SERPL CREATININE-BSD FRML MDRD: >60 ML/MIN/1.73M2
GLUCOSE SERPL-MCNC: 87 MG/DL (ref 70–99)
HCT VFR BLD AUTO: 38.1 % (ref 36.3–47.1)
HGB BLD-MCNC: 13.8 G/DL (ref 11.9–15.1)
MCH RBC QN AUTO: 31.3 PG (ref 25.2–33.5)
MCHC RBC AUTO-ENTMCNC: 36.2 G/DL (ref 28.4–34.8)
MCV RBC AUTO: 86.4 FL (ref 82.6–102.9)
NRBC AUTOMATED: 0 PER 100 WBC
PDW BLD-RTO: 12.2 % (ref 11.8–14.4)
PLATELET # BLD AUTO: 345 K/UL (ref 138–453)
PMV BLD AUTO: 9.5 FL (ref 8.1–13.5)
POTASSIUM SERPL-SCNC: 3.6 MMOL/L (ref 3.7–5.3)
PROT SERPL-MCNC: 6.6 G/DL (ref 6.4–8.3)
RBC # BLD: 4.41 M/UL (ref 3.95–5.11)
SODIUM SERPL-SCNC: 136 MMOL/L (ref 135–144)
WBC # BLD AUTO: 7.7 K/UL (ref 3.5–11.3)

## 2023-05-10 PROCEDURE — 99232 SBSQ HOSP IP/OBS MODERATE 35: CPT | Performed by: FAMILY MEDICINE

## 2023-05-10 PROCEDURE — 7100000010 HC PHASE II RECOVERY - FIRST 15 MIN: Performed by: INTERNAL MEDICINE

## 2023-05-10 PROCEDURE — 6360000002 HC RX W HCPCS: Performed by: INTERNAL MEDICINE

## 2023-05-10 PROCEDURE — 6360000002 HC RX W HCPCS: Performed by: FAMILY MEDICINE

## 2023-05-10 PROCEDURE — 3700000001 HC ADD 15 MINUTES (ANESTHESIA): Performed by: INTERNAL MEDICINE

## 2023-05-10 PROCEDURE — 85027 COMPLETE CBC AUTOMATED: CPT

## 2023-05-10 PROCEDURE — 74183 MRI ABD W/O CNTR FLWD CNTR: CPT

## 2023-05-10 PROCEDURE — 3700000000 HC ANESTHESIA ATTENDED CARE: Performed by: INTERNAL MEDICINE

## 2023-05-10 PROCEDURE — 2500000003 HC RX 250 WO HCPCS: Performed by: NURSE ANESTHETIST, CERTIFIED REGISTERED

## 2023-05-10 PROCEDURE — A9579 GAD-BASE MR CONTRAST NOS,1ML: HCPCS | Performed by: NURSE PRACTITIONER

## 2023-05-10 PROCEDURE — 2709999900 HC NON-CHARGEABLE SUPPLY: Performed by: INTERNAL MEDICINE

## 2023-05-10 PROCEDURE — 3609012400 HC EGD TRANSORAL BIOPSY SINGLE/MULTIPLE: Performed by: INTERNAL MEDICINE

## 2023-05-10 PROCEDURE — 1200000000 HC SEMI PRIVATE

## 2023-05-10 PROCEDURE — 6360000004 HC RX CONTRAST MEDICATION: Performed by: NURSE PRACTITIONER

## 2023-05-10 PROCEDURE — A4216 STERILE WATER/SALINE, 10 ML: HCPCS | Performed by: INTERNAL MEDICINE

## 2023-05-10 PROCEDURE — 2580000003 HC RX 258: Performed by: INTERNAL MEDICINE

## 2023-05-10 PROCEDURE — 2500000003 HC RX 250 WO HCPCS: Performed by: INTERNAL MEDICINE

## 2023-05-10 PROCEDURE — 0DB98ZX EXCISION OF DUODENUM, VIA NATURAL OR ARTIFICIAL OPENING ENDOSCOPIC, DIAGNOSTIC: ICD-10-PCS | Performed by: INTERNAL MEDICINE

## 2023-05-10 PROCEDURE — 36415 COLL VENOUS BLD VENIPUNCTURE: CPT

## 2023-05-10 PROCEDURE — 7100000011 HC PHASE II RECOVERY - ADDTL 15 MIN: Performed by: INTERNAL MEDICINE

## 2023-05-10 PROCEDURE — 88305 TISSUE EXAM BY PATHOLOGIST: CPT

## 2023-05-10 PROCEDURE — 0DB68ZX EXCISION OF STOMACH, VIA NATURAL OR ARTIFICIAL OPENING ENDOSCOPIC, DIAGNOSTIC: ICD-10-PCS | Performed by: INTERNAL MEDICINE

## 2023-05-10 PROCEDURE — 6360000002 HC RX W HCPCS: Performed by: NURSE ANESTHETIST, CERTIFIED REGISTERED

## 2023-05-10 PROCEDURE — 2580000003 HC RX 258: Performed by: NURSE PRACTITIONER

## 2023-05-10 PROCEDURE — 80053 COMPREHEN METABOLIC PANEL: CPT

## 2023-05-10 RX ORDER — ONDANSETRON 2 MG/ML
4 INJECTION INTRAMUSCULAR; INTRAVENOUS
Status: DISCONTINUED | OUTPATIENT
Start: 2023-05-10 | End: 2023-05-10 | Stop reason: HOSPADM

## 2023-05-10 RX ORDER — FENTANYL CITRATE 50 UG/ML
25 INJECTION, SOLUTION INTRAMUSCULAR; INTRAVENOUS EVERY 5 MIN PRN
Status: DISCONTINUED | OUTPATIENT
Start: 2023-05-10 | End: 2023-05-10 | Stop reason: HOSPADM

## 2023-05-10 RX ORDER — SODIUM CHLORIDE 0.9 % (FLUSH) 0.9 %
5-40 SYRINGE (ML) INJECTION EVERY 12 HOURS SCHEDULED
Status: DISCONTINUED | OUTPATIENT
Start: 2023-05-10 | End: 2023-05-10 | Stop reason: HOSPADM

## 2023-05-10 RX ORDER — LIDOCAINE HYDROCHLORIDE 10 MG/ML
INJECTION, SOLUTION EPIDURAL; INFILTRATION; INTRACAUDAL; PERINEURAL PRN
Status: DISCONTINUED | OUTPATIENT
Start: 2023-05-10 | End: 2023-05-10 | Stop reason: SDUPTHER

## 2023-05-10 RX ORDER — SODIUM CHLORIDE 0.9 % (FLUSH) 0.9 %
10 SYRINGE (ML) INJECTION ONCE
Status: DISCONTINUED | OUTPATIENT
Start: 2023-05-10 | End: 2023-05-11 | Stop reason: HOSPADM

## 2023-05-10 RX ORDER — HYDROMORPHONE HYDROCHLORIDE 1 MG/ML
0.5 INJECTION, SOLUTION INTRAMUSCULAR; INTRAVENOUS; SUBCUTANEOUS EVERY 5 MIN PRN
Status: DISCONTINUED | OUTPATIENT
Start: 2023-05-10 | End: 2023-05-10 | Stop reason: HOSPADM

## 2023-05-10 RX ORDER — PROPOFOL 10 MG/ML
INJECTION, EMULSION INTRAVENOUS PRN
Status: DISCONTINUED | OUTPATIENT
Start: 2023-05-10 | End: 2023-05-10 | Stop reason: SDUPTHER

## 2023-05-10 RX ORDER — SODIUM CHLORIDE 9 MG/ML
INJECTION, SOLUTION INTRAVENOUS PRN
Status: DISCONTINUED | OUTPATIENT
Start: 2023-05-10 | End: 2023-05-10 | Stop reason: HOSPADM

## 2023-05-10 RX ORDER — 0.9 % SODIUM CHLORIDE 0.9 %
50 INTRAVENOUS SOLUTION INTRAVENOUS ONCE
Status: COMPLETED | OUTPATIENT
Start: 2023-05-10 | End: 2023-05-10

## 2023-05-10 RX ORDER — SODIUM CHLORIDE 0.9 % (FLUSH) 0.9 %
5-40 SYRINGE (ML) INJECTION PRN
Status: DISCONTINUED | OUTPATIENT
Start: 2023-05-10 | End: 2023-05-10 | Stop reason: HOSPADM

## 2023-05-10 RX ADMIN — SODIUM CHLORIDE 50 ML: 9 INJECTION, SOLUTION INTRAVENOUS at 13:47

## 2023-05-10 RX ADMIN — METOCLOPRAMIDE HYDROCHLORIDE 10 MG: 5 INJECTION INTRAMUSCULAR; INTRAVENOUS at 01:25

## 2023-05-10 RX ADMIN — PROPOFOL 50 MG: 10 INJECTION, EMULSION INTRAVENOUS at 07:47

## 2023-05-10 RX ADMIN — LIDOCAINE HYDROCHLORIDE 100 MG: 10 INJECTION, SOLUTION EPIDURAL; INFILTRATION; INTRACAUDAL; PERINEURAL at 07:43

## 2023-05-10 RX ADMIN — LEVETIRACETAM 750 MG: 100 INJECTION, SOLUTION INTRAVENOUS at 20:50

## 2023-05-10 RX ADMIN — SODIUM CHLORIDE, PRESERVATIVE FREE 10 ML: 5 INJECTION INTRAVENOUS at 09:51

## 2023-05-10 RX ADMIN — SODIUM CHLORIDE, PRESERVATIVE FREE 20 MG: 5 INJECTION INTRAVENOUS at 09:49

## 2023-05-10 RX ADMIN — KETOROLAC TROMETHAMINE 15 MG: 15 INJECTION, SOLUTION INTRAMUSCULAR; INTRAVENOUS at 20:54

## 2023-05-10 RX ADMIN — PROPOFOL 50 MG: 10 INJECTION, EMULSION INTRAVENOUS at 07:45

## 2023-05-10 RX ADMIN — PROPOFOL 50 MG: 10 INJECTION, EMULSION INTRAVENOUS at 07:43

## 2023-05-10 RX ADMIN — SODIUM CHLORIDE, PRESERVATIVE FREE 20 MG: 5 INJECTION INTRAVENOUS at 20:45

## 2023-05-10 RX ADMIN — KETOROLAC TROMETHAMINE 15 MG: 15 INJECTION, SOLUTION INTRAMUSCULAR; INTRAVENOUS at 01:28

## 2023-05-10 RX ADMIN — GADOTERIDOL 9 ML: 279.3 INJECTION, SOLUTION INTRAVENOUS at 13:46

## 2023-05-10 RX ADMIN — LEVETIRACETAM 750 MG: 100 INJECTION, SOLUTION INTRAVENOUS at 09:49

## 2023-05-10 RX ADMIN — METOCLOPRAMIDE HYDROCHLORIDE 10 MG: 5 INJECTION INTRAMUSCULAR; INTRAVENOUS at 15:37

## 2023-05-10 RX ADMIN — SODIUM CHLORIDE: 9 INJECTION, SOLUTION INTRAVENOUS at 01:29

## 2023-05-10 RX ADMIN — METOCLOPRAMIDE HYDROCHLORIDE 10 MG: 5 INJECTION INTRAMUSCULAR; INTRAVENOUS at 20:49

## 2023-05-10 ASSESSMENT — PAIN - FUNCTIONAL ASSESSMENT: PAIN_FUNCTIONAL_ASSESSMENT: PREVENTS OR INTERFERES SOME ACTIVE ACTIVITIES AND ADLS

## 2023-05-10 ASSESSMENT — PAIN DESCRIPTION - DESCRIPTORS: DESCRIPTORS: SHARP

## 2023-05-10 ASSESSMENT — PAIN DESCRIPTION - FREQUENCY: FREQUENCY: INTERMITTENT

## 2023-05-10 ASSESSMENT — PAIN DESCRIPTION - ORIENTATION: ORIENTATION: LEFT;RIGHT

## 2023-05-10 ASSESSMENT — PAIN DESCRIPTION - LOCATION: LOCATION: ABDOMEN

## 2023-05-10 ASSESSMENT — PAIN DESCRIPTION - ONSET: ONSET: ON-GOING

## 2023-05-10 ASSESSMENT — PAIN DESCRIPTION - PAIN TYPE: TYPE: ACUTE PAIN

## 2023-05-10 ASSESSMENT — PAIN SCALES - GENERAL
PAINLEVEL_OUTOF10: 4
PAINLEVEL_OUTOF10: 10

## 2023-05-10 NOTE — ANESTHESIA POSTPROCEDURE EVALUATION
Department of Anesthesiology  Postprocedure Note    Patient: Alex Saenz  MRN: [id number]  YOB: 1980  Date of evaluation: 5/10/2023      Procedure Summary     Date: 05/10/23 Room / Location: [address] - INPATIENT    Anesthesia Start: 0394 Anesthesia Stop: 0802    Procedure: EGD BIOPSY Diagnosis:       Abdominal pain, unspecified abdominal location      (ABDOMINAL PAIN)    Surgeons: Joann Funez MD Responsible Provider: Raj Naik MD    Anesthesia Type: MAC ASA Status: 3          Anesthesia Type: MAC    London Phase I: London Score: 8    London Phase II: London Score: 5      Anesthesia Post Evaluation    Patient location during evaluation: PACU  Patient participation: complete - patient participated  Level of consciousness: awake  Airway patency: patent  Nausea & Vomiting: no nausea  Complications: no  Cardiovascular status: blood pressure returned to baseline  Respiratory status: acceptable  Hydration status: euvolemic  Comments: Multimodal analgesia pain management as indicated by procedure  Multimodal analgesia pain management approach

## 2023-05-10 NOTE — OP NOTE
noted, patient will be ready for d/c when criteria is met . Findings:    Retropharyngeal area was grossly normal appearing    Esophagus: normal    Stomach: Moderate gastritis biopsies were taken      Duodenum:   Random small bowel biopsies were taken  Prominent papilla      The scope was removed and the patient tolerated the procedure well.      Recommendations/Plan:   F/U Biopsies  MRI/MRCP      Electronically signed by Carlos Buckley MD  on 5/10/2023 at 8:02 AM

## 2023-05-10 NOTE — CONSULTS
atraumatic  Eyes: pupils equal, round, and reactive to light, extraocular eye movements intact, conjunctivae normal  ENT: hearing grossly normal bilaterally  Neck: neck supple and non tender without mass, no thyromegaly or thyroid nodules, no cervical lymphadenopathy   Pulmonary/Chest: clear to auscultation bilaterally- no wheezes, rales or rhonchi, normal air movement, no respiratory distress  Cardiovascular: normal rate, regular rhythm, normal S1 and S2, no murmurs, rubs, clicks or gallops, distal pulses intact, no carotid bruits  Abdomen: soft, non-tender, non-distended, normal bowel sounds, no masses or organomegaly  Extremities: no cyanosis, clubbing or edema  Musculoskeletal: normal range of motion, no joint swelling, deformity or tenderness  Neurologic: no cranial nerve deficit and muscle strength normal    Data Review:    Recent Labs     05/08/23 1039 05/09/23  0624   WBC 6.7 10.7   HGB 14.9 13.7   HCT 41.2 39.0   MCV 86.2 87.6    373     Recent Labs     05/08/23 1039 05/09/23  0624    136   K 3.6* 3.8    102   CO2 23 22   BUN 11 9   CREATININE 0.72 0.62     Recent Labs     05/08/23  1039 05/09/23  0624   AST 19 17   ALT 14 15   BILITOT 0.4 0.5   ALKPHOS 42 43     Recent Labs     05/08/23  1039   LIPASE 17     No results for input(s): PROTIME, INR in the last 72 hours. No results for input(s): PTT in the last 72 hours. No results for input(s): OCCULTBLD in the last 72 hours. CEA:  No results found for: CEA  Ca 125:  No results found for:   Ca 19-9:  No results found for:   Ca 15-3:  No results found for:   AFP:  No components found for: AFAFP  Beta HCG:  No components found for: BHCG  Neuron Specific Enolase:  No results found for: NSE  Imaging Studies:                           All appropriate imaging studies and reports reviewed:  Yes                 Assessment:     Principal Problem:    Intractable nausea and vomiting  Active Problems:    Mental retardation    History

## 2023-05-10 NOTE — ANESTHESIA PRE PROCEDURE
Department of Anesthesiology  Preprocedure Note       Name:  Herlinda Engel   Age:  41 y.o.  :  1980                                          MRN:  [id number]         Date:  5/10/2023      Surgeon: Stanton Arnold):  Carolyn Lester MD    Procedure: Procedure(s):  EGD ESOPHAGOGASTRODUODENOSCOPY    Medications prior to admission:   Prior to Admission medications    Medication Sig Start Date End Date Taking?  Authorizing Provider   albuterol sulfate HFA (PROVENTIL;VENTOLIN;PROAIR) 108 (90 Base) MCG/ACT inhaler INHALE TWO PUFFS BY MOUTH EVERY 6 HOURS AS NEEDED FOR WHEEZING 23   Hannah Jacobs MD   Nutritional Supplements (ENSURE ORIGINAL) LIQD Take 1 each by mouth daily 23   Hannah Jacobs MD   ondansetron (ZOFRAN) 4 MG tablet Take 1 tablet by mouth 3 times daily as needed for Nausea or Vomiting 23   Hannah Jacobs MD   pantoprazole (PROTONIX) 40 MG tablet TAKE ONE TABLET BY MOUTH EVERY MORNING BEFORE BREAKFAST 23   Hannah Jacobs MD   vitamin D3 (CHOLECALCIFEROL) 25 MCG (1000 UT) TABS tablet Take 1 tablet by mouth daily 23   Hannah Jacobs MD   Multiple Vitamin (THERA/BETA-CAROTENE) TABS Take 1 tablet by mouth daily 23   Hannah Jacobs MD   fluticasone (FLOVENT HFA) 220 MCG/ACT inhaler Inhale 2 puffs into the lungs 2 times daily 23  Hannah Jacobs MD   acetaminophen (TYLENOL) 500 MG tablet TAKE ONE TABLET BY MOUTH FOUR TIMES A DAY AS NEEDED FOR PAIN 23   Matt Self MD   metoclopramide (REGLAN) 5 MG tablet Take 1 tablet by mouth 3 times daily (with meals) 3/9/23 4/8/23  PRESTON Fitch NP   levETIRAcetam (KEPPRA) 750 MG tablet TAKE ONE TABLET BY MOUTH TWICE A DAY 23   Matt Self MD   cyclobenzaprine (FLEXERIL) 5 MG tablet TAKE ONE TABLET BY MOUTH ONCE NIGHTLY AS NEEDED FOR MUSCLE SPAMS 23   Matt Self MD   dicyclomine (BENTYL) 10 MG capsule Take 1 capsule by mouth 4 times daily (before meals and nightly) 23   Alessandro Lagos MD   KLOR-CON M20 20

## 2023-05-10 NOTE — PLAN OF CARE
Problem: Musculoskeletal - Adult  Goal: Return mobility to safest level of function  Outcome: Progressing     Problem: Cardiovascular - Adult  Goal: Maintains optimal cardiac output and hemodynamic stability  Outcome: Progressing

## 2023-05-10 NOTE — PLAN OF CARE
Problem: Discharge Planning  Goal: Discharge to home or other facility with appropriate resources  5/10/2023 1033 by Jalil Ricks RN  Outcome: Progressing  Flowsheets (Taken 5/10/2023 3766)  Discharge to home or other facility with appropriate resources: Identify barriers to discharge with patient and caregiver  5/10/2023 0345 by Brad Sow RN  Outcome: Progressing     Problem: Pain  Goal: Verbalizes/displays adequate comfort level or baseline comfort level  5/10/2023 1033 by Jalil Ricks RN  Outcome: Progressing  5/10/2023 0345 by Brad Sow RN  Outcome: Progressing     Problem: Cardiovascular - Adult  Goal: Maintains optimal cardiac output and hemodynamic stability  5/10/2023 1033 by Jalil Ricks RN  Outcome: Progressing  Flowsheets (Taken 5/10/2023 0949)  Maintains optimal cardiac output and hemodynamic stability: Monitor blood pressure and heart rate  5/10/2023 0345 by Brad Sow RN  Outcome: Progressing     Problem: Musculoskeletal - Adult  Goal: Return mobility to safest level of function  5/10/2023 1033 by Jalil Ricks RN  Outcome: Progressing  Flowsheets (Taken 5/10/2023 0949)  Return Mobility to Safest Level of Function: Assess patient stability and activity tolerance for standing, transferring and ambulating with or without assistive devices  5/10/2023 0345 by Brad Sow RN  Outcome: Progressing     Problem: Gastrointestinal - Adult  Goal: Minimal or absence of nausea and vomiting  5/10/2023 1033 by Jalil Ricks RN  Outcome: Progressing  Flowsheets (Taken 5/10/2023 0949)  Minimal or absence of nausea and vomiting: Administer IV fluids as ordered to ensure adequate hydration  5/10/2023 0345 by Brad Sow RN  Outcome: Progressing  Goal: Maintains adequate nutritional intake  5/10/2023 1033 by Jalil Ricks RN  Outcome: Progressing  Flowsheets (Taken 5/10/2023 0949)  Maintains adequate nutritional intake: Monitor percentage of each meal consumed  5/10/2023 0345

## 2023-05-11 VITALS
DIASTOLIC BLOOD PRESSURE: 83 MMHG | HEART RATE: 53 BPM | OXYGEN SATURATION: 98 % | TEMPERATURE: 97.9 F | HEIGHT: 62 IN | WEIGHT: 108 LBS | BODY MASS INDEX: 19.88 KG/M2 | RESPIRATION RATE: 16 BRPM | SYSTOLIC BLOOD PRESSURE: 125 MMHG

## 2023-05-11 DIAGNOSIS — R11.2 INTRACTABLE NAUSEA AND VOMITING: ICD-10-CM

## 2023-05-11 DIAGNOSIS — R19.7 DIARRHEA, UNSPECIFIED TYPE: ICD-10-CM

## 2023-05-11 LAB — SURGICAL PATHOLOGY REPORT: NORMAL

## 2023-05-11 PROCEDURE — 6360000002 HC RX W HCPCS: Performed by: INTERNAL MEDICINE

## 2023-05-11 PROCEDURE — APPSS30 APP SPLIT SHARED TIME 16-30 MINUTES: Performed by: NURSE PRACTITIONER

## 2023-05-11 PROCEDURE — 2500000003 HC RX 250 WO HCPCS: Performed by: INTERNAL MEDICINE

## 2023-05-11 PROCEDURE — 99239 HOSP IP/OBS DSCHRG MGMT >30: CPT | Performed by: FAMILY MEDICINE

## 2023-05-11 PROCEDURE — A4216 STERILE WATER/SALINE, 10 ML: HCPCS | Performed by: INTERNAL MEDICINE

## 2023-05-11 PROCEDURE — 2580000003 HC RX 258: Performed by: INTERNAL MEDICINE

## 2023-05-11 RX ORDER — METOCLOPRAMIDE 5 MG/1
5 TABLET ORAL
Qty: 90 TABLET | Refills: 0 | Status: SHIPPED | OUTPATIENT
Start: 2023-05-11 | End: 2023-06-10

## 2023-05-11 RX ADMIN — SODIUM CHLORIDE, PRESERVATIVE FREE 20 MG: 5 INJECTION INTRAVENOUS at 08:42

## 2023-05-11 RX ADMIN — METOCLOPRAMIDE HYDROCHLORIDE 10 MG: 5 INJECTION INTRAMUSCULAR; INTRAVENOUS at 01:11

## 2023-05-11 RX ADMIN — METOCLOPRAMIDE HYDROCHLORIDE 10 MG: 5 INJECTION INTRAMUSCULAR; INTRAVENOUS at 08:41

## 2023-05-11 RX ADMIN — SODIUM CHLORIDE: 9 INJECTION, SOLUTION INTRAVENOUS at 05:46

## 2023-05-11 RX ADMIN — LEVETIRACETAM 750 MG: 100 INJECTION, SOLUTION INTRAVENOUS at 08:41

## 2023-05-11 ASSESSMENT — PAIN SCALES - GENERAL: PAINLEVEL_OUTOF10: 0

## 2023-05-11 NOTE — PROGRESS NOTES
[facility]    Gastroenterology Daily Progress Note      Patient:   Yue Mancia   :    1980   Facility:   [facility]  Date:     2023  Consultant:   PRESTON Pate CNP, CNP      SUBJECTIVE  41 y.o. female admitted 8506 with Cyclical vomiting [S29.70]  Generalized abdominal pain [R10.84]  Intractable nausea and vomiting [R11.2] and seen for nausea and vomiting, periportal edema. The pt was seen and examined. She is s/p egd yesterday that is discussed below. No c/o abdominal pain or nausea. Today. Mri does not show choledocholithiasis.  .        OBJECTIVE  Scheduled Meds:   sodium chloride flush  10 mL IntraVENous Once    metoclopramide  10 mg IntraVENous Q6H    levETIRAcetam  750 mg IntraVENous Q12H    sodium chloride flush  5-40 mL IntraVENous 2 times per day    famotidine (PEPCID) injection  20 mg IntraVENous BID       Vital Signs:  /83   Pulse 53   Temp 97.9 °F (36.6 °C) (Oral)   Resp 16   Ht 5' 2\" (1.575 m)   Wt 108 lb (49 kg)   LMP 2023   SpO2 98%   BMI 19.75 kg/m²    Review of Systems - History obtained from chart review and the patient  General ROS: negative  Respiratory ROS: no cough, shortness of breath, or wheezing  Cardiovascular ROS: no chest pain or dyspnea on exertion  Gastrointestinal ROS: no abdominal pain, change in bowel habits, or black or bloody stools   Physical exam        General Appearance: alert and oriented to person, place and time, well-developed and well-nourished, in no acute distress  Skin: warm and dry, no rash or erythema  Head: normocephalic and atraumatic  Eyes: pupils equal, round, and reactive to light, extraocular eye movements intact, conjunctivae normal  ENT: hearing grossly normal bilaterally  Neck: neck supple and non tender without mass, no thyromegaly or thyroid nodules, no cervical lymphadenopathy   Pulmonary/Chest: clear to auscultation bilaterally- no wheezes, rales or rhonchi, normal air movement,
CLINICAL PHARMACY NOTE: MEDS TO BEDS    Total # of Prescriptions Filled: 1   The following medications were delivered to the patient:  Metoclopramide 5mg    Additional Documentation:
[facility]  PROGRESS NOTE    Room # 2103/2103-01   Name: Abiel Reynolds              Reason for visit: Routine    I visited the patient. Admit Date & Time: 5/8/2023 10:06 AM    Assessment:  Abiel Reynolds is a 41 y.o. female. Upon entering the room patient was sleeping. Intervention:   provided a ministry presence and brief prayer. Outcome:  Patient did not respond. Plan:  Chaplains will remain available to offer spiritual and emotional support as needed. Electronically signed by Agustin Staton.  Chaplain Chema, on 5/9/2023 at 2:44 PM.  Alisa
[facility]  Office: [address], DO, Rosena Habermann, DO, Seamus Chaudhary, DO, Acosta Lew, DO, Vani Jones MD, Marce Easton MD, Aubrey Avendano MD, Clint Hagen MD,  Jax Ritter MD, Zunilda Gonsalez MD, Berlin Martin DO, Daren An MD,  Sabine Rogers MD, Kiran Roa MD, Poncho Bolanos DO, Ervin Yanes MD, Santi Adkins MD, Akila Cantu DO, Alison Harmon MD, Deng Garcia MD, Anabell Oakes MD, Cheree Brittle, MD,  Pat Parmar DO, Jace Lennox, MD,  Kristan Manzanares, KATHY,  Sharyle Russell, CNP, Elder Stevens, CNP, Earnestine Ramos, CNP,  Abdi Pearson, [facility], Hari Hendricks, CNP, Rebekah Marquez, CNP, Cheli Agarwal, CNP, Xochitl Jacinto, CNP, Therese Sung, CNP, Heath Mejía PA-C, Juan Skill, CNS, Raul Fletcher, CNP, Kassie Michaud, CNP         [address]    Progress Note    5/10/2023    8:18 AM    Name:   Jeny Garcia  MRN:     [id number]     Acct:      [de-identified]   Room:   [facility] OR Pool/NONE  IP Day:  2  Admit Date:  5/8/2023 10:06 AM    PCP:   Ekaterina Carlos MD  Code Status:  Full Code    Subjective:     Improved  Patient seen and examined at bedside, no acute events overnight. She was seen after coming back from EGD, looks:, No acute pain. She is scheduled to have MRCP today. Patient vitals, labs and all providers notes were reviewed,from overnight shift and morning updates were noted and discussed with the nurse    Medications: Allergies:     Allergies   Allergen Reactions    Omeprazole      Pt had a seizure       Current Meds:   Scheduled Meds:    sodium chloride flush  5-40 mL IntraVENous 2 times per day    [MAR Hold] metoclopramide  10 mg IntraVENous Q6H    [MAR Hold] levETIRAcetam  750 mg IntraVENous Q12H    [MAR Hold] sodium chloride flush  5-40 mL IntraVENous 2 times per day    [MAR Hold] famotidine (PEPCID) injection  20 mg IntraVENous BID     Continuous
Patient arrived to unit via stretcher and was able to ambulate over to bed. Patient was given call light and 2 side rails up for safety.
Patient tolerated breakfast with no complications. No n/v noted.
Patient tolerated dinner diet. No emesis noted.
Pt discharged to home in stable condition with belongings  Discharge instructions given  \"Meds To Beds\" medication at bedside  Pt denies having any further questions at this time  Personal items given to patient at discharge  Patient/family state they have everything they were admitted with.
Report called to Eric.  Pt transported per cart to room per [facility]
Transitions of Care Pharmacy Service   Medication Review    The patient's list of current home medications has been reviewed. Patient admits to poor compliance with almost all of her meds. She states that she takes them occasionally or as needed, but they all make her vomit. Source(s) of information: patient/ Surescripts/EPIC    Based on information provided by the above source(s), no changes to the patient's home medication list were necessary. Please review the ACTION REQUESTED section of this note below for any discrepancies on current hospital orders. PROVIDER ACTION REQUESTED  Medications that need to be addressed by a physician/nurse practitioner:    Medication Action Requested        none         Please feel free to call me with any questions about this encounter. Thank you.     Kermit Julian [facility]   Transitions of Care Pharmacy Service  Phone:  [phone]  Fax: [phone]      Electronically signed by Kermit Julian [facility] on 5/9/2023 at 5:47 PM           Medications Prior to Admission: albuterol sulfate HFA (PROVENTIL;VENTOLIN;PROAIR) 108 (90 Base) MCG/ACT inhaler, INHALE TWO PUFFS BY MOUTH EVERY 6 HOURS AS NEEDED FOR WHEEZING  Nutritional Supplements (ENSURE ORIGINAL) LIQD, Take 1 each by mouth daily  ondansetron (ZOFRAN) 4 MG tablet, Take 1 tablet by mouth 3 times daily as needed for Nausea or Vomiting  pantoprazole (PROTONIX) 40 MG tablet, TAKE ONE TABLET BY MOUTH EVERY MORNING BEFORE BREAKFAST  vitamin D3 (CHOLECALCIFEROL) 25 MCG (1000 UT) TABS tablet, Take 1 tablet by mouth daily  Multiple Vitamin (THERA/BETA-CAROTENE) TABS, Take 1 tablet by mouth daily  fluticasone (FLOVENT HFA) 220 MCG/ACT inhaler, Inhale 2 puffs into the lungs 2 times daily  acetaminophen (TYLENOL) 500 MG tablet, TAKE ONE TABLET BY MOUTH FOUR TIMES A DAY AS NEEDED FOR PAIN  metoclopramide (REGLAN) 5 MG tablet, Take 1 tablet by mouth 3 times daily (with meals)  levETIRAcetam (KEPPRA) 750 MG tablet, TAKE ONE TABLET BY MOUTH
IntraVENous Q12H    metroNIDAZOLE  500 mg IntraVENous Q8H     Continuous Infusions:    sodium chloride 75 mL/hr at 23 1334    sodium chloride       PRN Meds: albuterol sulfate HFA, sodium chloride flush, sodium chloride, ondansetron **OR** ondansetron, acetaminophen **OR** acetaminophen, morphine    Data:     Vitals:  /71   Pulse 50   Temp 98.2 °F (36.8 °C) (Oral)   Resp 16   Ht 5' 2\" (1.575 m)   Wt 110 lb (49.9 kg)   LMP 2023   SpO2 97%   BMI 20.12 kg/m²   Temp (24hrs), Av.2 °F (36.8 °C), Min:98.1 °F (36.7 °C), Max:98.3 °F (36.8 °C)    No results for input(s): POCGLU in the last 72 hours. I/O (24Hr):     Intake/Output Summary (Last 24 hours) at 2023 1409  Last data filed at 2023 1224  Gross per 24 hour   Intake 9.11 ml   Output 900 ml   Net 1129.11 ml       Labs:  Hematology:  Recent Labs     23  1039 23  0624   WBC 6.7 10.7   RBC 4.78 4.45   HGB 14.9 13.7   HCT 41.2 39.0   MCV 86.2 87.6   MCH 31.2 30.8   MCHC 36.2* 35.1*   RDW 12.0 12.1    373   MPV 10.5 9.3     Chemistry:  Recent Labs     23  1039 23  0624    136   K 3.6* 3.8    102   CO2 23 22   GLUCOSE 127* 110*   BUN 11 9   CREATININE 0.72 0.62   ANIONGAP 14 12   LABGLOM >60 >60   CALCIUM 9.5 9.2     Recent Labs     23  1039 23  0624   PROT 7.6 7.0   LABALBU 4.8 4.5   AST 19 17   ALT 14 15   ALKPHOS 42 43   BILITOT 0.4 0.5   LIPASE 17  --      ABG:No results found for: POCPH, PHART, PH, POCPCO2, TCZ4LYU, PCO2, POCPO2, PO2ART, PO2, POCHCO3, QYQ9CJJ, HCO3, NBEA, PBEA, BEART, BE, THGBART, THB, VQR0FLB, WSMG8FSE, Q2BCJBWV, O2SAT, FIO2  Lab Results   Component Value Date/Time    SPECIAL NOT REPORTED 2012 02:50 PM     Lab Results   Component Value Date/Time    CULTURE NO SIGNIFICANT GROWTH 2012 02:50 PM    CULTURE  2012 02:50 PM     [employer] [address] [facility] 3 [phone]       Radiology:  CT ABDOMEN PELVIS W IV CONTRAST

## 2023-05-11 NOTE — DISCHARGE SUMMARY
daily            STOP taking these medications      cyclobenzaprine 5 MG tablet  Commonly known as: FLEXERIL     dicyclomine 10 MG capsule  Commonly known as: Bentyl               Where to Get Your Medications        These medications were sent to [address] [place] [address],  [address] [address]      Phone: [phone]   metoclopramide 5 MG tablet         Discharge Procedure Orders   [facility] ([address] [place] [address] Rd) - Dariela Thrasher MD, Nephrology, [facility]   Referral Priority: Routine Referral Type: Eval and Treat   Referral Reason: Specialty Services Required   Referred to Provider: Wai Moulton Requested Specialty: Nephrology   Number of Visits Requested: 1       Time Spent on discharge is  33 mins in patient examination, evaluation, counseling as well as medication reconciliation, prescriptions for required medications, discharge plan and follow up. Electronically signed by   Steven Maloney MD  5/11/2023  8:02 PM      Thank you Dr. Chetan Aldana MD for the opportunity to be involved in this patient's care.

## 2023-05-11 NOTE — PLAN OF CARE
Problem: Discharge Planning  Goal: Discharge to home or other facility with appropriate resources  Outcome: Adequate for Discharge  Flowsheets (Taken 5/11/2023 0841)  Discharge to home or other facility with appropriate resources: Identify barriers to discharge with patient and caregiver     Problem: Pain  Goal: Verbalizes/displays adequate comfort level or baseline comfort level  Outcome: Adequate for Discharge     Problem: Cardiovascular - Adult  Goal: Maintains optimal cardiac output and hemodynamic stability  Outcome: Adequate for Discharge  Flowsheets (Taken 5/11/2023 0841)  Maintains optimal cardiac output and hemodynamic stability: Monitor blood pressure and heart rate     Problem: Musculoskeletal - Adult  Goal: Return mobility to safest level of function  Outcome: Adequate for Discharge  Flowsheets (Taken 5/11/2023 0841)  Return Mobility to Safest Level of Function: Assess patient stability and activity tolerance for standing, transferring and ambulating with or without assistive devices     Problem: Gastrointestinal - Adult  Goal: Minimal or absence of nausea and vomiting  Outcome: Adequate for Discharge  Flowsheets (Taken 5/11/2023 0841)  Minimal or absence of nausea and vomiting: Administer IV fluids as ordered to ensure adequate hydration  Goal: Maintains adequate nutritional intake  Outcome: Adequate for Discharge  Flowsheets (Taken 5/11/2023 0841)  Maintains adequate nutritional intake: Monitor percentage of each meal consumed

## 2023-05-11 NOTE — PLAN OF CARE
Problem: Discharge Planning  Goal: Discharge to home or other facility with appropriate resources  5/10/2023 2057 by Phoenix Noguera RN  Outcome: Progressing  Flowsheets (Taken 5/10/2023 2055)  Discharge to home or other facility with appropriate resources:   Identify barriers to discharge with patient and caregiver   Arrange for needed discharge resources and transportation as appropriate   Identify discharge learning needs (meds, wound care, etc)   Refer to discharge planning if patient needs post-hospital services based on physician order or complex needs related to functional status, cognitive ability or social support system     Problem: Pain  Goal: Verbalizes/displays adequate comfort level or baseline comfort level  5/10/2023 2057 by Phoenix Noguera RN  Outcome: Progressing     Problem: Cardiovascular - Adult  Goal: Maintains optimal cardiac output and hemodynamic stability  5/10/2023 2057 by Phoenix Noguera RN  Outcome: Progressing     Problem: Musculoskeletal - Adult  Goal: Return mobility to safest level of function  5/10/2023 2057 by Phoenix Noguera RN  Outcome: Progressing  Flowsheets (Taken 5/10/2023 2055)  Return Mobility to Safest Level of Function:   Assess patient stability and activity tolerance for standing, transferring and ambulating with or without assistive devices   Assist with transfers and ambulation using safe patient handling equipment as needed   Ensure adequate protection for wounds/incisions during mobilization   Obtain physical therapy/occupational therapy consults as needed   Apply continuous passive motion per provider or physical therapy orders to increase flexion toward goal   Instruct patient/family in ordered activity level     Problem: Gastrointestinal - Adult  Goal: Minimal or absence of nausea and vomiting  5/10/2023 2057 by Phoenix Noguera RN  Outcome: Progressing  Flowsheets (Taken 5/10/2023 2055)  Minimal or absence of nausea and vomiting:

## 2023-05-12 RX ORDER — LACTOSE-REDUCED FOOD
LIQUID (ML) ORAL
OUTPATIENT
Start: 2023-05-12

## 2023-05-17 DIAGNOSIS — R11.2 INTRACTABLE NAUSEA AND VOMITING: ICD-10-CM

## 2023-05-17 DIAGNOSIS — M54.2 NECK PAIN ON LEFT SIDE: ICD-10-CM

## 2023-05-17 DIAGNOSIS — R19.7 DIARRHEA, UNSPECIFIED TYPE: ICD-10-CM

## 2023-05-18 NOTE — TELEPHONE ENCOUNTER
Last visit: 5/2/23  Last Med refill:   Does patient have enough medication for 72 hours: No:     Next Visit Date:  Future Appointments   Date Time Provider Rachel Adkins   7/12/2023 10:40 AM Leti Blake MD Neuro Spec Neurology -       Health Maintenance   Topic Date Due    Cervical cancer screen  02/05/2021    COVID-19 Vaccine (3 - Booster for Moderna series) 08/28/2021    Depression Screen  01/24/2024    Lipids  06/11/2025    DTaP/Tdap/Td vaccine (2 - Td or Tdap) 09/13/2026    Flu vaccine  Completed    Pneumococcal 0-64 years Vaccine  Completed    Hepatitis C screen  Completed    HIV screen  Completed    Hepatitis A vaccine  Aged Out    Hib vaccine  Aged Out    Meningococcal (ACWY) vaccine  Aged Out    Varicella vaccine  Discontinued       No results found for: LABA1C          ( goal A1C is < 7)   No results found for: LABMICR  LDL Cholesterol (mg/dL)   Date Value   06/11/2020 97       (goal LDL is <100)   AST (U/L)   Date Value   05/10/2023 19     ALT (U/L)   Date Value   05/10/2023 17     BUN (mg/dL)   Date Value   05/10/2023 11     BP Readings from Last 3 Encounters:   05/11/23 125/83   05/02/23 120/79   03/09/23 138/70          (goal 120/80)    All Future Testing planned in [facility]  Lab Frequency Next Occurrence   Basic Metabolic Panel Once 72/13/5519   Vitamin D 25 Hydroxy Once 05/02/2023   Hemoglobin A1C Once 05/02/2023               Patient Active Problem List:     Mental retardation     History of seizure disorder     Intractable nausea and vomiting     Moderate persistent asthma without complication     Cyclical vomiting     Cholangitis     Generalized abdominal pain     Tetrahydrocannabinol (THC) dependence ([facility] [address]     Cocaine abuse ([facility] [address]

## 2023-05-19 RX ORDER — LACTOSE-REDUCED FOOD
LIQUID (ML) ORAL
OUTPATIENT
Start: 2023-05-19

## 2023-05-21 RX ORDER — ACETAMINOPHEN 500 MG
TABLET ORAL
Qty: 120 TABLET | Refills: 2 | Status: SHIPPED | OUTPATIENT
Start: 2023-05-21

## 2023-06-05 ENCOUNTER — HOSPITAL ENCOUNTER (OUTPATIENT)
Age: 43
Discharge: HOME | End: 2023-06-05
Payer: COMMERCIAL

## 2023-06-05 DIAGNOSIS — R73.9 HYPERGLYCEMIA: ICD-10-CM

## 2023-06-05 DIAGNOSIS — E55.9 VITAMIN D DEFICIENCY: ICD-10-CM

## 2023-06-05 LAB
25(OH)D3 SERPL-MCNC: 37.9 NG/ML
EST. AVERAGE GLUCOSE BLD GHB EST-MCNC: 85 MG/DL
HBA1C MFR BLD: 4.6 % (ref 4–6)

## 2023-06-05 PROCEDURE — 83036 HEMOGLOBIN GLYCOSYLATED A1C: CPT

## 2023-06-05 PROCEDURE — 36415 COLL VENOUS BLD VENIPUNCTURE: CPT

## 2023-06-05 PROCEDURE — 82306 VITAMIN D 25 HYDROXY: CPT

## 2023-06-14 DIAGNOSIS — R11.2 INTRACTABLE NAUSEA AND VOMITING: ICD-10-CM

## 2023-06-14 DIAGNOSIS — R19.7 DIARRHEA, UNSPECIFIED TYPE: ICD-10-CM

## 2023-06-16 RX ORDER — LACTOSE-REDUCED FOOD
LIQUID (ML) ORAL
OUTPATIENT
Start: 2023-06-16

## 2023-07-05 DIAGNOSIS — R11.2 INTRACTABLE NAUSEA AND VOMITING: ICD-10-CM

## 2023-07-10 NOTE — TELEPHONE ENCOUNTER
Last visit: 5/2/23  Last Med refill:   Does patient have enough medication for 72 hours: No:     Next Visit Date:  Future Appointments   Date Time Provider [address]   7/12/2023 10:40 AM Gerber Moreira MD Neuro Spec Neurology -       Health Maintenance   Topic Date Due    Cervical cancer screen  02/05/2021    COVID-19 Vaccine (3 - Booster for Moderna series) 08/28/2021    Flu vaccine (1) 08/01/2023    Depression Screen  01/24/2024    Lipids  06/11/2025    DTaP/Tdap/Td vaccine (2 - Td or Tdap) 09/13/2026    Pneumococcal 0-64 years Vaccine  Completed    Hepatitis C screen  Completed    HIV screen  Completed    Hepatitis A vaccine  Aged Out    Hib vaccine  Aged Out    Meningococcal (ACWY) vaccine  Aged Out    Varicella vaccine  Discontinued       Hemoglobin A1C (%)   Date Value   06/05/2023 4.6             ( goal A1C is < 7)   No results found for: LABMICR  LDL Cholesterol (mg/dL)   Date Value   06/11/2020 97       (goal LDL is <100)   AST (U/L)   Date Value   05/10/2023 19     ALT (U/L)   Date Value   05/10/2023 17     BUN (mg/dL)   Date Value   05/10/2023 11     BP Readings from Last 3 Encounters:   05/11/23 125/83   05/02/23 120/79   03/09/23 138/70          (goal 120/80)    All Future Testing planned in [facility]  Lab Frequency Next Occurrence   Basic Metabolic Panel Once 47/65/2495               Patient Active Problem List:     Mental retardation     History of seizure disorder     Intractable nausea and vomiting     Moderate persistent asthma without complication     Cyclical vomiting     Cholangitis     Generalized abdominal pain     Tetrahydrocannabinol (THC) dependence ([address])     Cocaine abuse ([address])

## 2023-07-12 RX ORDER — ONDANSETRON 4 MG/1
TABLET, FILM COATED ORAL
Qty: 15 TABLET | Refills: 3 | Status: SHIPPED | OUTPATIENT
Start: 2023-07-12

## 2023-08-09 DIAGNOSIS — M54.2 NECK PAIN ON LEFT SIDE: ICD-10-CM

## 2023-08-09 NOTE — TELEPHONE ENCOUNTER
Request for tylenol      Next Visit Date:last seen 5/2/23  No future appointments.     Health Maintenance   Topic Date Due    Cervical cancer screen  02/05/2021    COVID-19 Vaccine (3 - Booster for Moderna series) 08/28/2021    Flu vaccine (1) 08/01/2023    Depression Screen  01/24/2024    Lipids  06/11/2025    DTaP/Tdap/Td vaccine (2 - Td or Tdap) 09/13/2026    Pneumococcal 0-64 years Vaccine  Completed    Hepatitis C screen  Completed    HIV screen  Completed    Hepatitis A vaccine  Aged Out    Hib vaccine  Aged Out    Meningococcal (ACWY) vaccine  Aged Out    Varicella vaccine  Discontinued       Hemoglobin A1C (%)   Date Value   06/05/2023 4.6             ( goal A1C is < 7)   No components found for: LABMICR  LDL Cholesterol (mg/dL)   Date Value   06/11/2020 97       (goal LDL is <100)   AST (U/L)   Date Value   05/10/2023 19     ALT (U/L)   Date Value   05/10/2023 17     BUN (mg/dL)   Date Value   05/10/2023 11     BP Readings from Last 3 Encounters:   05/11/23 125/83   05/02/23 120/79   03/09/23 138/70          (goal 120/80)    All Future Testing planned in [facility]  Lab Frequency Next Occurrence   Basic Metabolic Panel Once 25/27/8803         Patient Active Problem List:     Mental retardation     History of seizure disorder     Intractable nausea and vomiting     Moderate persistent asthma without complication     Cyclical vomiting     Cholangitis     Generalized abdominal pain     Tetrahydrocannabinol (THC) dependence ([address])     Cocaine abuse ([address]) Yes

## 2023-08-12 RX ORDER — ACETAMINOPHEN 500 MG
TABLET ORAL
Qty: 120 TABLET | Refills: 2 | Status: SHIPPED | OUTPATIENT
Start: 2023-08-12

## 2023-09-06 DIAGNOSIS — R11.2 INTRACTABLE NAUSEA AND VOMITING: ICD-10-CM

## 2023-09-06 DIAGNOSIS — R19.7 DIARRHEA, UNSPECIFIED TYPE: ICD-10-CM

## 2023-09-06 NOTE — TELEPHONE ENCOUNTER
Last visit: 5/2/23  Last Med refill: 5/2/23  Does patient have enough medication for 72 hours: No:     Next Visit Date:  No future appointments.     Health Maintenance   Topic Date Due    Cervical cancer screen  02/05/2021    COVID-19 Vaccine (3 - Booster for Moderna series) 08/28/2021    Flu vaccine (1) 08/01/2023    Depression Screen  01/24/2024    Lipids  06/11/2025    DTaP/Tdap/Td vaccine (2 - Td or Tdap) 09/13/2026    Pneumococcal 0-64 years Vaccine  Completed    Hepatitis C screen  Completed    HIV screen  Completed    Hepatitis A vaccine  Aged Out    Hib vaccine  Aged Out    Meningococcal (ACWY) vaccine  Aged Out    Varicella vaccine  Discontinued       Hemoglobin A1C (%)   Date Value   06/05/2023 4.6             ( goal A1C is < 7)   No components found for: LABMICR  LDL Cholesterol (mg/dL)   Date Value   06/11/2020 97       (goal LDL is <100)   AST (U/L)   Date Value   05/10/2023 19     ALT (U/L)   Date Value   05/10/2023 17     BUN (mg/dL)   Date Value   05/10/2023 11     BP Readings from Last 3 Encounters:   05/11/23 125/83   05/02/23 120/79   03/09/23 138/70          (goal 120/80)    All Future Testing planned in [facility]  Lab Frequency Next Occurrence   Basic Metabolic Panel Once 70/93/8383               Patient Active Problem List:     Mental retardation     History of seizure disorder     Intractable nausea and vomiting     Moderate persistent asthma without complication     Cyclical vomiting     Cholangitis     Generalized abdominal pain     Tetrahydrocannabinol (THC) dependence ([address])     Cocaine abuse ([address])

## 2023-09-07 RX ORDER — MULTIVITAMIN WITH FOLIC ACID 400 MCG
TABLET ORAL
Qty: 30 TABLET | Refills: 3 | Status: SHIPPED | OUTPATIENT
Start: 2023-09-07

## 2023-09-09 DIAGNOSIS — K21.9 GASTROESOPHAGEAL REFLUX DISEASE WITHOUT ESOPHAGITIS: ICD-10-CM

## 2023-09-11 RX ORDER — PANTOPRAZOLE SODIUM 40 MG/1
TABLET, DELAYED RELEASE ORAL
Qty: 90 TABLET | Refills: 3 | Status: SHIPPED | OUTPATIENT
Start: 2023-09-11

## 2023-09-11 NOTE — TELEPHONE ENCOUNTER
Pharmacy requesting refills for Pantoprazole 40mg tablets. Please review and e-scribe to pharmacy listed in chart if appropriate. Thank you. Next Visit Date: LVM to schedule f/u appt  Last Visit Date: 5/2/23    No future appointments.     Health Maintenance   Topic Date Due    Hepatitis B vaccine (1 of 3 - 3-dose series) Never done    Pneumococcal 0-64 years Vaccine (2 - PCV) 01/16/2018    Cervical cancer screen  02/05/2021    COVID-19 Vaccine (3 - Moderna series) 08/28/2021    Flu vaccine (1) 08/01/2023    Depression Screen  01/24/2024    Lipids  06/11/2025    DTaP/Tdap/Td vaccine (2 - Td or Tdap) 09/13/2026    Hepatitis C screen  Completed    HIV screen  Completed    Hepatitis A vaccine  Aged Out    Hib vaccine  Aged Out    HPV vaccine  Aged Out    Meningococcal (ACWY) vaccine  Aged Out    Varicella vaccine  Discontinued       Hemoglobin A1C (%)   Date Value   06/05/2023 4.6             ( goal A1C is < 7)   No components found for: \"LABMICR\"  LDL Cholesterol (mg/dL)   Date Value   06/11/2020 97       (goal LDL is <100)   AST (U/L)   Date Value   05/10/2023 19     ALT (U/L)   Date Value   05/10/2023 17     BUN (mg/dL)   Date Value   05/10/2023 11     BP Readings from Last 3 Encounters:   05/11/23 125/83   05/02/23 120/79   03/09/23 138/70          (goal 120/80)    All Future Testing planned in [facility]  Lab Frequency Next Occurrence   Basic Metabolic Panel Once 41/76/9213         Patient Active Problem List:     Mental retardation     History of seizure disorder     Intractable nausea and vomiting     Moderate persistent asthma without complication     Cyclical vomiting     Cholangitis     Generalized abdominal pain     Tetrahydrocannabinol (THC) dependence ([address])     Cocaine abuse ([address])

## 2023-09-21 DIAGNOSIS — M54.2 NECK PAIN ON LEFT SIDE: ICD-10-CM

## 2023-09-21 NOTE — TELEPHONE ENCOUNTER
Last visit: 5/2/23  Last Med refill:   Does patient have enough medication for 72 hours: No:     Next Visit Date:  No future appointments.     Health Maintenance   Topic Date Due    Hepatitis B vaccine (1 of 3 - 3-dose series) Never done    Pneumococcal 0-64 years Vaccine (2 - PCV) 01/16/2018    Cervical cancer screen  02/05/2021    COVID-19 Vaccine (3 - Moderna series) 08/28/2021    Flu vaccine (1) 08/01/2023    Depression Screen  01/24/2024    Lipids  06/11/2025    DTaP/Tdap/Td vaccine (2 - Td or Tdap) 09/13/2026    Hepatitis C screen  Completed    HIV screen  Completed    Hepatitis A vaccine  Aged Out    Hib vaccine  Aged Out    HPV vaccine  Aged Out    Meningococcal (ACWY) vaccine  Aged Out    Varicella vaccine  Discontinued       Hemoglobin A1C (%)   Date Value   06/05/2023 4.6             ( goal A1C is < 7)   No components found for: \"LABMICR\"  LDL Cholesterol (mg/dL)   Date Value   06/11/2020 97       (goal LDL is <100)   AST (U/L)   Date Value   05/10/2023 19     ALT (U/L)   Date Value   05/10/2023 17     BUN (mg/dL)   Date Value   05/10/2023 11     BP Readings from Last 3 Encounters:   05/11/23 125/83   05/02/23 120/79   03/09/23 138/70          (goal 120/80)    All Future Testing planned in [facility]  Lab Frequency Next Occurrence   Basic Metabolic Panel Once 83/75/7970               Patient Active Problem List:     Mental retardation     History of seizure disorder     Intractable nausea and vomiting     Moderate persistent asthma without complication     Cyclical vomiting     Cholangitis     Generalized abdominal pain     Tetrahydrocannabinol (THC) dependence ([address])     Cocaine abuse ([address])

## 2023-09-24 RX ORDER — ACETAMINOPHEN 500 MG
TABLET ORAL
Qty: 120 TABLET | Refills: 2 | Status: SHIPPED | OUTPATIENT
Start: 2023-09-24

## 2023-10-05 DIAGNOSIS — M62.838 MUSCLE SPASM: ICD-10-CM

## 2023-10-05 RX ORDER — DICYCLOMINE HYDROCHLORIDE 10 MG/1
CAPSULE ORAL
Qty: 90 CAPSULE | Refills: 3 | OUTPATIENT
Start: 2023-10-05

## 2023-10-25 DIAGNOSIS — J45.20 MILD INTERMITTENT REACTIVE AIRWAY DISEASE WITHOUT COMPLICATION: ICD-10-CM

## 2023-10-26 NOTE — TELEPHONE ENCOUNTER
Request for   Requested Prescriptions     Pending Prescriptions Disp Refills    FLOVENT  MCG/ACT inhaler [Pharmacy Med Name: FLOVENT  MCG INHALER] 12 g 3     Sig: INHALE TWO PUFFS BY MOUTH TWICE A DAY    . Please review and e-scribe to pharmacy listed in chart if appropriate. Thank you. Last Visit Date: 5/2/2023  Next Visit Date: Visit date not found    No future appointments.     Health Maintenance   Topic Date Due    Hepatitis B vaccine (1 of 3 - 3-dose series) Never done    Pneumococcal 0-64 years Vaccine (2 - PCV) 01/16/2018    Cervical cancer screen  02/05/2021    COVID-19 Vaccine (3 - Moderna series) 08/28/2021    Flu vaccine (1) 08/01/2023    Depression Screen  01/24/2024    Lipids  06/11/2025    DTaP/Tdap/Td vaccine (2 - Td or Tdap) 09/13/2026    Hepatitis C screen  Completed    HIV screen  Completed    Hepatitis A vaccine  Aged Out    Hib vaccine  Aged Out    HPV vaccine  Aged Out    Meningococcal (ACWY) vaccine  Aged Out    Varicella vaccine  Discontinued       Hemoglobin A1C (%)   Date Value   06/05/2023 4.6             ( goal A1C is < 7)   No components found for: \"LABMICR\"  LDL Cholesterol (mg/dL)   Date Value   06/11/2020 97       (goal LDL is <100)   AST (U/L)   Date Value   05/10/2023 19     ALT (U/L)   Date Value   05/10/2023 17     BUN (mg/dL)   Date Value   05/10/2023 11     BP Readings from Last 3 Encounters:   05/11/23 125/83   05/02/23 120/79   03/09/23 138/70          (goal 120/80)    All Future Testing planned in [facility]  Lab Frequency Next Occurrence   Basic Metabolic Panel Once 80/30/8530         Patient Active Problem List:     Mental retardation     History of seizure disorder     Intractable nausea and vomiting     Moderate persistent asthma without complication     Cyclical vomiting     Cholangitis     Generalized abdominal pain     Tetrahydrocannabinol (THC) dependence ([address])     Cocaine abuse ([address])

## 2023-10-31 RX ORDER — FLUTICASONE PROPIONATE 220 UG/1
2 AEROSOL, METERED RESPIRATORY (INHALATION) 2 TIMES DAILY
Qty: 12 G | Refills: 3 | Status: SHIPPED | OUTPATIENT
Start: 2023-10-31

## 2023-11-17 DIAGNOSIS — R56.01 COMPLEX FEBRILE CONVULSION (HCC): ICD-10-CM

## 2023-11-17 DIAGNOSIS — J45.20 MILD INTERMITTENT REACTIVE AIRWAY DISEASE WITHOUT COMPLICATION: ICD-10-CM

## 2023-11-17 DIAGNOSIS — R05.3 CHRONIC COUGH: ICD-10-CM

## 2023-11-17 RX ORDER — LEVETIRACETAM 750 MG/1
TABLET ORAL
Qty: 60 TABLET | Refills: 0 | Status: SHIPPED | OUTPATIENT
Start: 2023-11-17

## 2023-11-17 RX ORDER — ALBUTEROL SULFATE 90 UG/1
AEROSOL, METERED RESPIRATORY (INHALATION)
Qty: 8.5 G | Refills: 3 | Status: SHIPPED | OUTPATIENT
Start: 2023-11-17

## 2023-11-17 NOTE — TELEPHONE ENCOUNTER
Request for   Requested Prescriptions     Pending Prescriptions Disp Refills    albuterol sulfate HFA (PROVENTIL;VENTOLIN;PROAIR) 108 (90 Base) MCG/ACT inhaler [Pharmacy Med Name: ALBUTEROL HFA 90 MCG INHALER] 8.5 g 3     Sig: INHALE TWO PUFFS BY MOUTH EVERY 6 HOURS AS NEEDED FOR WHEEZING    levETIRAcetam (KEPPRA) 750 MG tablet [Pharmacy Med Name: levETIRAcetam 750 MG TABLET] 60 tablet      Sig: TAKE ONE TABLET BY MOUTH TWICE A DAY    . Please review and e-scribe to pharmacy listed in chart if appropriate. Thank you.       Last Visit Date: 5/2/2023  Next Visit Date: Visit date not found

## 2023-11-21 DIAGNOSIS — R56.01 COMPLEX FEBRILE CONVULSION (HCC): ICD-10-CM

## 2023-11-22 RX ORDER — LEVETIRACETAM 750 MG/1
TABLET ORAL
Qty: 60 TABLET | Refills: 0 | OUTPATIENT
Start: 2023-11-22

## 2023-12-05 DIAGNOSIS — R11.2 INTRACTABLE NAUSEA AND VOMITING: ICD-10-CM

## 2023-12-05 DIAGNOSIS — R19.7 DIARRHEA, UNSPECIFIED TYPE: ICD-10-CM

## 2023-12-05 NOTE — TELEPHONE ENCOUNTER
Scheduled pt for appt 12/19/23  Last visit: 5/2/23  Last Med refill: 7/12/23  Does patient have enough medication for 72 hours: No:     Next Visit Date:  Future Appointments   Date Time Provider [address]   12/19/2023  1:00 PM Herrera Pelaez MD [address] Maintenance   Topic Date Due    Hepatitis B vaccine (1 of 3 - 3-dose series) Never done    Pneumococcal 0-64 years Vaccine (2 - PCV) 01/16/2018    Cervical cancer screen  02/05/2021    Flu vaccine (1) 08/01/2023    COVID-19 Vaccine (3 - 2023-24 season) 09/01/2023    Depression Screen  01/24/2024    Lipids  06/11/2025    DTaP/Tdap/Td vaccine (2 - Td or Tdap) 09/13/2026    Hepatitis C screen  Completed    HIV screen  Completed    Hepatitis A vaccine  Aged Out    Hib vaccine  Aged Out    HPV vaccine  Aged Out    Meningococcal (ACWY) vaccine  Aged Out    Varicella vaccine  Discontinued       Hemoglobin A1C (%)   Date Value   06/05/2023 4.6             ( goal A1C is < 7)   No components found for: \"LABMICR\"  LDL Cholesterol (mg/dL)   Date Value   06/11/2020 97       (goal LDL is <100)   AST (U/L)   Date Value   05/10/2023 19     ALT (U/L)   Date Value   05/10/2023 17     BUN (mg/dL)   Date Value   05/10/2023 11     BP Readings from Last 3 Encounters:   05/11/23 125/83   05/02/23 120/79   03/09/23 138/70          (goal 120/80)    All Future Testing planned in [facility]  Lab Frequency Next Occurrence   Basic Metabolic Panel Once 81/36/6342               Patient Active Problem List:     Mental retardation     History of seizure disorder     Intractable nausea and vomiting     Moderate persistent asthma without complication     Cyclical vomiting     Cholangitis     Generalized abdominal pain     Tetrahydrocannabinol (THC) dependence ([address])     Cocaine abuse ([address])

## 2023-12-07 RX ORDER — LACTOSE-REDUCED FOOD
LIQUID (ML) ORAL
Qty: 30 EACH | Refills: 5 | Status: SHIPPED | OUTPATIENT
Start: 2023-12-07

## 2023-12-07 RX ORDER — ONDANSETRON 4 MG/1
TABLET, FILM COATED ORAL
Qty: 15 TABLET | Refills: 3 | Status: SHIPPED | OUTPATIENT
Start: 2023-12-07

## 2023-12-26 ENCOUNTER — OFFICE VISIT (OUTPATIENT)
Dept: INTERNAL MEDICINE | Age: 43
End: 2023-12-26
Payer: COMMERCIAL

## 2023-12-26 VITALS
SYSTOLIC BLOOD PRESSURE: 123 MMHG | BODY MASS INDEX: 20.83 KG/M2 | TEMPERATURE: 97.9 F | DIASTOLIC BLOOD PRESSURE: 89 MMHG | HEART RATE: 66 BPM | OXYGEN SATURATION: 99 % | HEIGHT: 62 IN | WEIGHT: 113.2 LBS

## 2023-12-26 DIAGNOSIS — Z23 NEEDS FLU SHOT: ICD-10-CM

## 2023-12-26 DIAGNOSIS — R05.3 CHRONIC COUGH: ICD-10-CM

## 2023-12-26 DIAGNOSIS — R56.01 COMPLEX FEBRILE CONVULSION (HCC): ICD-10-CM

## 2023-12-26 DIAGNOSIS — R19.7 DIARRHEA, UNSPECIFIED TYPE: ICD-10-CM

## 2023-12-26 DIAGNOSIS — K21.9 GASTROESOPHAGEAL REFLUX DISEASE WITHOUT ESOPHAGITIS: ICD-10-CM

## 2023-12-26 DIAGNOSIS — E44.1 MILD PROTEIN-CALORIE MALNUTRITION (HCC): ICD-10-CM

## 2023-12-26 DIAGNOSIS — J45.20 MILD INTERMITTENT REACTIVE AIRWAY DISEASE WITHOUT COMPLICATION: ICD-10-CM

## 2023-12-26 DIAGNOSIS — N30.00 ACUTE CYSTITIS WITHOUT HEMATURIA: Primary | ICD-10-CM

## 2023-12-26 DIAGNOSIS — R11.2 INTRACTABLE NAUSEA AND VOMITING: ICD-10-CM

## 2023-12-26 PROCEDURE — G8482 FLU IMMUNIZE ORDER/ADMIN: HCPCS

## 2023-12-26 PROCEDURE — 99211 OFF/OP EST MAY X REQ PHY/QHP: CPT | Performed by: INTERNAL MEDICINE

## 2023-12-26 PROCEDURE — 90686 IIV4 VACC NO PRSV 0.5 ML IM: CPT | Performed by: INTERNAL MEDICINE

## 2023-12-26 PROCEDURE — G8420 CALC BMI NORM PARAMETERS: HCPCS

## 2023-12-26 PROCEDURE — G8427 DOCREV CUR MEDS BY ELIG CLIN: HCPCS

## 2023-12-26 PROCEDURE — 99213 OFFICE O/P EST LOW 20 MIN: CPT

## 2023-12-26 PROCEDURE — 1036F TOBACCO NON-USER: CPT

## 2023-12-26 RX ORDER — PANTOPRAZOLE SODIUM 40 MG/1
40 TABLET, DELAYED RELEASE ORAL
Qty: 90 TABLET | Refills: 3 | Status: SHIPPED | OUTPATIENT
Start: 2023-12-26

## 2023-12-26 RX ORDER — FLUTICASONE PROPIONATE 220 UG/1
2 AEROSOL, METERED RESPIRATORY (INHALATION) 2 TIMES DAILY
Qty: 12 G | Refills: 3 | Status: SHIPPED | OUTPATIENT
Start: 2023-12-26

## 2023-12-26 RX ORDER — LACTOSE-REDUCED FOOD
LIQUID (ML) ORAL
Qty: 30 EACH | Refills: 5 | Status: SHIPPED | OUTPATIENT
Start: 2023-12-26

## 2023-12-26 RX ORDER — ALBUTEROL SULFATE 90 UG/1
2 AEROSOL, METERED RESPIRATORY (INHALATION) EVERY 6 HOURS PRN
Qty: 8.5 G | Refills: 3 | Status: SHIPPED | OUTPATIENT
Start: 2023-12-26

## 2023-12-26 RX ORDER — CEPHALEXIN 250 MG/1
250 CAPSULE ORAL 4 TIMES DAILY
Qty: 20 CAPSULE | Refills: 0 | Status: SHIPPED | OUTPATIENT
Start: 2023-12-26 | End: 2023-12-31

## 2023-12-26 RX ORDER — LEVETIRACETAM 750 MG/1
750 TABLET ORAL 2 TIMES DAILY
Qty: 60 TABLET | Refills: 0 | Status: SHIPPED | OUTPATIENT
Start: 2023-12-26

## 2023-12-26 RX ORDER — ONDANSETRON 4 MG/1
TABLET, FILM COATED ORAL
Qty: 15 TABLET | Refills: 3 | Status: SHIPPED | OUTPATIENT
Start: 2023-12-26

## 2023-12-26 RX ORDER — MULTIVITAMIN WITH FOLIC ACID 400 MCG
1 TABLET ORAL DAILY
Qty: 30 TABLET | Refills: 3 | Status: SHIPPED | OUTPATIENT
Start: 2023-12-26

## 2023-12-26 NOTE — PROGRESS NOTES
[facility]  [facility] [address]   [address] [facility] [address]  Dept: [phone]  Dept Fax: [phone]    Office Progress/Follow Up Note  Date of patient's visit: 12/26/2023  Patient's Name:  Alonzo Mckeon YOB: 1980            Patient Care Team:  Verónica Wynne MD as PCP - General (Internal Medicine)  All Lopez MD as Consulting Physician (Internal Medicine)  ________________________________________________________________________      Reason for Visit: Same day visit  ________________________________________________________________________  Chief Complaint:  Urinary Frequency (X 2 month, no discharge, smell or burning) and Health Maintenance (Pended flu)    ________________________________________________________________________  History of Presenting Illness:  History was obtained from: patient, electronic medical record. Alonzo Mckeon is a 41 y.o. is here for:    Urinary frequency that has been going on for few days. Patient denies dysuria, hematuria, pelvic pain, discharges or back pain. Patient is Currently sexually active. Patient also associated her polyuria to drinking water. Denies fevers, chills, dizziness chest pain, nausea or vomiting. Patient is also asking for refill of her almost all medications.       Patient Active Problem List   Diagnosis    Mental retardation    History of seizure disorder    Intractable nausea and vomiting    Moderate persistent asthma without complication    Cyclical vomiting    Cholangitis    Generalized abdominal pain    Tetrahydrocannabinol (THC) dependence (HCC)    Cocaine abuse ([facility])       Allergies   Allergen Reactions    Omeprazole      Pt had a seizure         Current Outpatient Medications   Medication Sig Dispense Refill    pantoprazole (PROTONIX) 40 MG tablet Take 1 tablet by mouth every morning (before breakfast) 90 tablet 3    ondansetron (ZOFRAN) 4 MG tablet TAKE 1 TABLET BY MOUTH 3 TIMES A DAY

## 2023-12-27 ENCOUNTER — HOSPITAL ENCOUNTER (OUTPATIENT)
Age: 43
Setting detail: SPECIMEN
Discharge: HOME | End: 2023-12-27

## 2023-12-27 DIAGNOSIS — N30.00 ACUTE CYSTITIS WITHOUT HEMATURIA: ICD-10-CM

## 2023-12-28 LAB
MICROORGANISM SPEC CULT: NORMAL
SPECIMEN DESCRIPTION: NORMAL

## 2024-01-21 DIAGNOSIS — R56.01 COMPLEX FEBRILE CONVULSION (HCC): ICD-10-CM

## 2024-01-22 RX ORDER — LEVETIRACETAM 750 MG/1
750 TABLET ORAL 2 TIMES DAILY
Qty: 60 TABLET | Refills: 0 | Status: SHIPPED | OUTPATIENT
Start: 2024-01-22

## 2024-01-22 NOTE — TELEPHONE ENCOUNTER
A Refill Has Been Requested for Scarlet Trevizo    Medication Requested  Requested Prescriptions     Pending Prescriptions Disp Refills    levETIRAcetam (KEPPRA) 750 MG tablet [Pharmacy Med Name: levETIRAcetam 750 MG TABLET] 60 tablet 0     Sig: TAKE 1 TABLET BY MOUTH TWICE A DAY       Last Visit Date (If Applicable)  Visit date not found    Next Visit Date (If Applicable)  Visit date not found

## 2024-01-23 DIAGNOSIS — R56.01 COMPLEX FEBRILE CONVULSION (HCC): ICD-10-CM

## 2024-01-23 RX ORDER — LEVETIRACETAM 750 MG/1
750 TABLET ORAL 2 TIMES DAILY
Qty: 60 TABLET | Refills: 0 | OUTPATIENT
Start: 2024-01-23

## 2024-01-29 DIAGNOSIS — N30.00 ACUTE CYSTITIS WITHOUT HEMATURIA: ICD-10-CM

## 2024-01-29 NOTE — TELEPHONE ENCOUNTER
A Refill Has Been Requested for Scarlet Trevizo    Medication Requested  Requested Prescriptions     Pending Prescriptions Disp Refills    cephALEXin (KEFLEX) 250 MG capsule [Pharmacy Med Name: CEPHALEXIN 250 MG CAPSULE] 20 capsule 0     Sig: TAKE 1 CAPSULE BY MOUTH 4 TIMES A DAY FOR 5 DAYS       Last Visit Date (If Applicable)  Visit date not found    Next Visit Date (If Applicable)  Visit date not found

## 2024-01-30 RX ORDER — CEPHALEXIN 250 MG/1
CAPSULE ORAL
Qty: 20 CAPSULE | Refills: 0 | OUTPATIENT
Start: 2024-01-30

## 2024-02-18 ENCOUNTER — HOSPITAL ENCOUNTER (EMERGENCY)
Age: 44
Discharge: HOME | End: 2024-02-18
Attending: EMERGENCY MEDICINE
Payer: COMMERCIAL

## 2024-02-18 VITALS
SYSTOLIC BLOOD PRESSURE: 125 MMHG | TEMPERATURE: 97.9 F | DIASTOLIC BLOOD PRESSURE: 104 MMHG | BODY MASS INDEX: 19.88 KG/M2 | RESPIRATION RATE: 16 BRPM | HEIGHT: 62 IN | OXYGEN SATURATION: 98 % | HEART RATE: 108 BPM | WEIGHT: 108 LBS

## 2024-02-18 DIAGNOSIS — R11.15 INTRACTABLE CYCLICAL VOMITING WITH NAUSEA: Primary | ICD-10-CM

## 2024-02-18 LAB
ALBUMIN SERPL-MCNC: 4.8 G/DL (ref 3.5–5.2)
ALP SERPL-CCNC: 54 U/L (ref 35–104)
ALT SERPL-CCNC: 15 U/L (ref 5–33)
ANION GAP SERPL CALCULATED.3IONS-SCNC: 14 MMOL/L (ref 9–17)
AST SERPL-CCNC: 19 U/L
BASOPHILS # BLD: 0.07 K/UL (ref 0–0.2)
BASOPHILS NFR BLD: 1 % (ref 0–2)
BILIRUB SERPL-MCNC: 0.4 MG/DL (ref 0.3–1.2)
BILIRUB UR QL STRIP: NEGATIVE
BUN SERPL-MCNC: 17 MG/DL (ref 6–20)
BUN/CREAT SERPL: 24 (ref 9–20)
CALCIUM SERPL-MCNC: 9.9 MG/DL (ref 8.6–10.4)
CHLORIDE SERPL-SCNC: 102 MMOL/L (ref 98–107)
CLARITY UR: CLEAR
CO2 SERPL-SCNC: 21 MMOL/L (ref 20–31)
COLOR UR: YELLOW
COMMENT: NORMAL
CREAT SERPL-MCNC: 0.7 MG/DL (ref 0.5–0.9)
EOSINOPHIL # BLD: 0.19 K/UL (ref 0–0.44)
EOSINOPHILS RELATIVE PERCENT: 2 % (ref 1–4)
ERYTHROCYTE [DISTWIDTH] IN BLOOD BY AUTOMATED COUNT: 12.2 % (ref 11.8–14.4)
GFR SERPL CREATININE-BSD FRML MDRD: >60 ML/MIN/1.73M2
GLUCOSE SERPL-MCNC: 99 MG/DL (ref 70–99)
GLUCOSE UR STRIP-MCNC: NEGATIVE MG/DL
HCT VFR BLD AUTO: 43.6 % (ref 36.3–47.1)
HGB BLD-MCNC: 15.6 G/DL (ref 11.9–15.1)
HGB UR QL STRIP.AUTO: NEGATIVE
IMM GRANULOCYTES # BLD AUTO: 0.04 K/UL (ref 0–0.3)
IMM GRANULOCYTES NFR BLD: 0 %
KETONES UR STRIP-MCNC: NEGATIVE MG/DL
LEUKOCYTE ESTERASE UR QL STRIP: NEGATIVE
LIPASE SERPL-CCNC: 23 U/L (ref 13–60)
LYMPHOCYTES NFR BLD: 1.97 K/UL (ref 1.1–3.7)
LYMPHOCYTES RELATIVE PERCENT: 20 % (ref 24–43)
MCH RBC QN AUTO: 31.7 PG (ref 25.2–33.5)
MCHC RBC AUTO-ENTMCNC: 35.8 G/DL (ref 28.4–34.8)
MCV RBC AUTO: 88.6 FL (ref 82.6–102.9)
MONOCYTES NFR BLD: 0.53 K/UL (ref 0.1–1.2)
MONOCYTES NFR BLD: 5 % (ref 3–12)
NEUTROPHILS NFR BLD: 72 % (ref 36–65)
NEUTS SEG NFR BLD: 7.22 K/UL (ref 1.5–8.1)
NITRITE UR QL STRIP: NEGATIVE
NRBC BLD-RTO: 0 PER 100 WBC
PH UR STRIP: 6 [PH] (ref 5–8)
PLATELET # BLD AUTO: 308 K/UL (ref 138–453)
PMV BLD AUTO: 9.7 FL (ref 8.1–13.5)
POTASSIUM SERPL-SCNC: 4.2 MMOL/L (ref 3.7–5.3)
PROT SERPL-MCNC: 7.7 G/DL (ref 6.4–8.3)
PROT UR STRIP-MCNC: NEGATIVE MG/DL
RBC # BLD AUTO: 4.92 M/UL (ref 3.95–5.11)
SODIUM SERPL-SCNC: 137 MMOL/L (ref 135–144)
SP GR UR STRIP: 1.02 (ref 1–1.03)
UROBILINOGEN UR STRIP-ACNC: NORMAL EU/DL (ref 0–1)
WBC OTHER # BLD: 10 K/UL (ref 3.5–11.3)

## 2024-02-18 PROCEDURE — 2500000003 HC RX 250 WO HCPCS: Performed by: EMERGENCY MEDICINE

## 2024-02-18 PROCEDURE — 2580000003 HC RX 258: Performed by: EMERGENCY MEDICINE

## 2024-02-18 PROCEDURE — 96374 THER/PROPH/DIAG INJ IV PUSH: CPT

## 2024-02-18 PROCEDURE — 80053 COMPREHEN METABOLIC PANEL: CPT

## 2024-02-18 PROCEDURE — 96361 HYDRATE IV INFUSION ADD-ON: CPT

## 2024-02-18 PROCEDURE — 99284 EMERGENCY DEPT VISIT MOD MDM: CPT

## 2024-02-18 PROCEDURE — 85025 COMPLETE CBC W/AUTO DIFF WBC: CPT

## 2024-02-18 PROCEDURE — 83690 ASSAY OF LIPASE: CPT

## 2024-02-18 PROCEDURE — 6360000002 HC RX W HCPCS: Performed by: EMERGENCY MEDICINE

## 2024-02-18 PROCEDURE — 96375 TX/PRO/DX INJ NEW DRUG ADDON: CPT

## 2024-02-18 PROCEDURE — 81003 URINALYSIS AUTO W/O SCOPE: CPT

## 2024-02-18 RX ORDER — ONDANSETRON 2 MG/ML
4 INJECTION INTRAMUSCULAR; INTRAVENOUS ONCE
Status: COMPLETED | OUTPATIENT
Start: 2024-02-18 | End: 2024-02-18

## 2024-02-18 RX ORDER — KETOROLAC TROMETHAMINE 30 MG/ML
30 INJECTION, SOLUTION INTRAMUSCULAR; INTRAVENOUS ONCE
Status: COMPLETED | OUTPATIENT
Start: 2024-02-18 | End: 2024-02-18

## 2024-02-18 RX ORDER — METOCLOPRAMIDE HYDROCHLORIDE 5 MG/ML
10 INJECTION INTRAMUSCULAR; INTRAVENOUS ONCE
Status: COMPLETED | OUTPATIENT
Start: 2024-02-18 | End: 2024-02-18

## 2024-02-18 RX ORDER — 0.9 % SODIUM CHLORIDE 0.9 %
1000 INTRAVENOUS SOLUTION INTRAVENOUS ONCE
Status: COMPLETED | OUTPATIENT
Start: 2024-02-18 | End: 2024-02-18

## 2024-02-18 RX ORDER — DIPHENHYDRAMINE HYDROCHLORIDE 50 MG/ML
25 INJECTION INTRAMUSCULAR; INTRAVENOUS ONCE
Status: COMPLETED | OUTPATIENT
Start: 2024-02-18 | End: 2024-02-18

## 2024-02-18 RX ORDER — LORAZEPAM 2 MG/ML
1 INJECTION INTRAMUSCULAR ONCE
Status: COMPLETED | OUTPATIENT
Start: 2024-02-18 | End: 2024-02-18

## 2024-02-18 RX ADMIN — METOCLOPRAMIDE HYDROCHLORIDE 10 MG: 5 INJECTION INTRAMUSCULAR; INTRAVENOUS at 11:19

## 2024-02-18 RX ADMIN — DIPHENHYDRAMINE HYDROCHLORIDE 25 MG: 50 INJECTION INTRAMUSCULAR; INTRAVENOUS at 11:19

## 2024-02-18 RX ADMIN — ONDANSETRON 4 MG: 2 INJECTION INTRAMUSCULAR; INTRAVENOUS at 07:44

## 2024-02-18 RX ADMIN — SODIUM CHLORIDE 1000 ML: 9 INJECTION, SOLUTION INTRAVENOUS at 07:44

## 2024-02-18 RX ADMIN — KETOROLAC TROMETHAMINE 30 MG: 30 INJECTION, SOLUTION INTRAMUSCULAR; INTRAVENOUS at 11:19

## 2024-02-18 RX ADMIN — SODIUM CHLORIDE, PRESERVATIVE FREE 20 MG: 5 INJECTION INTRAVENOUS at 07:44

## 2024-02-18 RX ADMIN — LORAZEPAM 1 MG: 2 INJECTION, SOLUTION INTRAMUSCULAR; INTRAVENOUS at 11:19

## 2024-02-18 ASSESSMENT — PAIN SCALES - GENERAL
PAINLEVEL_OUTOF10: 10
PAINLEVEL_OUTOF10: 10

## 2024-02-18 ASSESSMENT — PAIN - FUNCTIONAL ASSESSMENT: PAIN_FUNCTIONAL_ASSESSMENT: 0-10

## 2024-02-18 NOTE — ED PROVIDER NOTES
EMERGENCY DEPARTMENT ENCOUNTER    Pt Name: Scarlet Trevizo  MRN: [id number]  Birthdate 1980  Date of evaluation: 24  CHIEF COMPLAINT       Chief Complaint   Patient presents with    Abdominal Pain     Pt c/o abd pain and nausea and vomiting since yesterday. Pt also states she is having frequent episodes of diarrhea. Pt also c/o urinary frequency.     Nausea & Vomiting    Urinary Frequency    Diarrhea     HISTORY OF PRESENT ILLNESS   The history is provided by the patient and medical records.    The patient is a 43-year-old female who presents to the ED for nausea, vomiting and diarrhea.  Symptoms started yesterday morning.  Now she went to her PCPs who prescribed a probiotic however symptoms have not improved.  No fevers, no abdominal pain.  Patient notes symptoms are chronic, has history of acid reflux.   No reports of hematemesis.  No reports of melena or bright red blood in stool.    REVIEW OF SYSTEMS     Review of Systems  All other systems reviewed and are negative.    PASTMEDICAL HISTORY     Past Medical History:   Diagnosis Date    Cocaine abuse ([facility]) 2023    GERD (gastroesophageal reflux disease)     Intractable nausea and vomiting 10/7/2021    Irritable bowel syndrome without diarrhea 2016    Moderate persistent asthma without complication     Moderate persistent asthma without complication     Seizures ([facility])     2013, 14     Past Problem List  Patient Active Problem List   Diagnosis Code    Mental retardation F79    History of seizure disorder Z86.69    Intractable nausea and vomiting R11.2    Moderate persistent asthma without complication J45.40    Cyclical vomiting R11.15    Cholangitis K83.09    Generalized abdominal pain R10.84    Tetrahydrocannabinol (THC) dependence ([facility]) F12.20    Cocaine abuse ([facility]) F14.10     SURGICAL HISTORY       Past Surgical History:   Procedure Laterality Date     SECTION      2004    TUBAL LIGATION      2004    UPPER GASTROINTESTINAL

## 2024-02-18 NOTE — ED NOTES
Pt presenting to the ED with complains of nausea and vomiting. Pt reports that this has happened in the past. Pt reports diarrhea as well. Pt is A&Ox4.

## 2024-02-18 NOTE — ED NOTES
Pt called out stating she has to use restroom. Pt ambulatory independently to restroom with steady gait. Pt given specimen cup to collect urine sample for testing.

## 2024-03-26 ENCOUNTER — OFFICE VISIT (OUTPATIENT)
Dept: INTERNAL MEDICINE | Age: 44
End: 2024-03-26
Payer: COMMERCIAL

## 2024-03-26 VITALS
OXYGEN SATURATION: 96 % | HEART RATE: 85 BPM | SYSTOLIC BLOOD PRESSURE: 130 MMHG | DIASTOLIC BLOOD PRESSURE: 70 MMHG | TEMPERATURE: 98.2 F

## 2024-03-26 DIAGNOSIS — R11.2 INTRACTABLE NAUSEA AND VOMITING: ICD-10-CM

## 2024-03-26 DIAGNOSIS — G89.29 CHRONIC INTRACTABLE HEADACHE, UNSPECIFIED HEADACHE TYPE: ICD-10-CM

## 2024-03-26 DIAGNOSIS — R35.0 FREQUENT URINATION: Primary | ICD-10-CM

## 2024-03-26 DIAGNOSIS — R51.9 CHRONIC INTRACTABLE HEADACHE, UNSPECIFIED HEADACHE TYPE: ICD-10-CM

## 2024-03-26 DIAGNOSIS — Q61.3 POLYCYSTIC KIDNEY DISEASE: ICD-10-CM

## 2024-03-26 DIAGNOSIS — N39.41 URGE INCONTINENCE: ICD-10-CM

## 2024-03-26 DIAGNOSIS — R56.01 COMPLEX FEBRILE CONVULSION (HCC): ICD-10-CM

## 2024-03-26 DIAGNOSIS — R19.7 DIARRHEA, UNSPECIFIED TYPE: ICD-10-CM

## 2024-03-26 LAB
BILIRUBIN, POC: NORMAL
BLOOD URINE, POC: NORMAL
CLARITY, POC: CLEAR
COLOR, POC: YELLOW
GLUCOSE URINE, POC: NORMAL
KETONES, POC: NORMAL
LEUKOCYTE EST, POC: NORMAL
NITRITE, POC: NORMAL
PH, POC: 6
PROTEIN, POC: NORMAL
SPECIFIC GRAVITY, POC: 1.01
UROBILINOGEN, POC: NORMAL

## 2024-03-26 PROCEDURE — 90677 PCV20 VACCINE IM: CPT

## 2024-03-26 PROCEDURE — 4004F PT TOBACCO SCREEN RCVD TLK: CPT

## 2024-03-26 PROCEDURE — 81002 URINALYSIS NONAUTO W/O SCOPE: CPT

## 2024-03-26 PROCEDURE — 99214 OFFICE O/P EST MOD 30 MIN: CPT

## 2024-03-26 PROCEDURE — G8482 FLU IMMUNIZE ORDER/ADMIN: HCPCS

## 2024-03-26 PROCEDURE — G8427 DOCREV CUR MEDS BY ELIG CLIN: HCPCS

## 2024-03-26 PROCEDURE — G8420 CALC BMI NORM PARAMETERS: HCPCS

## 2024-03-26 RX ORDER — AMOXICILLIN AND CLAVULANATE POTASSIUM 875; 125 MG/1; MG/1
1 TABLET, FILM COATED ORAL 2 TIMES DAILY
COMMUNITY
End: 2024-03-26 | Stop reason: ALTCHOICE

## 2024-03-26 RX ORDER — CEPHALEXIN 250 MG/1
250 CAPSULE ORAL 4 TIMES DAILY
COMMUNITY
End: 2024-03-26 | Stop reason: ALTCHOICE

## 2024-03-26 RX ORDER — LISINOPRIL 10 MG/1
10 TABLET ORAL DAILY
Qty: 90 TABLET | Refills: 1 | Status: SHIPPED | OUTPATIENT
Start: 2024-03-26

## 2024-03-26 RX ORDER — OXYBUTYNIN CHLORIDE 5 MG/1
5 TABLET, EXTENDED RELEASE ORAL DAILY
Qty: 30 TABLET | Refills: 3 | Status: CANCELLED | OUTPATIENT
Start: 2024-03-26

## 2024-03-26 RX ORDER — LEVETIRACETAM 750 MG/1
750 TABLET ORAL 2 TIMES DAILY
Qty: 60 TABLET | Refills: 0 | Status: SHIPPED | OUTPATIENT
Start: 2024-03-26

## 2024-03-26 RX ORDER — LACTOSE-REDUCED FOOD
LIQUID (ML) ORAL
Qty: 30 EACH | Refills: 5 | Status: SHIPPED | OUTPATIENT
Start: 2024-03-26

## 2024-03-26 RX ORDER — ONDANSETRON 4 MG/1
TABLET, FILM COATED ORAL
Qty: 15 TABLET | Refills: 3 | Status: SHIPPED | OUTPATIENT
Start: 2024-03-26

## 2024-03-26 ASSESSMENT — PATIENT HEALTH QUESTIONNAIRE - PHQ9
8. MOVING OR SPEAKING SO SLOWLY THAT OTHER PEOPLE COULD HAVE NOTICED. OR THE OPPOSITE, BEING SO FIGETY OR RESTLESS THAT YOU HAVE BEEN MOVING AROUND A LOT MORE THAN USUAL: NOT AT ALL
5. POOR APPETITE OR OVEREATING: NOT AT ALL
1. LITTLE INTEREST OR PLEASURE IN DOING THINGS: NOT AT ALL
SUM OF ALL RESPONSES TO PHQ QUESTIONS 1-9: 0
SUM OF ALL RESPONSES TO PHQ QUESTIONS 1-9: 0
10. IF YOU CHECKED OFF ANY PROBLEMS, HOW DIFFICULT HAVE THESE PROBLEMS MADE IT FOR YOU TO DO YOUR WORK, TAKE CARE OF THINGS AT HOME, OR GET ALONG WITH OTHER PEOPLE: NOT DIFFICULT AT ALL
9. THOUGHTS THAT YOU WOULD BE BETTER OFF DEAD, OR OF HURTING YOURSELF: NOT AT ALL
6. FEELING BAD ABOUT YOURSELF - OR THAT YOU ARE A FAILURE OR HAVE LET YOURSELF OR YOUR FAMILY DOWN: NOT AT ALL
2. FEELING DOWN, DEPRESSED OR HOPELESS: NOT AT ALL
7. TROUBLE CONCENTRATING ON THINGS, SUCH AS READING THE NEWSPAPER OR WATCHING TELEVISION: NOT AT ALL
SUM OF ALL RESPONSES TO PHQ QUESTIONS 1-9: 0
4. FEELING TIRED OR HAVING LITTLE ENERGY: NOT AT ALL
SUM OF ALL RESPONSES TO PHQ9 QUESTIONS 1 & 2: 0
3. TROUBLE FALLING OR STAYING ASLEEP: NOT AT ALL
SUM OF ALL RESPONSES TO PHQ QUESTIONS 1-9: 0

## 2024-03-26 NOTE — PROGRESS NOTES
Attending Physician Statement  I have discussed the care of Scarlet Trevizo including pertinent history and exam findings,  with the resident. I have reviewed the key elements of all parts of the encounter with the resident.  I agree with the assessment, plan and orders as documented by the resident.  (GE Modifier)    MD ROB Gamboa  Attending Physician, [facility]   Faculty, Internal Medicine Residency Program  [facility]  3/26/2024, 11:06 AM    
Readings from Last 3 Encounters:   03/26/24 130/70   02/18/24 (!) 125/104   12/26/23 123/89         General Examination    General Resting comfortably    Head Normocephalic, without obvious abnormality   Neck Supple, symmetrical. Good ROM. No midline or paraspinal tenderness.    Lungs Respirations unlabored, no wheezing   Chest Wall No deformity   Heart RRR, no murmur   Abdomen Soft. Non-tender, non-distended   Extremities No cyanosis or edema or warmth.   Pulses 2+ and symmetric   Skin: Skin  turgor normal, no rashes or lesions   ________________________________________________________________________  Diagnostic findings:  CBC:  Lab Results   Component Value Date/Time    WBC 10.0 02/18/2024 07:45 AM    HGB 15.6 02/18/2024 07:45 AM     02/18/2024 07:45 AM     03/21/2012 12:39 PM       BMP:    Lab Results   Component Value Date/Time     02/18/2024 07:45 AM    K 4.2 02/18/2024 07:45 AM     02/18/2024 07:45 AM    CO2 21 02/18/2024 07:45 AM    BUN 17 02/18/2024 07:45 AM    CREATININE 0.7 02/18/2024 07:45 AM    GLUCOSE 99 02/18/2024 07:45 AM    GLUCOSE 118 03/21/2012 12:39 PM       HEMOGLOBIN A1C:   Lab Results   Component Value Date/Time    LABA1C 4.6 06/05/2023 12:21 PM       FASTING LIPID PANEL:  Lab Results   Component Value Date    CHOL 210 (H) 06/11/2020    HDL 91 06/11/2020    TRIG 109 06/11/2020     ________________________________________________________________________  Health Maintenance:  Health Maintenance Due   Topic Date Due    Hepatitis B vaccine (1 of 3 - 3-dose series) Never done    Cervical cancer screen  02/05/2021    COVID-19 Vaccine (3 - 2023-24 season) 09/01/2023    Annual Wellness Visit (Medicare Advantage)  Never done    Depression Screen  01/24/2024     ________________________________________________________________________  Assessment and Plan:    1. Complex convulsion (HCC)  - Follows with neurology  - levETIRAcetam (KEPPRA) 750 MG tablet; Take 1 tablet by mouth 2

## 2024-04-23 ENCOUNTER — HOSPITAL ENCOUNTER (OUTPATIENT)
Dept: NUCLEAR MEDICINE | Age: 44
Discharge: HOME | End: 2024-04-25
Payer: COMMERCIAL

## 2024-04-23 ENCOUNTER — HOSPITAL ENCOUNTER (OUTPATIENT)
Dept: MRI IMAGING | Age: 44
Discharge: HOME | End: 2024-04-25
Attending: INTERNAL MEDICINE
Payer: COMMERCIAL

## 2024-04-23 DIAGNOSIS — R11.2 INTRACTABLE NAUSEA AND VOMITING: ICD-10-CM

## 2024-04-23 DIAGNOSIS — Q61.3 POLYCYSTIC KIDNEY DISEASE: ICD-10-CM

## 2024-04-23 PROCEDURE — 78264 GASTRIC EMPTYING IMG STUDY: CPT

## 2024-04-23 PROCEDURE — 6360000004 HC RX CONTRAST MEDICATION

## 2024-04-23 PROCEDURE — A9541 TC99M SULFUR COLLOID: HCPCS | Performed by: INTERNAL MEDICINE

## 2024-04-23 PROCEDURE — 3430000000 HC RX DIAGNOSTIC RADIOPHARMACEUTICAL: Performed by: INTERNAL MEDICINE

## 2024-04-23 PROCEDURE — 70546 MR ANGIOGRAPH HEAD W/O&W/DYE: CPT

## 2024-04-23 PROCEDURE — A9579 GAD-BASE MR CONTRAST NOS,1ML: HCPCS

## 2024-04-23 RX ADMIN — GADOTERIDOL 10 ML: 279.3 INJECTION, SOLUTION INTRAVENOUS at 11:11

## 2024-04-23 RX ADMIN — Medication 2.9 MILLICURIE: at 07:47

## 2024-05-02 DIAGNOSIS — R56.01 COMPLEX FEBRILE CONVULSION (HCC): ICD-10-CM

## 2024-05-02 DIAGNOSIS — R11.2 INTRACTABLE NAUSEA AND VOMITING: ICD-10-CM

## 2024-05-02 DIAGNOSIS — R19.7 DIARRHEA, UNSPECIFIED TYPE: ICD-10-CM

## 2024-05-02 NOTE — TELEPHONE ENCOUNTER
Scarlet Trevizo is calling to request a refill on the following medication(s):    Medication Request:  Requested Prescriptions     Pending Prescriptions Disp Refills    levETIRAcetam (KEPPRA) 750 MG tablet [Pharmacy Med Name: levETIRAcetam 750 MG TABLET] 60 tablet 0     Sig: TAKE 1 TABLET BY MOUTH 2 TIMES A DAY       Last Visit Date (If Applicable):  3/26/2024    Next Visit Date:    6/18/2024

## 2024-05-02 NOTE — TELEPHONE ENCOUNTER
Scarlet Trevizo is calling to request a refill on the following medication(s):    Medication Request:  Requested Prescriptions     Pending Prescriptions Disp Refills    Multiple Vitamin (DAILY-SERGEY MULTIVITAMIN) TABS [Pharmacy Med Name: DAILY-SERGEY TABLET] 30 tablet 3     Sig: TAKE 1 TABLET BY MOUTH DAILY       Last Visit Date (If Applicable):  3/26/2024    Next Visit Date:    5/2/2024

## 2024-05-03 RX ORDER — LEVETIRACETAM 750 MG/1
750 TABLET ORAL 2 TIMES DAILY
Qty: 60 TABLET | Refills: 0 | Status: SHIPPED | OUTPATIENT
Start: 2024-05-03

## 2024-05-03 RX ORDER — MULTIVITAMIN WITH FOLIC ACID 400 MCG
1 TABLET ORAL DAILY
Qty: 30 TABLET | Refills: 3 | Status: SHIPPED | OUTPATIENT
Start: 2024-05-03

## 2024-05-23 DIAGNOSIS — M54.2 NECK PAIN ON LEFT SIDE: ICD-10-CM

## 2024-05-23 RX ORDER — ACETAMINOPHEN 500 MG
TABLET ORAL
Qty: 120 TABLET | Refills: 2 | Status: SHIPPED | OUTPATIENT
Start: 2024-05-23

## 2024-05-23 NOTE — TELEPHONE ENCOUNTER
..Request for   Requested Prescriptions     Pending Prescriptions Disp Refills    acetaminophen (TYLENOL) 500 MG tablet 120 tablet 2     Sig: TAKE 1 TABLET BY MOUTH 4 TIMES A DAY AS NEEDED FOR PAIN    .      Please review and e-scribe to pharmacy listed in chart if appropriate. Thank you.      Last Visit Date: 3/26/2024  Next Visit Date: 6/18/2024    Future Appointments   Date Time Provider Department Center   6/18/2024 10:00 AM Esteban Manrique MD [facility]       Health Maintenance   Topic Date Due    Hepatitis B vaccine (1 of 3 - 3-dose series) Never done    Cervical cancer screen  02/05/2021    COVID-19 Vaccine (3 - 2023-24 season) 09/01/2023    Annual Wellness Visit (Medicare Advantage)  Never done    Depression Screen  03/26/2025    Lipids  06/11/2025    DTaP/Tdap/Td vaccine (2 - Td or Tdap) 09/13/2026    Flu vaccine  Completed    Pneumococcal 0-64 years Vaccine  Completed    Hepatitis C screen  Completed    HIV screen  Completed    Hepatitis A vaccine  Aged Out    Hib vaccine  Aged Out    HPV vaccine  Aged Out    Polio vaccine  Aged Out    Meningococcal (ACWY) vaccine  Aged Out    Varicella vaccine  Discontinued       Hemoglobin A1C (%)   Date Value   06/05/2023 4.6             ( goal A1C is < 7)   No components found for: \"LABMICR\"  No components found for: \"LDLCHOLESTEROL\", \"LDLCALC\"    (goal LDL is <100)   AST (U/L)   Date Value   02/18/2024 19     ALT (U/L)   Date Value   02/18/2024 15     BUN (mg/dL)   Date Value   02/18/2024 17     BP Readings from Last 3 Encounters:   03/26/24 130/70   02/18/24 (!) 125/104   12/26/23 123/89          (goal 120/80)    All Future Testing planned in [facility]  Lab Frequency Next Occurrence         Patient Active Problem List:     Mental retardation     History of seizure disorder     Intractable nausea and vomiting     Moderate persistent asthma without complication     Cyclical vomiting     Cholangitis     Generalized abdominal pain     Tetrahydrocannabinol (THC)

## 2024-05-30 DIAGNOSIS — R05.3 CHRONIC COUGH: ICD-10-CM

## 2024-05-30 DIAGNOSIS — J45.20 MILD INTERMITTENT REACTIVE AIRWAY DISEASE WITHOUT COMPLICATION: ICD-10-CM

## 2024-05-30 RX ORDER — ALBUTEROL SULFATE 90 UG/1
2 AEROSOL, METERED RESPIRATORY (INHALATION) EVERY 6 HOURS PRN
Qty: 8.5 G | Refills: 3 | Status: SHIPPED | OUTPATIENT
Start: 2024-05-30

## 2024-05-30 NOTE — TELEPHONE ENCOUNTER
Scarlet Trevizo is calling to request a refill on the following medication(s):    Medication Request:  Requested Prescriptions     Pending Prescriptions Disp Refills    albuterol sulfate HFA (PROVENTIL;VENTOLIN;PROAIR) 108 (90 Base) MCG/ACT inhaler [Pharmacy Med Name: ALBUTEROL HFA 90 MCG INHALER] 8.5 g 3     Sig: INHALE 2 PUFFS BY MOUTH EVERY 6 HOURS AS NEEDED FOR WHEEZING       Last Visit Date (If Applicable):  3/26/2024    Next Visit Date:    6/18/2024

## 2024-06-03 ENCOUNTER — TELEPHONE (OUTPATIENT)
Dept: INTERNAL MEDICINE | Age: 44
End: 2024-06-03

## 2024-06-03 DIAGNOSIS — J45.20 MILD INTERMITTENT REACTIVE AIRWAY DISEASE WITHOUT COMPLICATION: Primary | ICD-10-CM

## 2024-06-03 RX ORDER — FLUTICASONE PROPIONATE 220 UG/1
2 AEROSOL, METERED RESPIRATORY (INHALATION) 2 TIMES DAILY
Qty: 12 G | Refills: 3 | Status: CANCELLED | OUTPATIENT
Start: 2024-06-03

## 2024-06-03 NOTE — TELEPHONE ENCOUNTER
Received request for PA in Epic for Flovent HFA.    Per chart review, medication hasn't had refill sent since 12/26/23, unclear why PA is populating. Per dispense report, medication has not been dispensed since 1/21/24.    Flovent appears to be formulary for pt's insurance.

## 2024-06-04 NOTE — TELEPHONE ENCOUNTER
Someone completed PA, subsequent denial.    Denied. This drug is not covered on the formulary. We are denying your request because we do not show that you have tried at least 2 covered drugs that can treat your condition. Other covered drug(s) is/are: Arnuity Ellipta inhalation blister with device 50mcg/actuation, 100mcg/actuation, 200mcg/actuation, Pulmicort Flexhaler inhalation breath activated 90mcg/actuation, 180mcg/actuation. We may be able to make an exception to cover this drug. Your doctor will need to send us medical records showing that you tried this drug. If you cannot take the covered drug, your doctor will need to tell us why. Note: Some covered drug(s) may have quantity limits. Please refer to the formulary for details.     Script for Arnuity pended, please review and e-scribe if appropriate.

## 2024-06-05 RX ORDER — FLUTICASONE FUROATE 200 UG/1
1 POWDER RESPIRATORY (INHALATION) DAILY
Qty: 30 EACH | Refills: 5 | Status: SHIPPED | OUTPATIENT
Start: 2024-06-05

## 2024-06-18 ENCOUNTER — OFFICE VISIT (OUTPATIENT)
Dept: INTERNAL MEDICINE | Age: 44
End: 2024-06-18
Payer: COMMERCIAL

## 2024-06-18 ENCOUNTER — TELEPHONE (OUTPATIENT)
Dept: INTERNAL MEDICINE | Age: 44
End: 2024-06-18

## 2024-06-18 VITALS
DIASTOLIC BLOOD PRESSURE: 64 MMHG | TEMPERATURE: 97.9 F | SYSTOLIC BLOOD PRESSURE: 113 MMHG | BODY MASS INDEX: 19.73 KG/M2 | HEART RATE: 69 BPM | OXYGEN SATURATION: 98 % | WEIGHT: 107.2 LBS | HEIGHT: 62 IN

## 2024-06-18 DIAGNOSIS — R11.2 INTRACTABLE NAUSEA AND VOMITING: ICD-10-CM

## 2024-06-18 DIAGNOSIS — Q61.3 POLYCYSTIC KIDNEY DISEASE: ICD-10-CM

## 2024-06-18 DIAGNOSIS — R19.7 DIARRHEA, UNSPECIFIED TYPE: ICD-10-CM

## 2024-06-18 DIAGNOSIS — Z12.31 ENCOUNTER FOR SCREENING MAMMOGRAM FOR MALIGNANT NEOPLASM OF BREAST: ICD-10-CM

## 2024-06-18 DIAGNOSIS — R11.15 CYCLICAL VOMITING: Primary | ICD-10-CM

## 2024-06-18 DIAGNOSIS — K21.9 GASTROESOPHAGEAL REFLUX DISEASE WITHOUT ESOPHAGITIS: ICD-10-CM

## 2024-06-18 DIAGNOSIS — E44.1 MILD PROTEIN-CALORIE MALNUTRITION (HCC): ICD-10-CM

## 2024-06-18 DIAGNOSIS — J45.20 MILD INTERMITTENT REACTIVE AIRWAY DISEASE WITHOUT COMPLICATION: ICD-10-CM

## 2024-06-18 DIAGNOSIS — M54.2 NECK PAIN ON LEFT SIDE: ICD-10-CM

## 2024-06-18 DIAGNOSIS — Z00.00 ROUTINE ADULT HEALTH MAINTENANCE: ICD-10-CM

## 2024-06-18 DIAGNOSIS — Z86.69 HISTORY OF SEIZURE DISORDER: ICD-10-CM

## 2024-06-18 PROCEDURE — G8427 DOCREV CUR MEDS BY ELIG CLIN: HCPCS

## 2024-06-18 PROCEDURE — 99213 OFFICE O/P EST LOW 20 MIN: CPT

## 2024-06-18 PROCEDURE — 4004F PT TOBACCO SCREEN RCVD TLK: CPT

## 2024-06-18 PROCEDURE — G8420 CALC BMI NORM PARAMETERS: HCPCS

## 2024-06-18 PROCEDURE — 99211 OFF/OP EST MAY X REQ PHY/QHP: CPT | Performed by: STUDENT IN AN ORGANIZED HEALTH CARE EDUCATION/TRAINING PROGRAM

## 2024-06-18 RX ORDER — LISINOPRIL 10 MG/1
10 TABLET ORAL DAILY
Qty: 90 TABLET | Refills: 1 | Status: SHIPPED | OUTPATIENT
Start: 2024-06-18

## 2024-06-18 RX ORDER — FLUTICASONE FUROATE 200 UG/1
1 POWDER RESPIRATORY (INHALATION) DAILY
Qty: 30 EACH | Refills: 5 | Status: SHIPPED | OUTPATIENT
Start: 2024-06-18

## 2024-06-18 RX ORDER — MULTIVITAMIN WITH FOLIC ACID 400 MCG
1 TABLET ORAL DAILY
Qty: 30 TABLET | Refills: 3 | Status: SHIPPED | OUTPATIENT
Start: 2024-06-18

## 2024-06-18 RX ORDER — LACTOSE-REDUCED FOOD
LIQUID (ML) ORAL
Qty: 30 EACH | Refills: 5 | Status: SHIPPED | OUTPATIENT
Start: 2024-06-18

## 2024-06-18 RX ORDER — LACTOSE-REDUCED FOOD
LIQUID (ML) ORAL
Qty: 30 EACH | Refills: 5 | Status: CANCELLED | OUTPATIENT
Start: 2024-06-18

## 2024-06-18 RX ORDER — ALBUTEROL SULFATE 90 UG/1
2 AEROSOL, METERED RESPIRATORY (INHALATION) EVERY 6 HOURS PRN
Qty: 8.5 G | Refills: 3 | Status: SHIPPED | OUTPATIENT
Start: 2024-06-18

## 2024-06-18 RX ORDER — ACETAMINOPHEN 500 MG
TABLET ORAL
Qty: 120 TABLET | Refills: 2 | Status: SHIPPED | OUTPATIENT
Start: 2024-06-18

## 2024-06-18 RX ORDER — ONDANSETRON 4 MG/1
TABLET, FILM COATED ORAL
Qty: 15 TABLET | Refills: 1 | Status: SHIPPED | OUTPATIENT
Start: 2024-06-18

## 2024-06-18 RX ORDER — LEVETIRACETAM 750 MG/1
750 TABLET ORAL 2 TIMES DAILY
Qty: 60 TABLET | Refills: 0 | Status: SHIPPED | OUTPATIENT
Start: 2024-06-18

## 2024-06-18 RX ORDER — PANTOPRAZOLE SODIUM 40 MG/1
40 TABLET, DELAYED RELEASE ORAL
Qty: 90 TABLET | Refills: 1 | Status: SHIPPED | OUTPATIENT
Start: 2024-06-18

## 2024-06-18 SDOH — ECONOMIC STABILITY: FOOD INSECURITY: WITHIN THE PAST 12 MONTHS, YOU WORRIED THAT YOUR FOOD WOULD RUN OUT BEFORE YOU GOT MONEY TO BUY MORE.: OFTEN TRUE

## 2024-06-18 SDOH — ECONOMIC STABILITY: FOOD INSECURITY: WITHIN THE PAST 12 MONTHS, THE FOOD YOU BOUGHT JUST DIDN'T LAST AND YOU DIDN'T HAVE MONEY TO GET MORE.: OFTEN TRUE

## 2024-06-18 SDOH — ECONOMIC STABILITY: INCOME INSECURITY: HOW HARD IS IT FOR YOU TO PAY FOR THE VERY BASICS LIKE FOOD, HOUSING, MEDICAL CARE, AND HEATING?: NOT HARD AT ALL

## 2024-06-18 ASSESSMENT — PATIENT HEALTH QUESTIONNAIRE - PHQ9
SUM OF ALL RESPONSES TO PHQ QUESTIONS 1-9: 10
SUM OF ALL RESPONSES TO PHQ QUESTIONS 1-9: 10
4. FEELING TIRED OR HAVING LITTLE ENERGY: NEARLY EVERY DAY
6. FEELING BAD ABOUT YOURSELF - OR THAT YOU ARE A FAILURE OR HAVE LET YOURSELF OR YOUR FAMILY DOWN: NOT AT ALL
1. LITTLE INTEREST OR PLEASURE IN DOING THINGS: NEARLY EVERY DAY
8. MOVING OR SPEAKING SO SLOWLY THAT OTHER PEOPLE COULD HAVE NOTICED. OR THE OPPOSITE, BEING SO FIGETY OR RESTLESS THAT YOU HAVE BEEN MOVING AROUND A LOT MORE THAN USUAL: NOT AT ALL
SUM OF ALL RESPONSES TO PHQ9 QUESTIONS 1 & 2: 3
10. IF YOU CHECKED OFF ANY PROBLEMS, HOW DIFFICULT HAVE THESE PROBLEMS MADE IT FOR YOU TO DO YOUR WORK, TAKE CARE OF THINGS AT HOME, OR GET ALONG WITH OTHER PEOPLE: NOT DIFFICULT AT ALL
SUM OF ALL RESPONSES TO PHQ QUESTIONS 1-9: 10
3. TROUBLE FALLING OR STAYING ASLEEP: NEARLY EVERY DAY
SUM OF ALL RESPONSES TO PHQ QUESTIONS 1-9: 10
2. FEELING DOWN, DEPRESSED OR HOPELESS: NOT AT ALL
5. POOR APPETITE OR OVEREATING: SEVERAL DAYS
7. TROUBLE CONCENTRATING ON THINGS, SUCH AS READING THE NEWSPAPER OR WATCHING TELEVISION: NOT AT ALL
9. THOUGHTS THAT YOU WOULD BE BETTER OFF DEAD, OR OF HURTING YOURSELF: NOT AT ALL

## 2024-06-18 NOTE — PROGRESS NOTES
Scarlet Trevizo was evaluated today and a DME order was entered for Ensure dietary supplements in order to given due to the diagnosis of protein energy malnutrition.  The need for this supplement and treatment was discussed with the patient and she understands and is in agreement.          Esteban Manrique MD  Internal medicine resident  [facility]  6/18/2024

## 2024-06-18 NOTE — PROGRESS NOTES
[facility]  [facility] [place] [id number]  [facility] [address]  Dept: [phone]  Dept Fax: [phone]    Office Progress/Follow Up Note  Date of patient's visit: 6/18/2024  Patient's Name:  Scarlet Trevizo YOB: 1980            Patient Care Team:  Esteban Manrique MD as PCP - General (Internal Medicine)  Dom Beasley MD as Consulting Physician (Internal Medicine)  ________________________________________________________________________      Reason for Visit: Routine outpatient follow up  ________________________________________________________________________  Chief Complaint:  Asthma (Pt took medication today), Results (From 4/23/24), and Nausea (Every morning pt feeling sick)    ________________________________________________________________________  History of Presenting Illness:  History was obtained from: patient, electronic medical record. Scralet Trevizo is a 44 y.o. is here for:    Patient is here for regular follow-up.  Underwent MRI which was negative for aneurysm.  Was done due to the fact that patient has autosomal dominant polycystic kidney disease and complains of morning headache nausea and vomiting.  For polycystic kidney disease patient was given referral to polycystic kidney disease nephrology specialist at [facility].  However patient did not go and she is asking for a reprint of referral/to call to schedule an appointment.  Underwent gastric emptying study which showed mild delay.  Patient is still complaining of intermittent nausea and vomiting.  Underwent EGD on 5/2023 which showed moderate gastritis at that time.  On PPI.  For hypertension blood pressure today is 113/64 heart rate.  On lisinopril 10 mg.  Denies illicit drug use, smoking or drinking recently.  Patient was previously started on Keppra 750 mg.  Did not see neurology recently.  For asthma patient is taking albuterol and Arnuity Ellipta.  Mildly incision improved

## 2024-06-18 NOTE — PATIENT INSTRUCTIONS
Tuesday and Thursday 1:30-3pm.  Phone and Address: [phone]; [address] [facility] [address]  [facility] [employer] Mobile [employer] at [facility]  What they offer: Hot meal: Second and fourth Saturday from 12:45-1:15pm; Food pantry: Wednesday 6pm - 7pm (seasonal hours), cleaning supplies every second Wednesday, hygiene items every fourth Wednesday.  Phone and Address: [phone]; [address] [facility] [address]  [facility] [employer] [place]  What they offer: Choice food pantry and household items Tuesday through Friday 9am-3pm appointment only (scheduled for the following week)  Phone and Address: Call [phone]; [address] [facility] [address]  [facility] [employer]  What they offer: Tuesday and Thursday 10am - 12pm, must register by phone  Phone and Address:  Call to register [phone]; [address] [facility] [address]  [employer]  What they offer: Food pantry: Monday-Friday 9:30-11am, Hot meal: Monday-Thursday 12-1:30pm  Phone Number: [phone]; [address] [facility] [address]  [facility] [place], [place]:  What they offer: Food Pantry (Appointment Only, [facility] residents)  Phone Number: [phone]  [place] [employer] (ODF):  What they offer: Government programs including Medicaid, SNAP (food stamps), TANF (cash assistance), and childcare assistance.  Phone Number: [phone]      [facility]  What they offer: Food pantry, Monday-Thursday 9am-12pm, [facility] residents with picture ID  Phone and Address: [phone]; [address] [facility] [address]    [facility]  What they offer: Food pantry delivery within [place] boundaries  Phone number: [phone]  [employer]  What they offer: Free referral service available by phone to anyone in

## 2024-06-18 NOTE — PROGRESS NOTES
Attending Physician Statement  I have discussed the care of Scarlet Trevizo, including pertinent history and exam findings with the resident. I have reviewed the key elements of all parts of the encounter with the resident.  I agree with the assessment, and status of the problem list as documented. The plan and orders should include   Orders Placed This Encounter   Procedures    [facility] DIGITAL SCREEN W OR WO CAD BILATERAL    DME Order for (Specify) as OP    and this was also documented by the resident.The medication list was reviewed with the resident and is up to date.     Diagnosis Orders   1. Cyclical vomiting        2. Gastroesophageal reflux disease without esophagitis  pantoprazole (PROTONIX) 40 MG tablet      3. Intractable nausea and vomiting  ondansetron (ZOFRAN) 4 MG tablet    Nutritional Supplements (ENSURE ACTIVE HIGH PROTEIN) LIQD    Multiple Vitamin (DAILY-SERGEY MULTIVITAMIN) TABS      4. Diarrhea, unspecified type  Nutritional Supplements (ENSURE ACTIVE HIGH PROTEIN) LIQD    Multiple Vitamin (DAILY-SERGEY MULTIVITAMIN) TABS      5. Polycystic kidney disease  lisinopril (PRINIVIL;ZESTRIL) 10 MG tablet      6. Neck pain on left side  acetaminophen (TYLENOL) 500 MG tablet      7. Mild intermittent reactive airway disease without complication  albuterol sulfate HFA (PROVENTIL;VENTOLIN;PROAIR) 108 (90 Base) MCG/ACT inhaler    fluticasone (ARNUITY ELLIPTA) 200 MCG/ACT AEPB      8. History of seizure disorder        9. Routine adult health maintenance        10. Encounter for screening mammogram for malignant neoplasm of breast  [facility] DIGITAL SCREEN W OR WO CAD BILATERAL      11. Mild protein-calorie malnutrition (HCC)  DME Order for (Specify) as OP           Ingrid Madrigal MD   Attending Physician, [facility]   Faculty, Internal Medicine Residency Program  [facility]

## 2024-06-20 DIAGNOSIS — J45.20 MILD INTERMITTENT REACTIVE AIRWAY DISEASE WITHOUT COMPLICATION: ICD-10-CM

## 2024-06-20 RX ORDER — FLUTICASONE FUROATE 200 UG/1
1 POWDER RESPIRATORY (INHALATION) DAILY
Qty: 30 EACH | Refills: 5 | Status: SHIPPED | OUTPATIENT
Start: 2024-06-20

## 2024-06-20 NOTE — TELEPHONE ENCOUNTER
Order, last notes, demographics printed and faxed to [facility] per insurance constraints on DME company use.    PC to pt to notify on where order being sent, no answer. Left HIPAA compliant message identifying self and nature of call, requested call back to writer, phone number given.

## 2024-06-20 NOTE — TELEPHONE ENCOUNTER
Received request for PA in/on [email or web address] for fluticasone propionate.    Per fax, preferred medications that do not require a PA are:    Arnuity Ellipta  Fluticasone propionate Diskus  Pulmicort Flexhaler    Review of chart shows Arnuity Ellipta was ordered. Pt needs TIFFANIE script for Arnuity to prevent unnecessary PA's from populating.

## 2024-07-17 RX ORDER — LEVETIRACETAM 750 MG/1
750 TABLET ORAL 2 TIMES DAILY
Qty: 60 TABLET | Refills: 0 | Status: SHIPPED | OUTPATIENT
Start: 2024-07-17

## 2024-07-17 NOTE — TELEPHONE ENCOUNTER
Scarlet Trevizo is calling to request a refill on the following medication(s):    Medication Request:  Requested Prescriptions     Pending Prescriptions Disp Refills    levETIRAcetam (KEPPRA) 750 MG tablet [Pharmacy Med Name: levETIRAcetam 750 MG TABLET] 60 tablet 0     Sig: TAKE 1 TABLET BY MOUTH 2 TIMES A DAY       Last Visit Date (If Applicable):  6/18/2024    Next Visit Date:    Visit date not found

## 2024-08-12 NOTE — TELEPHONE ENCOUNTER
Scarlet Trevizo is calling to request a refill on the following medication(s):    Medication Request:  Requested Prescriptions     Pending Prescriptions Disp Refills    levETIRAcetam (KEPPRA) 750 MG tablet 60 tablet 1     Sig: Take 1 tablet by mouth 2 times daily       Last Visit Date (If Applicable):  6/18/2024    Next Visit Date:    Visit date not found

## 2024-08-13 RX ORDER — LEVETIRACETAM 750 MG/1
750 TABLET ORAL 2 TIMES DAILY
Qty: 60 TABLET | Refills: 1 | Status: SHIPPED | OUTPATIENT
Start: 2024-08-13

## 2024-10-07 RX ORDER — LEVETIRACETAM 750 MG/1
750 TABLET ORAL 2 TIMES DAILY
Qty: 60 TABLET | Refills: 1 | Status: SHIPPED | OUTPATIENT
Start: 2024-10-07

## 2024-10-07 NOTE — TELEPHONE ENCOUNTER
Scarlet Trevizo is calling to request a refill on the following medication(s):    Medication Request:  Requested Prescriptions     Pending Prescriptions Disp Refills    levETIRAcetam (KEPPRA) 750 MG tablet [Pharmacy Med Name: levETIRAcetam 750 MG TABLET] 60 tablet 1     Sig: TAKE 1 TABLET BY MOUTH 2 TIMES A DAY       Last Visit Date (If Applicable):  6/18/2024    Next Visit Date:    10/15/2024

## 2024-10-12 ENCOUNTER — HOSPITAL ENCOUNTER (EMERGENCY)
Age: 44
Discharge: HOME | End: 2024-10-12
Attending: EMERGENCY MEDICINE
Payer: COMMERCIAL

## 2024-10-12 VITALS
SYSTOLIC BLOOD PRESSURE: 114 MMHG | DIASTOLIC BLOOD PRESSURE: 86 MMHG | RESPIRATION RATE: 14 BRPM | HEART RATE: 101 BPM | HEIGHT: 62 IN | BODY MASS INDEX: 19.88 KG/M2 | OXYGEN SATURATION: 97 % | TEMPERATURE: 98.1 F | WEIGHT: 108 LBS

## 2024-10-12 DIAGNOSIS — R11.15 CYCLICAL VOMITING: Primary | ICD-10-CM

## 2024-10-12 PROCEDURE — 99284 EMERGENCY DEPT VISIT MOD MDM: CPT

## 2024-10-12 PROCEDURE — 6370000000 HC RX 637 (ALT 250 FOR IP): Performed by: EMERGENCY MEDICINE

## 2024-10-12 PROCEDURE — 6360000002 HC RX W HCPCS: Performed by: EMERGENCY MEDICINE

## 2024-10-12 PROCEDURE — 96372 THER/PROPH/DIAG INJ SC/IM: CPT

## 2024-10-12 RX ORDER — LORAZEPAM 1 MG/1
1 TABLET ORAL ONCE
Status: COMPLETED | OUTPATIENT
Start: 2024-10-12 | End: 2024-10-12

## 2024-10-12 RX ORDER — ONDANSETRON 4 MG/1
4 TABLET, ORALLY DISINTEGRATING ORAL ONCE
Status: COMPLETED | OUTPATIENT
Start: 2024-10-12 | End: 2024-10-12

## 2024-10-12 RX ORDER — KETOROLAC TROMETHAMINE 30 MG/ML
30 INJECTION, SOLUTION INTRAMUSCULAR; INTRAVENOUS ONCE
Status: COMPLETED | OUTPATIENT
Start: 2024-10-12 | End: 2024-10-12

## 2024-10-12 RX ORDER — METOCLOPRAMIDE HYDROCHLORIDE 5 MG/ML
10 INJECTION INTRAMUSCULAR; INTRAVENOUS ONCE
Status: COMPLETED | OUTPATIENT
Start: 2024-10-12 | End: 2024-10-12

## 2024-10-12 RX ADMIN — METOCLOPRAMIDE HYDROCHLORIDE 10 MG: 5 INJECTION INTRAMUSCULAR; INTRAVENOUS at 08:14

## 2024-10-12 RX ADMIN — LORAZEPAM 1 MG: 1 TABLET ORAL at 08:14

## 2024-10-12 RX ADMIN — KETOROLAC TROMETHAMINE 30 MG: 30 INJECTION, SOLUTION INTRAMUSCULAR at 08:14

## 2024-10-12 RX ADMIN — ONDANSETRON 4 MG: 4 TABLET, ORALLY DISINTEGRATING ORAL at 08:14

## 2024-10-12 ASSESSMENT — PAIN - FUNCTIONAL ASSESSMENT: PAIN_FUNCTIONAL_ASSESSMENT: 0-10

## 2024-10-12 ASSESSMENT — PAIN SCALES - GENERAL: PAINLEVEL_OUTOF10: 9

## 2024-10-12 NOTE — ED NOTES
Pt presents to ER from home due to abdominal pain and Emesis. Pt denies exposure to COVID or the flu but states she is unable to keep food or drink down.Pt is A/O x 4, equal chest expansion with non labored breathing, wheels locked, bed in lowest position, call light in reach.

## 2024-10-12 NOTE — ED PROVIDER NOTES
EMERGENCY DEPARTMENT ENCOUNTER    Pt Name: Scarlet Trevizo  MRN: [id number]  Birthdate 1980  Date of evaluation: 10/12/24  CHIEF COMPLAINT       Chief Complaint   Patient presents with    Emesis     Reports vomiting since Wednesday. Unable to tolerate PO fluids.     Abdominal Pain     HISTORY OF PRESENT ILLNESS   The history is provided by the patient and medical records.    The patient is a 44-year-old female with history of cocaine abuse, IBS, GERD, asthma, seizures, cyclic vomiting syndrome, and THC dependency who presents to the ED for nausea, vomiting and achy abdominal pain that started 4 days.  She reports last time she used THC was in September.    REVIEW OF SYSTEMS     Review of Systems  All other systems reviewed and are negative.    PASTMEDICAL HISTORY     Past Medical History:   Diagnosis Date    Cocaine abuse ([facility]) 2023    GERD (gastroesophageal reflux disease)     Intractable nausea and vomiting 10/7/2021    Irritable bowel syndrome without diarrhea 2016    Moderate persistent asthma without complication     Moderate persistent asthma without complication     Seizures ([facility])     2013, 14     Past Problem List  Patient Active Problem List   Diagnosis Code    Mental retardation F79    History of seizure disorder Z86.69    Intractable nausea and vomiting R11.2    Moderate persistent asthma without complication J45.40    Cyclical vomiting R11.15    Cholangitis K83.09    Generalized abdominal pain R10.84    Tetrahydrocannabinol (THC) dependence ([facility]) F12.20    Cocaine abuse ([facility]) F14.10     SURGICAL HISTORY       Past Surgical History:   Procedure Laterality Date     SECTION          TUBAL LIGATION          UPPER GASTROINTESTINAL ENDOSCOPY N/A 10/9/2021    EGD BIOPSY performed by Katiuska Kingston MD at [facility] OR    UPPER GASTROINTESTINAL ENDOSCOPY N/A 5/10/2023    EGD BIOPSY performed by Katiuska Kingston MD at [facility] OR     CURRENT MEDICATIONS       Discharge Medication List as of  Medication List as of 10/12/2024 10:16 AM        PHYSICIAN CONSULTS ORDERED THIS ENCOUNTER:  None  FINAL IMPRESSION      1. Cyclical vomiting          DISPOSITION/PLAN   DISPOSITION Decision To Discharge 10/12/2024 10:16:08 AM  Condition at Disposition: Data Unavailable      OUTPATIENT FOLLOW UP THE PATIENT:  Esteban Manrique MD  [facility], [facility] [address]  [phone]    In 2 days        MD Charleen Henriquez Joseph R, MD  10/13/24 [id number]

## 2024-10-13 ENCOUNTER — HOSPITAL ENCOUNTER (EMERGENCY)
Age: 44
Discharge: HOME | End: 2024-10-13
Attending: EMERGENCY MEDICINE
Payer: COMMERCIAL

## 2024-10-13 VITALS
DIASTOLIC BLOOD PRESSURE: 100 MMHG | HEART RATE: 95 BPM | TEMPERATURE: 97.6 F | SYSTOLIC BLOOD PRESSURE: 115 MMHG | OXYGEN SATURATION: 95 % | RESPIRATION RATE: 18 BRPM

## 2024-10-13 DIAGNOSIS — R11.15 CYCLICAL VOMITING: Primary | ICD-10-CM

## 2024-10-13 PROCEDURE — 96372 THER/PROPH/DIAG INJ SC/IM: CPT

## 2024-10-13 PROCEDURE — 6370000000 HC RX 637 (ALT 250 FOR IP): Performed by: EMERGENCY MEDICINE

## 2024-10-13 PROCEDURE — 99284 EMERGENCY DEPT VISIT MOD MDM: CPT

## 2024-10-13 PROCEDURE — 6360000002 HC RX W HCPCS: Performed by: EMERGENCY MEDICINE

## 2024-10-13 RX ORDER — DIPHENHYDRAMINE HYDROCHLORIDE 50 MG/ML
25 INJECTION INTRAMUSCULAR; INTRAVENOUS ONCE
Status: COMPLETED | OUTPATIENT
Start: 2024-10-13 | End: 2024-10-13

## 2024-10-13 RX ORDER — MIDAZOLAM HYDROCHLORIDE 1 MG/ML
4 INJECTION INTRAMUSCULAR; INTRAVENOUS ONCE
Status: COMPLETED | OUTPATIENT
Start: 2024-10-13 | End: 2024-10-13

## 2024-10-13 RX ORDER — ONDANSETRON 4 MG/1
4 TABLET, ORALLY DISINTEGRATING ORAL EVERY 8 HOURS PRN
Status: DISCONTINUED | OUTPATIENT
Start: 2024-10-13 | End: 2024-10-13 | Stop reason: HOSPADM

## 2024-10-13 RX ORDER — MAGNESIUM HYDROXIDE/ALUMINUM HYDROXICE/SIMETHICONE 120; 1200; 1200 MG/30ML; MG/30ML; MG/30ML
30 SUSPENSION ORAL ONCE
Status: COMPLETED | OUTPATIENT
Start: 2024-10-13 | End: 2024-10-13

## 2024-10-13 RX ORDER — ONDANSETRON 4 MG/1
4 TABLET, ORALLY DISINTEGRATING ORAL ONCE
Status: COMPLETED | OUTPATIENT
Start: 2024-10-13 | End: 2024-10-13

## 2024-10-13 RX ORDER — METOCLOPRAMIDE HYDROCHLORIDE 5 MG/ML
10 INJECTION INTRAMUSCULAR; INTRAVENOUS ONCE
Status: COMPLETED | OUTPATIENT
Start: 2024-10-13 | End: 2024-10-13

## 2024-10-13 RX ORDER — FAMOTIDINE 20 MG/1
20 TABLET, FILM COATED ORAL ONCE
Status: COMPLETED | OUTPATIENT
Start: 2024-10-13 | End: 2024-10-13

## 2024-10-13 RX ADMIN — ONDANSETRON 4 MG: 4 TABLET, ORALLY DISINTEGRATING ORAL at 09:49

## 2024-10-13 RX ADMIN — MIDAZOLAM 4 MG: 1 INJECTION INTRAMUSCULAR; INTRAVENOUS at 09:58

## 2024-10-13 RX ADMIN — ALUMINUM HYDROXIDE, MAGNESIUM HYDROXIDE, AND SIMETHICONE 30 ML: 1200; 120; 1200 SUSPENSION ORAL at 14:02

## 2024-10-13 RX ADMIN — METOCLOPRAMIDE HYDROCHLORIDE 10 MG: 5 INJECTION INTRAMUSCULAR; INTRAVENOUS at 09:49

## 2024-10-13 RX ADMIN — ONDANSETRON 4 MG: 4 TABLET, ORALLY DISINTEGRATING ORAL at 12:27

## 2024-10-13 RX ADMIN — FAMOTIDINE 20 MG: 20 TABLET, FILM COATED ORAL at 14:02

## 2024-10-13 RX ADMIN — DIPHENHYDRAMINE HYDROCHLORIDE 25 MG: 50 INJECTION INTRAMUSCULAR; INTRAVENOUS at 09:49

## 2024-10-13 ASSESSMENT — PAIN DESCRIPTION - ORIENTATION
ORIENTATION: LOWER
ORIENTATION: RIGHT;LEFT;ANTERIOR

## 2024-10-13 ASSESSMENT — PAIN SCALES - GENERAL
PAINLEVEL_OUTOF10: 9

## 2024-10-13 ASSESSMENT — PAIN - FUNCTIONAL ASSESSMENT: PAIN_FUNCTIONAL_ASSESSMENT: 0-10

## 2024-10-13 ASSESSMENT — PAIN DESCRIPTION - LOCATION
LOCATION: ABDOMEN
LOCATION: ABDOMEN

## 2024-10-13 ASSESSMENT — PAIN DESCRIPTION - DESCRIPTORS: DESCRIPTORS: ACHING

## 2024-10-15 ENCOUNTER — OFFICE VISIT (OUTPATIENT)
Dept: INTERNAL MEDICINE | Age: 44
End: 2024-10-15
Payer: COMMERCIAL

## 2024-10-15 VITALS
OXYGEN SATURATION: 98 % | TEMPERATURE: 97.3 F | SYSTOLIC BLOOD PRESSURE: 106 MMHG | HEIGHT: 62 IN | DIASTOLIC BLOOD PRESSURE: 68 MMHG | BODY MASS INDEX: 18.95 KG/M2 | WEIGHT: 103 LBS | HEART RATE: 76 BPM

## 2024-10-15 DIAGNOSIS — Z23 NEEDS FLU SHOT: ICD-10-CM

## 2024-10-15 DIAGNOSIS — K21.9 GASTROESOPHAGEAL REFLUX DISEASE WITHOUT ESOPHAGITIS: Primary | ICD-10-CM

## 2024-10-15 DIAGNOSIS — R19.7 DIARRHEA, UNSPECIFIED TYPE: ICD-10-CM

## 2024-10-15 DIAGNOSIS — E55.9 VITAMIN D DEFICIENCY: ICD-10-CM

## 2024-10-15 DIAGNOSIS — Z86.69 HISTORY OF SEIZURE DISORDER: ICD-10-CM

## 2024-10-15 DIAGNOSIS — R11.2 INTRACTABLE NAUSEA AND VOMITING: ICD-10-CM

## 2024-10-15 PROCEDURE — G8482 FLU IMMUNIZE ORDER/ADMIN: HCPCS

## 2024-10-15 PROCEDURE — 4004F PT TOBACCO SCREEN RCVD TLK: CPT

## 2024-10-15 PROCEDURE — G8427 DOCREV CUR MEDS BY ELIG CLIN: HCPCS

## 2024-10-15 PROCEDURE — 99213 OFFICE O/P EST LOW 20 MIN: CPT

## 2024-10-15 PROCEDURE — G8420 CALC BMI NORM PARAMETERS: HCPCS

## 2024-10-15 PROCEDURE — 99211 OFF/OP EST MAY X REQ PHY/QHP: CPT | Performed by: INTERNAL MEDICINE

## 2024-10-15 PROCEDURE — 90656 IIV3 VACC NO PRSV 0.5 ML IM: CPT

## 2024-10-15 RX ORDER — PANTOPRAZOLE SODIUM 40 MG/1
40 TABLET, DELAYED RELEASE ORAL
Qty: 90 TABLET | Refills: 1 | Status: SHIPPED | OUTPATIENT
Start: 2024-10-15

## 2024-10-15 RX ORDER — LACTOSE-REDUCED FOOD
LIQUID (ML) ORAL
Qty: 30 EACH | Refills: 5 | Status: SHIPPED | OUTPATIENT
Start: 2024-10-15

## 2024-10-15 RX ORDER — ONDANSETRON 4 MG/1
TABLET, FILM COATED ORAL
Qty: 15 TABLET | Refills: 1 | Status: SHIPPED | OUTPATIENT
Start: 2024-10-15

## 2024-10-15 RX ORDER — CHOLECALCIFEROL (VITAMIN D3) 25 MCG
1 TABLET ORAL DAILY
Qty: 30 TABLET | Refills: 3 | Status: SHIPPED | OUTPATIENT
Start: 2024-10-15

## 2024-10-15 ASSESSMENT — PATIENT HEALTH QUESTIONNAIRE - PHQ9
SUM OF ALL RESPONSES TO PHQ QUESTIONS 1-9: 9
3. TROUBLE FALLING OR STAYING ASLEEP: NEARLY EVERY DAY
SUM OF ALL RESPONSES TO PHQ QUESTIONS 1-9: 9
2. FEELING DOWN, DEPRESSED OR HOPELESS: NOT AT ALL
6. FEELING BAD ABOUT YOURSELF - OR THAT YOU ARE A FAILURE OR HAVE LET YOURSELF OR YOUR FAMILY DOWN: NOT AT ALL
5. POOR APPETITE OR OVEREATING: NEARLY EVERY DAY
1. LITTLE INTEREST OR PLEASURE IN DOING THINGS: NOT AT ALL
10. IF YOU CHECKED OFF ANY PROBLEMS, HOW DIFFICULT HAVE THESE PROBLEMS MADE IT FOR YOU TO DO YOUR WORK, TAKE CARE OF THINGS AT HOME, OR GET ALONG WITH OTHER PEOPLE: NOT DIFFICULT AT ALL
SUM OF ALL RESPONSES TO PHQ QUESTIONS 1-9: 9
7. TROUBLE CONCENTRATING ON THINGS, SUCH AS READING THE NEWSPAPER OR WATCHING TELEVISION: NOT AT ALL
SUM OF ALL RESPONSES TO PHQ9 QUESTIONS 1 & 2: 0
SUM OF ALL RESPONSES TO PHQ QUESTIONS 1-9: 9
8. MOVING OR SPEAKING SO SLOWLY THAT OTHER PEOPLE COULD HAVE NOTICED. OR THE OPPOSITE, BEING SO FIGETY OR RESTLESS THAT YOU HAVE BEEN MOVING AROUND A LOT MORE THAN USUAL: NOT AT ALL
4. FEELING TIRED OR HAVING LITTLE ENERGY: NEARLY EVERY DAY
9. THOUGHTS THAT YOU WOULD BE BETTER OFF DEAD, OR OF HURTING YOURSELF: NOT AT ALL

## 2024-10-15 NOTE — PROGRESS NOTES
[facility]  [facility]  [address] [place] [address]  Dept: [phone]  Dept Fax: [phone]    Office Progress/Follow Up Note  Date of patient's visit: 10/15/2024  Patient's Name:  Scarlet Trevizo YOB: 1980            Patient Care Team:  Esteban Manrique MD as PCP - General (Internal Medicine)  Dom Beasley MD as Consulting Physician (Internal Medicine)  ________________________________________________________________________      Reason for Visit: Routine outpatient follow up/  _________________________________________________________________  Chief Complaint:  Follow-up (Pt at [facility] on 10/13/24 for vomiting, pt took medication today) and Anorexia (X 2 week)    ________________________________________________________________________  History of Presenting Illness:  History was obtained from: patient, electronic medical record. Scarlet Trevizo is a 44 y.o. is here for:    Patient is here for regular follow-up.  Patient went to [facility] on 10/13/2024 for vomiting and inability to keep food down.  Was treated symptomatically.  Patient cyclic of vomiting/intractable nausea and vomiting has been going on for several months, he underwent gastric emptying study which showed delayed emptying.  Had an EGD which showed only mild gastritis.  Patient denies cannabis use recently, last time in July.  Patient was given a referral to GI before.  Blood pressure stable 100/60, on lisinopril 10 mg.  Patient has history of seizure on Keppra 750 mg and on last visit we discussed with the patient as long as she did not have any seizures within the past few years she can discuss with neurology the need for the medication.      Patient Active Problem List   Diagnosis    Mental retardation    History of seizure disorder    Intractable nausea and vomiting    Moderate persistent asthma without complication    Cyclical vomiting    Cholangitis    Generalized

## 2024-10-15 NOTE — PROGRESS NOTES
Attending Physician Statement  I have discussed the care of Scarlet Trevizo, including pertinent history and exam findings with the resident. I have reviewed the key elements of all parts of the encounter with the resident. I agree with the assessment, and status of the problem list as documented.   Diagnosis Orders   1. Gastroesophageal reflux disease without esophagitis  pantoprazole (PROTONIX) 40 MG tablet      2. Needs flu shot  IMMUNIZ ADMIN,1 SINGLE/COMB VAC/TOXOID    Influenza, AFLURIA Trivalent, (age 3 y+), IM, Preservative Free, 0.5mL      3. Vitamin D deficiency  vitamin D3 (CHOLECALCIFEROL) 25 MCG (1000 UT) TABS tablet      4. Intractable nausea and vomiting  ondansetron (ZOFRAN) 4 MG tablet    Nutritional Supplements (ENSURE ACTIVE HIGH PROTEIN) LIQD    Katiuska Hernandez MD, Gastroenterology, [facility]      5. Diarrhea, unspecified type  Nutritional Supplements (ENSURE ACTIVE HIGH PROTEIN) LIQD      6. History of seizure disorder  Josette Bourne MD, Neurology, [facility]         The plan and orders should include   Orders Placed This Encounter   Procedures    IMMUNIZ ADMIN,1 SINGLE/COMB VAC/TOXOID    Influenza, AFLURIA Trivalent, (age 3 y+), IM, Preservative Free, 0.5mL    Katiuska Hernandez MD, Gastroenterology, [facility]    Josette Bourne MD, Neurology, [facility]    and this was also documented by the resident.  I agree with the referral to GI.The medication list was reviewed with the resident and is up to date. The return visit should be in 3 months .    Dr Sedrick Palacio MD, FACP  Associate , Internal Medicine Residency Program  Residency [facility] , [facility]  Chair, Department of Internal Medicine  [facility] Clerks[facility]         10/15/2024, 11:43 AM

## 2024-11-05 DIAGNOSIS — M54.2 NECK PAIN ON LEFT SIDE: ICD-10-CM

## 2024-11-05 DIAGNOSIS — J45.20 MILD INTERMITTENT REACTIVE AIRWAY DISEASE WITHOUT COMPLICATION: ICD-10-CM

## 2024-11-05 NOTE — TELEPHONE ENCOUNTER
Scarlet Trevizo is calling to request a refill on the following medication(s):    Medication Request:  Requested Prescriptions     Pending Prescriptions Disp Refills    albuterol sulfate HFA (PROVENTIL;VENTOLIN;PROAIR) 108 (90 Base) MCG/ACT inhaler [Pharmacy Med Name: ALBUTEROL HFA 90 MCG INHALER] 8.5 g 3     Sig: INHALE 2 PUFFS BY MOUTH EVERY 6 HOURS AS NEEDED FOR WHEEZING       Last Visit Date (If Applicable):  10/15/2024    Next Visit Date:    12/17/2024

## 2024-11-06 RX ORDER — ACETAMINOPHEN 500 MG
TABLET ORAL
Qty: 120 TABLET | Refills: 2 | OUTPATIENT
Start: 2024-11-06

## 2024-11-06 NOTE — TELEPHONE ENCOUNTER
Scarlet Trevizo is calling to request a refill on the following medication(s):    Medication Request:  Requested Prescriptions     Pending Prescriptions Disp Refills    acetaminophen (TYLENOL) 500 MG tablet [Pharmacy Med Name: ACETAMINOPHEN 500 MG TABLET] 120 tablet 2     Sig: TAKE 1 TABLET BY MOUTH 4 TIMES A DAY AS NEEDED FOR PAIN       Last Visit Date (If Applicable):  10/15/2024    Next Visit Date:    12/17/2024

## 2024-11-08 RX ORDER — ALBUTEROL SULFATE 90 UG/1
2 INHALANT RESPIRATORY (INHALATION) EVERY 6 HOURS PRN
Qty: 8.5 G | Refills: 3 | Status: SHIPPED | OUTPATIENT
Start: 2024-11-08

## 2024-12-03 DIAGNOSIS — R11.2 INTRACTABLE NAUSEA AND VOMITING: ICD-10-CM

## 2024-12-03 RX ORDER — ONDANSETRON 4 MG/1
TABLET, FILM COATED ORAL
Qty: 15 TABLET | Refills: 1 | OUTPATIENT
Start: 2024-12-03

## 2024-12-03 NOTE — TELEPHONE ENCOUNTER
Scarlet Trevizo is calling to request a refill on the following medication(s):    Medication Request:  Requested Prescriptions     Pending Prescriptions Disp Refills    levETIRAcetam (KEPPRA) 750 MG tablet [Pharmacy Med Name: levETIRAcetam 750 MG TABLET] 60 tablet 1     Sig: TAKE 1 TABLET BY MOUTH 2 TIMES A DAY    ondansetron (ZOFRAN) 4 MG tablet 15 tablet 1     Sig: TAKE 1 TABLET BY MOUTH 3 TIMES A DAY AS NEEDED FOR NAUSEA AND VOMITING       Last Visit Date (If Applicable):  10/15/2024    Next Visit Date:    12/3/2024

## 2024-12-04 RX ORDER — LEVETIRACETAM 750 MG/1
750 TABLET ORAL 2 TIMES DAILY
Qty: 60 TABLET | Refills: 1 | Status: SHIPPED | OUTPATIENT
Start: 2024-12-04

## 2024-12-04 RX ORDER — ONDANSETRON 4 MG/1
TABLET, FILM COATED ORAL
Qty: 15 TABLET | Refills: 1 | Status: SHIPPED | OUTPATIENT
Start: 2024-12-04

## 2025-01-01 DIAGNOSIS — M54.2 NECK PAIN ON LEFT SIDE: ICD-10-CM

## 2025-01-02 NOTE — TELEPHONE ENCOUNTER
Scarlet Trevizo is calling to request a refill on the following medication(s):    Medication Request:  Requested Prescriptions     Pending Prescriptions Disp Refills    acetaminophen (TYLENOL) 500 MG tablet [Pharmacy Med Name: ACETAMINOPHEN 500 MG TABLET] 120 tablet 2     Sig: TAKE 1 TABLET BY MOUTH 4 TIMES A DAY AS NEEDED FOR PAIN       Last Visit Date (If Applicable):  10/15/2024    Next Visit Date:    1/28/2025

## 2025-01-03 RX ORDER — ACETAMINOPHEN 500 MG
TABLET ORAL
Qty: 120 TABLET | Refills: 2 | OUTPATIENT
Start: 2025-01-03

## 2025-01-28 ENCOUNTER — OFFICE VISIT (OUTPATIENT)
Dept: INTERNAL MEDICINE | Age: 45
End: 2025-01-28

## 2025-01-28 VITALS
HEART RATE: 86 BPM | SYSTOLIC BLOOD PRESSURE: 98 MMHG | TEMPERATURE: 97.5 F | DIASTOLIC BLOOD PRESSURE: 86 MMHG | HEIGHT: 62 IN | WEIGHT: 108.6 LBS | OXYGEN SATURATION: 96 % | BODY MASS INDEX: 19.98 KG/M2

## 2025-01-28 DIAGNOSIS — M54.2 NECK PAIN ON LEFT SIDE: ICD-10-CM

## 2025-01-28 DIAGNOSIS — R11.2 INTRACTABLE NAUSEA AND VOMITING: ICD-10-CM

## 2025-01-28 DIAGNOSIS — J45.20 MILD INTERMITTENT REACTIVE AIRWAY DISEASE WITHOUT COMPLICATION: ICD-10-CM

## 2025-01-28 DIAGNOSIS — R51.9 NONINTRACTABLE HEADACHE, UNSPECIFIED CHRONICITY PATTERN, UNSPECIFIED HEADACHE TYPE: Primary | ICD-10-CM

## 2025-01-28 DIAGNOSIS — R19.7 DIARRHEA, UNSPECIFIED TYPE: ICD-10-CM

## 2025-01-28 RX ORDER — ALBUTEROL SULFATE 90 UG/1
2 INHALANT RESPIRATORY (INHALATION) EVERY 6 HOURS PRN
Qty: 8.5 G | Refills: 3 | Status: SHIPPED | OUTPATIENT
Start: 2025-01-28

## 2025-01-28 RX ORDER — ACETAMINOPHEN 500 MG
TABLET ORAL
Qty: 120 TABLET | Refills: 2 | Status: SHIPPED | OUTPATIENT
Start: 2025-01-28

## 2025-01-28 RX ORDER — LACTOSE-REDUCED FOOD
LIQUID (ML) ORAL
Qty: 30 EACH | Refills: 5 | Status: SHIPPED | OUTPATIENT
Start: 2025-01-28

## 2025-01-28 RX ORDER — MULTIVITAMIN WITH FOLIC ACID 400 MCG
1 TABLET ORAL DAILY
Qty: 30 TABLET | Refills: 3 | Status: SHIPPED | OUTPATIENT
Start: 2025-01-28

## 2025-01-28 SDOH — ECONOMIC STABILITY: FOOD INSECURITY: WITHIN THE PAST 12 MONTHS, THE FOOD YOU BOUGHT JUST DIDN'T LAST AND YOU DIDN'T HAVE MONEY TO GET MORE.: SOMETIMES TRUE

## 2025-01-28 SDOH — ECONOMIC STABILITY: FOOD INSECURITY: WITHIN THE PAST 12 MONTHS, YOU WORRIED THAT YOUR FOOD WOULD RUN OUT BEFORE YOU GOT MONEY TO BUY MORE.: SOMETIMES TRUE

## 2025-01-28 ASSESSMENT — PATIENT HEALTH QUESTIONNAIRE - PHQ9
SUM OF ALL RESPONSES TO PHQ QUESTIONS 1-9: 0
SUM OF ALL RESPONSES TO PHQ QUESTIONS 1-9: 0
1. LITTLE INTEREST OR PLEASURE IN DOING THINGS: NOT AT ALL
2. FEELING DOWN, DEPRESSED OR HOPELESS: NOT AT ALL
SUM OF ALL RESPONSES TO PHQ9 QUESTIONS 1 & 2: 0
SUM OF ALL RESPONSES TO PHQ QUESTIONS 1-9: 0
SUM OF ALL RESPONSES TO PHQ QUESTIONS 1-9: 0

## 2025-01-28 NOTE — PROGRESS NOTES
Attending Physician Statement  I have discussed the care of Scarlet Trevizo, including pertinent history and exam findings with the resident. I have reviewed the key elements of all parts of the encounter with the resident. I agree with the assessment, and status of the problem list as documented.    Diagnosis Orders   1. Nonintractable headache, unspecified chronicity pattern, unspecified headache type  acetaminophen (TYLENOL) 500 MG tablet      2. Mild intermittent reactive airway disease without complication  albuterol sulfate HFA (PROVENTIL;VENTOLIN;PROAIR) 108 (90 Base) MCG/ACT inhaler      3. Neck pain on left side        4. Intractable nausea and vomiting  Nutritional Supplements (ENSURE ACTIVE HIGH PROTEIN) LIQD    Multiple Vitamin (DAILY-SERGEY MULTIVITAMIN) TABS      5. Diarrhea, unspecified type  Nutritional Supplements (ENSURE ACTIVE HIGH PROTEIN) LIQD    Multiple Vitamin (DAILY-SERGEY MULTIVITAMIN) TABS        The plan and orders should include No orders of the defined types were placed in this encounter.   and this was also documented by the resident.     Dr Sedrick Palacio MD, FACP  Associate , Internal Medicine Residency Program  Residency Clinic , [facility]  Chair, Department of Internal Medicine  [facility]         1/28/2025, 11:12 AM        
02/18/2024 07:45 AM     03/21/2012 12:39 PM       BMP:    Lab Results   Component Value Date/Time     02/18/2024 07:45 AM    K 4.2 02/18/2024 07:45 AM     02/18/2024 07:45 AM    CO2 21 02/18/2024 07:45 AM    BUN 17 02/18/2024 07:45 AM    CREATININE 0.7 02/18/2024 07:45 AM    GLUCOSE 99 02/18/2024 07:45 AM    GLUCOSE 118 03/21/2012 12:39 PM       HEMOGLOBIN A1C:   Lab Results   Component Value Date/Time    LABA1C 4.6 06/05/2023 12:21 PM       FASTING LIPID PANEL:  Lab Results   Component Value Date    CHOL 210 (H) 06/11/2020    HDL 91 06/11/2020    TRIG 109 06/11/2020     ________________________________________________________________________  Health Maintenance:  Health Maintenance Due   Topic Date Due    Cervical cancer screen  02/05/2021    Breast cancer screen  Never done    COVID-19 Vaccine (3 - 2023-24 season) 09/01/2024    Annual Wellness Visit (Medicare Advantage)  Never done     ________________________________________________________________________  Assessment and Plan:    1. Nonintractable headache, unspecified chronicity pattern, unspecified headache type  -Refill sent for: Complaining of intermittent headache  - acetaminophen (TYLENOL) 500 MG tablet; TAKE 1 TABLET BY MOUTH 4 TIMES A DAY AS NEEDED FOR PAIN  Dispense: 120 tablet; Refill: 2    2. Mild intermittent asthma without complication  -Refill sent for  - albuterol sulfate HFA (PROVENTIL;VENTOLIN;PROAIR) 108 (90 Base) MCG/ACT inhaler; Inhale 2 puffs into the lungs every 6 hours as needed for Wheezing  Dispense: 8.5 g; Refill: 3      3. Intractable nausea and vomiting/diarrhea  -Follows with GI.  Refill sent for  - Nutritional Supplements (ENSURE ACTIVE HIGH PROTEIN) LIQD; DRINK ONE BY MOUTH DAILY  Dispense: 30 each; Refill: 5  - Multiple Vitamin (DAILY-SERGEY MULTIVITAMIN) TABS; Take 1 tablet by mouth daily  Dispense: 30 tablet; Refill: 3      ________________________________________________________________________  Follow up and

## 2025-01-31 DIAGNOSIS — E55.9 VITAMIN D DEFICIENCY: ICD-10-CM

## 2025-01-31 DIAGNOSIS — R11.2 INTRACTABLE NAUSEA AND VOMITING: ICD-10-CM

## 2025-01-31 RX ORDER — ONDANSETRON 4 MG/1
TABLET, FILM COATED ORAL
Qty: 15 TABLET | Refills: 1 | Status: SHIPPED | OUTPATIENT
Start: 2025-01-31

## 2025-01-31 RX ORDER — CHOLECALCIFEROL (VITAMIN D3) 25 MCG
1 TABLET ORAL DAILY
Qty: 30 TABLET | Refills: 3 | Status: SHIPPED | OUTPATIENT
Start: 2025-01-31

## 2025-01-31 RX ORDER — LEVETIRACETAM 750 MG/1
750 TABLET ORAL 2 TIMES DAILY
Qty: 60 TABLET | Refills: 1 | Status: SHIPPED | OUTPATIENT
Start: 2025-01-31

## 2025-01-31 NOTE — TELEPHONE ENCOUNTER
..Request for   Requested Prescriptions     Pending Prescriptions Disp Refills    levETIRAcetam (KEPPRA) 750 MG tablet [Pharmacy Med Name: levETIRAcetam 750 MG TABLET] 60 tablet 1     Sig: TAKE 1 TABLET BY MOUTH 2 TIMES A DAY    ondansetron (ZOFRAN) 4 MG tablet [Pharmacy Med Name: ONDANSETRON HCL 4 MG TABLET] 15 tablet 1     Sig: TAKE 1 TABLET BY MOUTH 3 TIMES A DAY AS NEEDED FOR NAUSEA AND/OR VOMITING    .      Please review and e-scribe to pharmacy listed in chart if appropriate. Thank you.      Last Visit Date: 1/28/2025  Next Visit Date: Visit date not found    No future appointments.    Health Maintenance   Topic Date Due    Cervical cancer screen  02/05/2021    Breast cancer screen  Never done    COVID-19 Vaccine (3 - 2023-24 season) 09/01/2024    Annual Wellness Visit (Medicare Advantage)  Never done    Hepatitis B vaccine (1 of 3 - 19+ 3-dose series) 06/18/2025 (Originally 2/27/2001)    Lipids  06/11/2025    Depression Screen  01/28/2026    DTaP/Tdap/Td vaccine (2 - Td or Tdap) 09/13/2026    Flu vaccine  Completed    Pneumococcal 0-64 years Vaccine  Completed    Hepatitis C screen  Completed    HIV screen  Completed    Hepatitis A vaccine  Aged Out    Hib vaccine  Aged Out    HPV vaccine  Aged Out    Polio vaccine  Aged Out    Meningococcal (ACWY) vaccine  Aged Out    Varicella vaccine  Discontinued    Depression Monitoring  Discontinued       Hemoglobin A1C (%)   Date Value   06/05/2023 4.6             ( goal A1C is < 7)   No components found for: \"LABMICR\"  No components found for: \"LDLCHOLESTEROL\", \"LDLCALC\"    (goal LDL is <100)   AST (U/L)   Date Value   02/18/2024 19     ALT (U/L)   Date Value   02/18/2024 15     BUN (mg/dL)   Date Value   02/18/2024 17     BP Readings from Last 3 Encounters:   01/28/25 98/86   10/15/24 106/68   10/13/24 (!) 115/100          (goal 120/80)    All Future Testing planned in [facility]  Lab Frequency Next Occurrence   [facility] DIGITAL SCREEN W OR WO CAD BILATERAL Once 06/18/2024

## 2025-01-31 NOTE — TELEPHONE ENCOUNTER
..Request for   Requested Prescriptions     Pending Prescriptions Disp Refills    vitamin D3 (CHOLECALCIFEROL) 25 MCG (1000 UT) TABS tablet [Pharmacy Med Name: VITAMIN D3 25 MCG TABLET] 30 tablet 3     Sig: TAKE 1 TABLET BY MOUTH DAILY    .      Please review and e-scribe to pharmacy listed in chart if appropriate. Thank you.      Last Visit Date: 1/28/2025  Next Visit Date: 1/31/2025    No future appointments.    Health Maintenance   Topic Date Due    Cervical cancer screen  02/05/2021    Breast cancer screen  Never done    COVID-19 Vaccine (3 - 2023-24 season) 09/01/2024    Annual Wellness Visit (Medicare Advantage)  Never done    Hepatitis B vaccine (1 of 3 - 19+ 3-dose series) 06/18/2025 (Originally 2/27/2001)    Lipids  06/11/2025    Depression Screen  01/28/2026    DTaP/Tdap/Td vaccine (2 - Td or Tdap) 09/13/2026    Flu vaccine  Completed    Pneumococcal 0-64 years Vaccine  Completed    Hepatitis C screen  Completed    HIV screen  Completed    Hepatitis A vaccine  Aged Out    Hib vaccine  Aged Out    HPV vaccine  Aged Out    Polio vaccine  Aged Out    Meningococcal (ACWY) vaccine  Aged Out    Varicella vaccine  Discontinued    Depression Monitoring  Discontinued       Hemoglobin A1C (%)   Date Value   06/05/2023 4.6             ( goal A1C is < 7)   No components found for: \"LABMICR\"  No components found for: \"LDLCHOLESTEROL\", \"LDLCALC\"    (goal LDL is <100)   AST (U/L)   Date Value   02/18/2024 19     ALT (U/L)   Date Value   02/18/2024 15     BUN (mg/dL)   Date Value   02/18/2024 17     BP Readings from Last 3 Encounters:   01/28/25 98/86   10/15/24 106/68   10/13/24 (!) 115/100          (goal 120/80)    All Future Testing planned in [facility]  Lab Frequency Next Occurrence   KALPANA DIGITAL SCREEN W OR WO CAD BILATERAL Once 06/18/2024         Patient Active Problem List:     Mental retardation     History of seizure disorder     Intractable nausea and vomiting     Moderate persistent asthma without complication

## 2025-02-01 DIAGNOSIS — R11.2 INTRACTABLE NAUSEA AND VOMITING: ICD-10-CM

## 2025-02-03 RX ORDER — LEVETIRACETAM 750 MG/1
750 TABLET ORAL 2 TIMES DAILY
Qty: 60 TABLET | Refills: 1 | OUTPATIENT
Start: 2025-02-03

## 2025-02-03 RX ORDER — ONDANSETRON 4 MG/1
TABLET, FILM COATED ORAL
Qty: 15 TABLET | Refills: 1 | OUTPATIENT
Start: 2025-02-03

## 2025-02-03 NOTE — TELEPHONE ENCOUNTER
Last visit: 1/28/25  Last Med refill: 1/31/25  Does patient have enough medication for 72 hours: Yes:    Next Visit Date:  No future appointments.    Health Maintenance   Topic Date Due    Cervical cancer screen  02/05/2021    Breast cancer screen  Never done    COVID-19 Vaccine (3 - 2023-24 season) 09/01/2024    Annual Wellness Visit (Medicare Advantage)  Never done    Hepatitis B vaccine (1 of 3 - 19+ 3-dose series) 06/18/2025 (Originally 2/27/2001)    Lipids  06/11/2025    Depression Screen  01/28/2026    DTaP/Tdap/Td vaccine (2 - Td or Tdap) 09/13/2026    Flu vaccine  Completed    Pneumococcal 0-64 years Vaccine  Completed    Hepatitis C screen  Completed    HIV screen  Completed    Hepatitis A vaccine  Aged Out    Hib vaccine  Aged Out    HPV vaccine  Aged Out    Polio vaccine  Aged Out    Meningococcal (ACWY) vaccine  Aged Out    Varicella vaccine  Discontinued    Depression Monitoring  Discontinued       Hemoglobin A1C (%)   Date Value   06/05/2023 4.6             ( goal A1C is < 7)   No components found for: \"LABMICR\"  No components found for: \"LDLCHOLESTEROL\", \"LDLCALC\"    (goal LDL is <100)   AST (U/L)   Date Value   02/18/2024 19     ALT (U/L)   Date Value   02/18/2024 15     BUN (mg/dL)   Date Value   02/18/2024 17     BP Readings from Last 3 Encounters:   01/28/25 98/86   10/15/24 106/68   10/13/24 (!) 115/100          (goal 120/80)    All Future Testing planned in [facility]  Lab Frequency Next Occurrence   KALPANA DIGITAL SCREEN W OR WO CAD BILATERAL Once 06/18/2024               Patient Active Problem List:     Mental retardation     History of seizure disorder     Intractable nausea and vomiting     Moderate persistent asthma without complication     Cyclical vomiting     Cholangitis     Generalized abdominal pain     Tetrahydrocannabinol (THC) dependence ([facility])     Cocaine abuse ([facility])

## 2025-03-03 RX ORDER — LEVETIRACETAM 750 MG/1
750 TABLET ORAL 2 TIMES DAILY
Qty: 60 TABLET | Refills: 1 | Status: SHIPPED | OUTPATIENT
Start: 2025-03-03

## 2025-03-03 NOTE — TELEPHONE ENCOUNTER
Scarlet Trevizo is calling to request a refill on the following medication(s):    Medication Request:  Requested Prescriptions     Pending Prescriptions Disp Refills    levETIRAcetam (KEPPRA) 750 MG tablet [Pharmacy Med Name: levETIRAcetam 750 MG TABLET] 60 tablet 1     Sig: TAKE 1 TABLET BY MOUTH 2 TIMES A DAY       Last Visit Date (If Applicable):  1/28/2025    Next Visit Date:    Visit date not found

## 2025-03-13 NOTE — PROGRESS NOTES
GI CLINIC FOLLOW UP    INTERVAL HISTORY:   Esteban Manrique MD  [facility], [address] [id number]    Chief Complaint   Patient presents with    GI Problem     Intractable nausea and vomiting, diarrhea        HISTORY OF PRESENT ILLNESS: Ms.Lanett ALESSANDRO Trevizo is a 45 y.o. female , referred for evaluation of cyclical vomiting syndrome, intractable nausea and vomiting, periportal edema.    Here for f/u  Seen by our inpatient colleagues 5/9-11 for CVS, abd pain, intractable nausea and vomiting.     She reports emesis and headaches every morning upon waking.  Reports \"constant\" hunger. She has been able to keep some food down but did have 3 episodes of emesis this morning already. No benefit from zofran. Very frequent heartburn despite pantoprazole 40mg qd in the morning. GES showing delayed gastric emptying. MRI remarkable for biliary sludge. States that she has frequent BMs and urination, especially following any PO intake (Eating or drinking).  She has been recommended to f/u neurology due to possible seizure disorder currently treated with keppra - plans to discuss her headaches with them.    She does use marijuana daily - states she has quit for 3 years without any improvement in her nausea/vomiting    Denies fever/chills, dysphagia/odynophagia,heartburn, constipation, black or bloody stools.     Last EGD:   5/10/23  Diagnosis:  Moderate gastritis biopsies were taken  Random small bowel biopsies were taken  Prominent papilla    Surgical Pathology Report Path Number: [id number]  -- Diagnosis --  A.  DUODENUM, BIOPSIES:  -DUODENAL MUCOSA WITH NO SIGNIFICANT PATHOLOGIC  ABNORMALITY.  -NO MORPHOLOGIC FEATURES OF CELIAC DISEASE.    B.  STOMACH, BIOPSIES:  -GASTRIC MUCOSA WITH MILD CHRONIC INACTIVE GASTRITIS.  -NO HELICOBACTER ORGANISMS BY H&E STAIN, DYSPLASIA OR  CARCINOMA.      Beverly Freedman  **Electronically Signed Out**        ag/5/11/2023     Last colonoscopy: none    Labs: 2/18/24 - no anemia, normal LFTs,

## 2025-03-14 ENCOUNTER — HOSPITAL ENCOUNTER (OUTPATIENT)
Age: 45
Setting detail: SPECIMEN
Discharge: HOME | End: 2025-03-14

## 2025-03-14 ENCOUNTER — OFFICE VISIT (OUTPATIENT)
Dept: GASTROENTEROLOGY | Age: 45
End: 2025-03-14
Payer: COMMERCIAL

## 2025-03-14 ENCOUNTER — RESULTS FOLLOW-UP (OUTPATIENT)
Dept: GASTROENTEROLOGY | Age: 45
End: 2025-03-14

## 2025-03-14 VITALS
HEART RATE: 89 BPM | HEIGHT: 62 IN | DIASTOLIC BLOOD PRESSURE: 70 MMHG | TEMPERATURE: 97.4 F | SYSTOLIC BLOOD PRESSURE: 108 MMHG | WEIGHT: 102 LBS | BODY MASS INDEX: 18.77 KG/M2

## 2025-03-14 DIAGNOSIS — R19.7 DIARRHEA, UNSPECIFIED TYPE: ICD-10-CM

## 2025-03-14 DIAGNOSIS — R63.4 UNINTENTIONAL WEIGHT LOSS: ICD-10-CM

## 2025-03-14 DIAGNOSIS — R11.15 CYCLICAL VOMITING: ICD-10-CM

## 2025-03-14 DIAGNOSIS — R11.2 INTRACTABLE NAUSEA AND VOMITING: ICD-10-CM

## 2025-03-14 DIAGNOSIS — R11.2 INTRACTABLE NAUSEA AND VOMITING: Primary | ICD-10-CM

## 2025-03-14 DIAGNOSIS — R35.0 URINARY FREQUENCY: ICD-10-CM

## 2025-03-14 LAB
ALBUMIN SERPL-MCNC: 4.4 G/DL (ref 3.5–5.2)
ALBUMIN/GLOB SERPL: 1.8 {RATIO} (ref 1–2.5)
ALP SERPL-CCNC: 46 U/L (ref 35–104)
ALT SERPL-CCNC: 17 U/L (ref 10–35)
ANION GAP SERPL CALCULATED.3IONS-SCNC: 10 MMOL/L (ref 9–16)
AST SERPL-CCNC: 30 U/L (ref 10–35)
BASOPHILS # BLD: 0.04 K/UL (ref 0–0.2)
BASOPHILS NFR BLD: 1 % (ref 0–2)
BILIRUB DIRECT SERPL-MCNC: 0.1 MG/DL (ref 0–0.2)
BILIRUB INDIRECT SERPL-MCNC: 0.2 MG/DL (ref 0–1)
BILIRUB SERPL-MCNC: 0.3 MG/DL (ref 0–1.2)
BILIRUB UR QL STRIP: NEGATIVE
BUN SERPL-MCNC: 12 MG/DL (ref 6–20)
CALCIUM SERPL-MCNC: 9.7 MG/DL (ref 8.6–10.4)
CHLORIDE SERPL-SCNC: 105 MMOL/L (ref 98–107)
CLARITY UR: CLEAR
CO2 SERPL-SCNC: 23 MMOL/L (ref 20–31)
COLOR UR: YELLOW
COMMENT: NORMAL
CREAT SERPL-MCNC: 0.8 MG/DL (ref 0.6–0.9)
EOSINOPHIL # BLD: 0.3 K/UL (ref 0–0.44)
EOSINOPHILS RELATIVE PERCENT: 6 % (ref 1–4)
ERYTHROCYTE [DISTWIDTH] IN BLOOD BY AUTOMATED COUNT: 12.4 % (ref 11.8–14.4)
GFR, ESTIMATED: >90 ML/MIN/1.73M2
GLUCOSE SERPL-MCNC: 96 MG/DL (ref 74–99)
GLUCOSE UR STRIP-MCNC: NEGATIVE MG/DL
HAV IGM SERPL QL IA: NONREACTIVE
HBV CORE IGM SERPL QL IA: NONREACTIVE
HBV SURFACE AG SERPL QL IA: NONREACTIVE
HCT VFR BLD AUTO: 36.8 % (ref 36.3–47.1)
HCV AB SERPL QL IA: NONREACTIVE
HGB BLD-MCNC: 13 G/DL (ref 11.9–15.1)
HGB UR QL STRIP.AUTO: NEGATIVE
IMM GRANULOCYTES # BLD AUTO: <0.03 K/UL (ref 0–0.3)
IMM GRANULOCYTES NFR BLD: 0 %
KETONES UR STRIP-MCNC: NEGATIVE MG/DL
LEUKOCYTE ESTERASE UR QL STRIP: NEGATIVE
LYMPHOCYTES NFR BLD: 1.58 K/UL (ref 1.1–3.7)
LYMPHOCYTES RELATIVE PERCENT: 34 % (ref 24–43)
MAGNESIUM SERPL-MCNC: 2 MG/DL (ref 1.6–2.6)
MCH RBC QN AUTO: 31 PG (ref 25.2–33.5)
MCHC RBC AUTO-ENTMCNC: 35.3 G/DL (ref 28.4–34.8)
MCV RBC AUTO: 87.8 FL (ref 82.6–102.9)
MONOCYTES NFR BLD: 0.28 K/UL (ref 0.1–1.2)
MONOCYTES NFR BLD: 6 % (ref 3–12)
NEUTROPHILS NFR BLD: 53 % (ref 36–65)
NEUTS SEG NFR BLD: 2.46 K/UL (ref 1.5–8.1)
NITRITE UR QL STRIP: NEGATIVE
NRBC BLD-RTO: 0 PER 100 WBC
PH UR STRIP: 6.5 [PH] (ref 5–8)
PLATELET # BLD AUTO: 382 K/UL (ref 138–453)
PMV BLD AUTO: 10.2 FL (ref 8.1–13.5)
POTASSIUM SERPL-SCNC: 4.2 MMOL/L (ref 3.7–5.3)
PROT SERPL-MCNC: 6.8 G/DL (ref 6.6–8.7)
PROT UR STRIP-MCNC: NEGATIVE MG/DL
RBC # BLD AUTO: 4.19 M/UL (ref 3.95–5.11)
SODIUM SERPL-SCNC: 138 MMOL/L (ref 136–145)
SP GR UR STRIP: 1.01 (ref 1–1.03)
UROBILINOGEN UR STRIP-ACNC: NORMAL EU/DL (ref 0–1)
WBC OTHER # BLD: 4.7 K/UL (ref 3.5–11.3)

## 2025-03-14 PROCEDURE — G8427 DOCREV CUR MEDS BY ELIG CLIN: HCPCS | Performed by: PHYSICIAN ASSISTANT

## 2025-03-14 PROCEDURE — 99214 OFFICE O/P EST MOD 30 MIN: CPT | Performed by: PHYSICIAN ASSISTANT

## 2025-03-14 PROCEDURE — 4004F PT TOBACCO SCREEN RCVD TLK: CPT | Performed by: PHYSICIAN ASSISTANT

## 2025-03-14 PROCEDURE — G8420 CALC BMI NORM PARAMETERS: HCPCS | Performed by: PHYSICIAN ASSISTANT

## 2025-03-14 RX ORDER — VITAMIN B COMPLEX
200 TABLET ORAL 2 TIMES DAILY
Qty: 120 CAPSULE | Refills: 5 | Status: SHIPPED | OUTPATIENT
Start: 2025-03-14

## 2025-03-14 RX ORDER — AMITRIPTYLINE HYDROCHLORIDE 10 MG/1
10 TABLET ORAL NIGHTLY
Qty: 90 TABLET | Refills: 0 | Status: SHIPPED | OUTPATIENT
Start: 2025-03-14

## 2025-03-27 ENCOUNTER — TELEPHONE (OUTPATIENT)
Dept: INTERNAL MEDICINE | Age: 45
End: 2025-03-27

## 2025-03-27 DIAGNOSIS — J45.20 MILD INTERMITTENT REACTIVE AIRWAY DISEASE WITHOUT COMPLICATION: ICD-10-CM

## 2025-03-27 NOTE — TELEPHONE ENCOUNTER
Received request for PA in/on Epic for albuterol inhaler.    Review of chart shows medication was dispensed on 3/2/25. PC to pharmacy, pharmacist states pt's insurance changed and new one won't cover the inhaler pt previously received. Pharmacist states they will change order over to preferred albuterol inhaler for pt, no further action needed on our end.

## 2025-03-29 DIAGNOSIS — R11.2 INTRACTABLE NAUSEA AND VOMITING: ICD-10-CM

## 2025-03-29 DIAGNOSIS — K21.9 GASTROESOPHAGEAL REFLUX DISEASE WITHOUT ESOPHAGITIS: ICD-10-CM

## 2025-03-31 RX ORDER — PANTOPRAZOLE SODIUM 40 MG/1
40 TABLET, DELAYED RELEASE ORAL
Qty: 90 TABLET | Refills: 1 | Status: SHIPPED | OUTPATIENT
Start: 2025-03-31

## 2025-03-31 RX ORDER — ONDANSETRON 4 MG/1
TABLET, FILM COATED ORAL
Qty: 15 TABLET | Refills: 1 | Status: SHIPPED | OUTPATIENT
Start: 2025-03-31

## 2025-03-31 NOTE — TELEPHONE ENCOUNTER
Last visit: 1/28/25  Last Med refill: 10/15/24  Does patient have enough medication for 72 hours: No:     Next Visit Date:  Future Appointments   Date Time Provider Department Center   4/4/2025 10:00 AM STA NM ROOM 1 [facility]   4/11/2025  9:15 AM Marita Masters PA-C [facility]       Health Maintenance   Topic Date Due    Cervical cancer screen  02/05/2021    Breast cancer screen  Never done    COVID-19 Vaccine (3 - 2024-25 season) 09/01/2024    Annual Wellness Visit (Medicare Advantage)  Never done    Hepatitis B vaccine (1 of 3 - 19+ 3-dose series) 06/18/2025 (Originally 2/27/2001)    Lipids  06/11/2025    Depression Screen  01/28/2026    DTaP/Tdap/Td vaccine (2 - Td or Tdap) 09/13/2026    Flu vaccine  Completed    Pneumococcal 0-49 years Vaccine  Completed    Hepatitis C screen  Completed    HIV screen  Completed    Hepatitis A vaccine  Aged Out    Hib vaccine  Aged Out    HPV vaccine  Aged Out    Polio vaccine  Aged Out    Meningococcal (ACWY) vaccine  Aged Out    Meningococcal B vaccine  Aged Out    Varicella vaccine  Discontinued    Depression Monitoring  Discontinued       Hemoglobin A1C (%)   Date Value   06/05/2023 4.6             ( goal A1C is < 7)   No components found for: \"LABMICR\"  No components found for: \"LDLCHOLESTEROL\", \"LDLCALC\"    (goal LDL is <100)   AST (U/L)   Date Value   03/14/2025 30     ALT (U/L)   Date Value   03/14/2025 17     BUN (mg/dL)   Date Value   03/14/2025 12     BP Readings from Last 3 Encounters:   03/14/25 108/70   01/28/25 98/86   10/15/24 106/68          (goal 120/80)    All Future Testing planned in [facility]  Lab Frequency Next Occurrence   KALPANA DIGITAL SCREEN W OR WO CAD BILATERAL Once 06/18/2024   Clostridium Difficile Toxin/Antigen Once 03/14/2025   Giardia / Cryptosporidum antigens Once 03/14/2025   H. pylori antigen Once 03/14/2025   NM HEPATOBILIARY SCAN W EJECTION FRACTION Once 03/14/2025               Patient Active Problem List:

## 2025-03-31 NOTE — TELEPHONE ENCOUNTER
Last visit: 1/28/25  Last Med refill: 1/31/25  Does patient have enough medication for 72 hours: No:     Next Visit Date:  Future Appointments   Date Time Provider Department Center   4/4/2025 10:00 AM ST[facility] NM ROOM 1 [facility]   4/11/2025  9:15 AM Marita Masters PA-C [facility]       Health Maintenance   Topic Date Due    Cervical cancer screen  02/05/2021    Breast cancer screen  Never done    COVID-19 Vaccine (3 - 2024-25 season) 09/01/2024    Annual Wellness Visit (Medicare Advantage)  Never done    Hepatitis B vaccine (1 of 3 - 19+ 3-dose series) 06/18/2025 (Originally 2/27/2001)    Lipids  06/11/2025    Depression Screen  01/28/2026    DTaP/Tdap/Td vaccine (2 - Td or Tdap) 09/13/2026    Flu vaccine  Completed    Pneumococcal 0-49 years Vaccine  Completed    Hepatitis C screen  Completed    HIV screen  Completed    Hepatitis A vaccine  Aged Out    Hib vaccine  Aged Out    HPV vaccine  Aged Out    Polio vaccine  Aged Out    Meningococcal (ACWY) vaccine  Aged Out    Meningococcal B vaccine  Aged Out    Varicella vaccine  Discontinued    Depression Monitoring  Discontinued       Hemoglobin A1C (%)   Date Value   06/05/2023 4.6             ( goal A1C is < 7)   No components found for: \"LABMICR\"  No components found for: \"LDLCHOLESTEROL\", \"LDLCALC\"    (goal LDL is <100)   AST (U/L)   Date Value   03/14/2025 30     ALT (U/L)   Date Value   03/14/2025 17     BUN (mg/dL)   Date Value   03/14/2025 12     BP Readings from Last 3 Encounters:   03/14/25 108/70   01/28/25 98/86   10/15/24 106/68          (goal 120/80)    All Future Testing planned in [facility]  Lab Frequency Next Occurrence   KALPANA DIGITAL SCREEN W OR WO CAD BILATERAL Once 06/18/2024   Clostridium Difficile Toxin/Antigen Once 03/14/2025   Giardia / Cryptosporidum antigens Once 03/14/2025   H. pylori antigen Once 03/14/2025   NM HEPATOBILIARY SCAN W EJECTION FRACTION Once 03/14/2025               Patient Active Problem List:

## 2025-04-04 ENCOUNTER — HOSPITAL ENCOUNTER (EMERGENCY)
Age: 45
Discharge: HOME | End: 2025-04-04
Attending: EMERGENCY MEDICINE
Payer: COMMERCIAL

## 2025-04-04 ENCOUNTER — RESULTS FOLLOW-UP (OUTPATIENT)
Dept: GASTROENTEROLOGY | Age: 45
End: 2025-04-04

## 2025-04-04 ENCOUNTER — APPOINTMENT (OUTPATIENT)
Dept: NUCLEAR MEDICINE | Age: 45
End: 2025-04-04
Payer: COMMERCIAL

## 2025-04-04 VITALS
SYSTOLIC BLOOD PRESSURE: 114 MMHG | RESPIRATION RATE: 14 BRPM | HEIGHT: 62 IN | WEIGHT: 108 LBS | DIASTOLIC BLOOD PRESSURE: 76 MMHG | OXYGEN SATURATION: 100 % | TEMPERATURE: 98.4 F | BODY MASS INDEX: 19.88 KG/M2 | HEART RATE: 75 BPM

## 2025-04-04 DIAGNOSIS — R11.2 NAUSEA AND VOMITING, UNSPECIFIED VOMITING TYPE: Primary | ICD-10-CM

## 2025-04-04 DIAGNOSIS — R63.4 UNINTENTIONAL WEIGHT LOSS: ICD-10-CM

## 2025-04-04 DIAGNOSIS — R11.2 INTRACTABLE NAUSEA AND VOMITING: ICD-10-CM

## 2025-04-04 LAB
ALBUMIN SERPL-MCNC: 4.2 G/DL (ref 3.5–5.2)
ALBUMIN/GLOB SERPL: 1.8 {RATIO} (ref 1–2.5)
ALP SERPL-CCNC: 44 U/L (ref 35–104)
ALT SERPL-CCNC: 16 U/L (ref 10–35)
ANION GAP SERPL CALCULATED.3IONS-SCNC: 15 MMOL/L (ref 9–16)
AST SERPL-CCNC: 21 U/L (ref 10–35)
BASOPHILS # BLD: 0.09 K/UL (ref 0–0.2)
BASOPHILS NFR BLD: 1 % (ref 0–2)
BILIRUB SERPL-MCNC: <0.2 MG/DL (ref 0–1.2)
BUN SERPL-MCNC: 13 MG/DL (ref 6–20)
CALCIUM SERPL-MCNC: 9.1 MG/DL (ref 8.6–10.4)
CHLORIDE SERPL-SCNC: 106 MMOL/L (ref 98–107)
CO2 SERPL-SCNC: 19 MMOL/L (ref 20–31)
CREAT SERPL-MCNC: 0.9 MG/DL (ref 0.5–0.9)
EOSINOPHIL # BLD: 0.08 K/UL (ref 0–0.44)
EOSINOPHILS RELATIVE PERCENT: 1 % (ref 1–4)
ERYTHROCYTE [DISTWIDTH] IN BLOOD BY AUTOMATED COUNT: 12 % (ref 11.8–14.4)
GFR, ESTIMATED: 81 ML/MIN/1.73M2
GLUCOSE SERPL-MCNC: 87 MG/DL (ref 74–99)
HCT VFR BLD AUTO: 36.3 % (ref 36.3–47.1)
HGB BLD-MCNC: 13.4 G/DL (ref 11.9–15.1)
IMM GRANULOCYTES # BLD AUTO: 0.03 K/UL (ref 0–0.3)
IMM GRANULOCYTES NFR BLD: 0 %
LYMPHOCYTES NFR BLD: 2.62 K/UL (ref 1.1–3.7)
LYMPHOCYTES RELATIVE PERCENT: 31 % (ref 24–43)
MCH RBC QN AUTO: 32.8 PG (ref 25.2–33.5)
MCHC RBC AUTO-ENTMCNC: 36.9 G/DL (ref 28.4–34.8)
MCV RBC AUTO: 88.8 FL (ref 82.6–102.9)
MONOCYTES NFR BLD: 0.41 K/UL (ref 0.1–1.2)
MONOCYTES NFR BLD: 5 % (ref 3–12)
NEUTROPHILS NFR BLD: 62 % (ref 36–65)
NEUTS SEG NFR BLD: 5.23 K/UL (ref 1.5–8.1)
NRBC BLD-RTO: 0 PER 100 WBC
PLATELET # BLD AUTO: 341 K/UL (ref 138–453)
PMV BLD AUTO: 9 FL (ref 8.1–13.5)
POTASSIUM SERPL-SCNC: 3.5 MMOL/L (ref 3.7–5.3)
PROT SERPL-MCNC: 6.6 G/DL (ref 6.6–8.7)
RBC # BLD AUTO: 4.09 M/UL (ref 3.95–5.11)
SODIUM SERPL-SCNC: 139 MMOL/L (ref 136–145)
WBC OTHER # BLD: 8.5 K/UL (ref 3.5–11.3)

## 2025-04-04 PROCEDURE — 6360000004 HC RX CONTRAST MEDICATION: Performed by: PHYSICIAN ASSISTANT

## 2025-04-04 PROCEDURE — 3430000000 HC RX DIAGNOSTIC RADIOPHARMACEUTICAL: Performed by: PHYSICIAN ASSISTANT

## 2025-04-04 PROCEDURE — 96365 THER/PROPH/DIAG IV INF INIT: CPT

## 2025-04-04 PROCEDURE — 6360000002 HC RX W HCPCS: Performed by: EMERGENCY MEDICINE

## 2025-04-04 PROCEDURE — 85025 COMPLETE CBC W/AUTO DIFF WBC: CPT

## 2025-04-04 PROCEDURE — 80053 COMPREHEN METABOLIC PANEL: CPT

## 2025-04-04 PROCEDURE — A9537 TC99M MEBROFENIN: HCPCS | Performed by: PHYSICIAN ASSISTANT

## 2025-04-04 PROCEDURE — 78227 HEPATOBIL SYST IMAGE W/DRUG: CPT

## 2025-04-04 PROCEDURE — 2580000003 HC RX 258: Performed by: PHYSICIAN ASSISTANT

## 2025-04-04 PROCEDURE — 96375 TX/PRO/DX INJ NEW DRUG ADDON: CPT

## 2025-04-04 PROCEDURE — 99284 EMERGENCY DEPT VISIT MOD MDM: CPT

## 2025-04-04 PROCEDURE — 2580000003 HC RX 258: Performed by: EMERGENCY MEDICINE

## 2025-04-04 RX ORDER — ONDANSETRON 2 MG/ML
4 INJECTION INTRAMUSCULAR; INTRAVENOUS ONCE
Status: COMPLETED | OUTPATIENT
Start: 2025-04-04 | End: 2025-04-04

## 2025-04-04 RX ORDER — KETOROLAC TROMETHAMINE 15 MG/ML
15 INJECTION, SOLUTION INTRAMUSCULAR; INTRAVENOUS ONCE
Status: COMPLETED | OUTPATIENT
Start: 2025-04-04 | End: 2025-04-04

## 2025-04-04 RX ORDER — 0.9 % SODIUM CHLORIDE 0.9 %
500 INTRAVENOUS SOLUTION INTRAVENOUS ONCE
Status: COMPLETED | OUTPATIENT
Start: 2025-04-04 | End: 2025-04-04

## 2025-04-04 RX ADMIN — ONDANSETRON 4 MG: 2 INJECTION, SOLUTION INTRAMUSCULAR; INTRAVENOUS at 08:40

## 2025-04-04 RX ADMIN — Medication 5.3 MILLICURIE: at 10:08

## 2025-04-04 RX ADMIN — SODIUM CHLORIDE 0.98 MCG: 9 INJECTION, SOLUTION INTRAVENOUS at 09:30

## 2025-04-04 RX ADMIN — SODIUM CHLORIDE 500 ML: 0.9 INJECTION, SOLUTION INTRAVENOUS at 08:40

## 2025-04-04 RX ADMIN — KETOROLAC TROMETHAMINE 15 MG: 15 INJECTION, SOLUTION INTRAMUSCULAR; INTRAVENOUS at 08:40

## 2025-04-04 RX ADMIN — SODIUM CHLORIDE 0.98 MCG: 9 INJECTION, SOLUTION INTRAVENOUS at 11:00

## 2025-04-04 RX ADMIN — SODIUM CHLORIDE, PRESERVATIVE FREE 20 MG: 5 INJECTION INTRAVENOUS at 08:40

## 2025-04-04 ASSESSMENT — PAIN SCALES - GENERAL: PAINLEVEL_OUTOF10: 5

## 2025-04-04 ASSESSMENT — ENCOUNTER SYMPTOMS
NAUSEA: 1
ABDOMINAL PAIN: 1
VOMITING: 1

## 2025-04-04 NOTE — ED NOTES
Pt presents to ER from home due to Nausea and Vomiting.Pt states nausea started 2 days ago.Pt states she is urinating, Pt denies frequency or dysuria. Pt states she is having BM.Pt is A/O x 4, equal chest expansion with non labored breathing, wheels locked, bed in lowest position, call light in reach.

## 2025-04-04 NOTE — ED PROVIDER NOTES
EMERGENCY DEPARTMENT ENCOUNTER    Pt Name: Scarlet Trevizo  MRN: [id number]  Birthdate 1980  Date of evaluation: 25  CHIEF COMPLAINT       Chief Complaint   Patient presents with    Vomiting     Since yesterday      HISTORY OF PRESENT ILLNESS   45-year-old female presents emergency room for nausea and vomiting.  Patient has diagnosis of THC dependence generalized abdominal pain and cyclical vomiting syndrome.  She was supposed to get a hepatobiliary study done earlier this week.  She was supposed to remain n.p.o. for this.  She did so and the procedure got canceled.  When she tried to restart her diet she started vomiting again.  She has been having nausea and vomiting over the last couple of days seeking evaluation today.  She reports generalized abdominal discomfort as well.             REVIEW OF SYSTEMS     Review of Systems   Gastrointestinal:  Positive for abdominal pain, nausea and vomiting.     PASTMEDICAL HISTORY     Past Medical History:   Diagnosis Date    Cocaine abuse ([facility]) 2023    GERD (gastroesophageal reflux disease)     Intractable nausea and vomiting 10/7/2021    Irritable bowel syndrome without diarrhea 2016    Moderate persistent asthma without complication     Moderate persistent asthma without complication     Seizures ([facility])     2013, 14     Past Problem List  Patient Active Problem List   Diagnosis Code    Mental retardation F79    History of seizure disorder Z86.69    Intractable nausea and vomiting R11.2    Moderate persistent asthma without complication J45.40    Cyclical vomiting R11.15    Cholangitis ([facility]) K83.09    Generalized abdominal pain R10.84    Tetrahydrocannabinol (THC) dependence ([facility]) F12.20    Cocaine abuse F14.10     SURGICAL HISTORY       Past Surgical History:   Procedure Laterality Date     SECTION          TUBAL LIGATION          UPPER GASTROINTESTINAL ENDOSCOPY N/A 10/9/2021    EGD BIOPSY performed by Katiuska Kingston MD at [facility] OR     THIS ENCOUNTER:  Orders Placed This Encounter   Medications    famotidine (PEPCID) 20 MG/2ML 20 mg in sodium chloride (PF) 0.9 % 10 mL injection    ondansetron (ZOFRAN) injection 4 mg    sodium chloride 0.9 % bolus 500 mL    ketorolac (TORADOL) injection 15 mg    sincalide (KINEVAC) 0.98 mcg in sodium chloride 0.9 % 100 mL infusion    technetium mebrofenin (CHOLETEC) injection 5.3 millicurie    sincalide (KINEVAC) 0.98 mcg in sodium chloride 0.9 % 100 mL infusion     DISCHARGE PRESCRIPTIONS:  Discharge Medication List as of 4/4/2025 12:13 PM        PHYSICIAN CONSULTS ORDERED THIS ENCOUNTER:  None  FINAL IMPRESSION      1. Nausea and vomiting, unspecified vomiting type    2. Intractable nausea and vomiting    3. Unintentional weight loss          DISPOSITION/PLAN   DISPOSITION Decision To Discharge 04/04/2025 12:02:51 PM   DISPOSITION CONDITION Stable           OUTPATIENT FOLLOW UP THE PATIENT:  No follow-up provider specified.    Erica B Goldberger, MD Goldberger, Erica B, MD  04/04/25 [id number]

## 2025-04-04 NOTE — DISCHARGE INSTRUCTIONS
Continue with antinausea meds at home as needed.  I recommend very light diet for today.  Make sure to follow-up with your GI specialist.

## 2025-04-11 ENCOUNTER — OFFICE VISIT (OUTPATIENT)
Dept: GASTROENTEROLOGY | Age: 45
End: 2025-04-11
Payer: COMMERCIAL

## 2025-04-11 ENCOUNTER — PREP FOR PROCEDURE (OUTPATIENT)
Dept: GASTROENTEROLOGY | Age: 45
End: 2025-04-11

## 2025-04-11 VITALS
DIASTOLIC BLOOD PRESSURE: 68 MMHG | OXYGEN SATURATION: 96 % | HEIGHT: 62 IN | RESPIRATION RATE: 18 BRPM | BODY MASS INDEX: 20.43 KG/M2 | SYSTOLIC BLOOD PRESSURE: 105 MMHG | HEART RATE: 80 BPM | WEIGHT: 111 LBS

## 2025-04-11 DIAGNOSIS — K31.84 GASTROPARESIS: ICD-10-CM

## 2025-04-11 DIAGNOSIS — R19.7 DIARRHEA, UNSPECIFIED TYPE: ICD-10-CM

## 2025-04-11 DIAGNOSIS — F12.20 TETRAHYDROCANNABINOL (THC) DEPENDENCE (HCC): ICD-10-CM

## 2025-04-11 DIAGNOSIS — R11.2 INTRACTABLE NAUSEA AND VOMITING: ICD-10-CM

## 2025-04-11 DIAGNOSIS — R11.15 CYCLICAL VOMITING: ICD-10-CM

## 2025-04-11 DIAGNOSIS — K31.84 GASTROPARESIS: Primary | ICD-10-CM

## 2025-04-11 PROCEDURE — 99214 OFFICE O/P EST MOD 30 MIN: CPT | Performed by: PHYSICIAN ASSISTANT

## 2025-04-11 PROCEDURE — G8420 CALC BMI NORM PARAMETERS: HCPCS | Performed by: PHYSICIAN ASSISTANT

## 2025-04-11 PROCEDURE — 4004F PT TOBACCO SCREEN RCVD TLK: CPT | Performed by: PHYSICIAN ASSISTANT

## 2025-04-11 PROCEDURE — G8427 DOCREV CUR MEDS BY ELIG CLIN: HCPCS | Performed by: PHYSICIAN ASSISTANT

## 2025-04-11 RX ORDER — METOCLOPRAMIDE 5 MG/1
5 TABLET ORAL 3 TIMES DAILY
Qty: 90 TABLET | Refills: 3 | Status: SHIPPED | OUTPATIENT
Start: 2025-04-11

## 2025-04-11 NOTE — TELEPHONE ENCOUNTER
Procedure scheduled/Dr Kingston  Procedure:Colonoscopy / EGD   Dx: Diarrhea, unspecified type; Gastroparesis; Intractable nausea and vomiting   Date:08/22/2025  Time:9:15 am / Arrive: 7:45 am  Hospital:[facility] phone call:ortiz  Bowel Prep instructions given:letters tab - golytely  In office/via phone:office   Clearance needed:n/a  GLP-1: n/a

## 2025-04-11 NOTE — PROGRESS NOTES
Signed Out**        ag/2023      Last colonoscopy: none     Labs: 25 - no anemia, normal LFTs     Last abd imaging:   Gastric emptying study 24 - IMPRESSION:  Gastric emptying is mildly delayed at 4 hours.     MRI MRCP 5/10/23 - IMPRESSION:  1. Both kidneys contain innumerable cysts of varying complexity compatible  with autosomal dominant polycystic kidney disease.  No suspicious lesion seen.  2. Gallbladder is filled with sludge but there is no evidence of acute  cholecystitis.  No biliary ductal dilatation or choledocholithiasis.    HIDA scan 25  IMPRESSION:  No evidence of cholecystitis.  Normal gallbladder ejection fraction.  No enterogastric bile reflux.    PAST MEDICAL HISTORY:  Past Medical History:   Diagnosis Date    Cocaine abuse 2023    GERD (gastroesophageal reflux disease)     Intractable nausea and vomiting 10/7/2021    Irritable bowel syndrome without diarrhea 2016    Moderate persistent asthma without complication     Moderate persistent asthma without complication     Seizures ([facility])     2013, 14       Past Surgical History:   Procedure Laterality Date     SECTION          TUBAL LIGATION          UPPER GASTROINTESTINAL ENDOSCOPY N/A 10/9/2021    EGD BIOPSY performed by Katiuska Kingston MD at [facility] OR    UPPER GASTROINTESTINAL ENDOSCOPY N/A 5/10/2023    EGD BIOPSY performed by Katiuska Kingston MD at [facility] OR       CURRENT MEDICATIONS:    Current Outpatient Medications:     metoclopramide (REGLAN) 5 MG tablet, Take 1 tablet by mouth 3 times daily, Disp: 90 tablet, Rfl: 3    pantoprazole (PROTONIX) 40 MG tablet, TAKE ONE TABLET BY MOUTH EVERY MORNING BEFORE BREAKFAST, Disp: 90 tablet, Rfl: 1    ondansetron (ZOFRAN) 4 MG tablet, TAKE 1 TABLET BY MOUTH 3 TIMES A DAY AS NEEDED FOR NAUSEA AND/OR VOMITING, Disp: 15 tablet, Rfl: 1    amitriptyline (ELAVIL) 10 MG tablet, Take 1 tablet by mouth nightly, Disp: 90 tablet, Rfl: 0    Coenzyme Q10 (COQ10) 100 MG CAPS, Take

## 2025-04-14 RX ORDER — POLYETHYLENE GLYCOL 3350, SODIUM CHLORIDE, SODIUM BICARBONATE, POTASSIUM CHLORIDE 420; 11.2; 5.72; 1.48 G/4L; G/4L; G/4L; G/4L
POWDER, FOR SOLUTION ORAL
Qty: 4000 ML | Refills: 0 | Status: SHIPPED | OUTPATIENT
Start: 2025-04-14

## 2025-04-14 RX ORDER — BISACODYL 5 MG
TABLET, DELAYED RELEASE (ENTERIC COATED) ORAL
Qty: 4 TABLET | Refills: 0 | Status: SHIPPED | OUTPATIENT
Start: 2025-04-14

## 2025-04-23 DIAGNOSIS — R51.9 NONINTRACTABLE HEADACHE, UNSPECIFIED CHRONICITY PATTERN, UNSPECIFIED HEADACHE TYPE: ICD-10-CM

## 2025-04-24 RX ORDER — ACETAMINOPHEN 500 MG
TABLET ORAL
Qty: 120 TABLET | Refills: 2 | Status: SHIPPED | OUTPATIENT
Start: 2025-04-24

## 2025-04-24 NOTE — TELEPHONE ENCOUNTER
Last visit: 1/28/25  Last Med refill: 1/28/25  Does patient have enough medication for 72 hours: No:     Next Visit Date:  Future Appointments   Date Time Provider Department Center   8/29/2025 11:00 AM Marita Masters PA-C [facility]       Health Maintenance   Topic Date Due    Cervical cancer screen  02/05/2021    Breast cancer screen  Never done    COVID-19 Vaccine (3 - 2024-25 season) 09/01/2024    Annual Wellness Visit (Medicare Advantage)  Never done    Hepatitis B vaccine (1 of 3 - 19+ 3-dose series) 06/18/2025 (Originally 2/27/2001)    Lipids  06/11/2025    Depression Screen  01/28/2026    DTaP/Tdap/Td vaccine (2 - Td or Tdap) 09/13/2026    Flu vaccine  Completed    Pneumococcal 0-49 years Vaccine  Completed    Hepatitis C screen  Completed    HIV screen  Completed    Hepatitis A vaccine  Aged Out    Hib vaccine  Aged Out    HPV vaccine  Aged Out    Polio vaccine  Aged Out    Meningococcal (ACWY) vaccine  Aged Out    Meningococcal B vaccine  Aged Out    Varicella vaccine  Discontinued    Depression Monitoring  Discontinued       Hemoglobin A1C (%)   Date Value   06/05/2023 4.6             ( goal A1C is < 7)   No components found for: \"LABMICR\"  No components found for: \"LDLCHOLESTEROL\", \"LDLCALC\"    (goal LDL is <100)   AST (U/L)   Date Value   04/04/2025 21     ALT (U/L)   Date Value   04/04/2025 16     BUN (mg/dL)   Date Value   04/04/2025 13     BP Readings from Last 3 Encounters:   04/11/25 105/68   04/04/25 114/76   03/14/25 108/70          (goal 120/80)    All Future Testing planned in [facility]  Lab Frequency Next Occurrence   KALPANA DIGITAL SCREEN W OR WO CAD BILATERAL Once 06/18/2024   H. Pylori Antigen, EIA Once 04/11/2025   Giardia / Cryptosporidum antigens Once 04/11/2025   Clostridium Difficile Toxin/Antigen Once 04/11/2025   Pancreatic Elastase, Fecal Once 04/11/2025   EGD Once 04/11/2025   COLONOSCOPY W/ OR W/O BIOPSY Once 04/11/2025               Patient Active Problem List:

## 2025-04-27 DIAGNOSIS — Q61.3 POLYCYSTIC KIDNEY DISEASE: ICD-10-CM

## 2025-04-28 RX ORDER — LISINOPRIL 10 MG/1
10 TABLET ORAL DAILY
Qty: 90 TABLET | Refills: 1 | Status: SHIPPED | OUTPATIENT
Start: 2025-04-28

## 2025-04-28 NOTE — TELEPHONE ENCOUNTER
Last visit: 1/28/25  Last Med refill: 6/18/24  Does patient have enough medication for 72 hours: No:     Next Visit Date:  Future Appointments   Date Time Provider Department Center   8/29/2025 11:00 AM Marita Masters PA-C [facility]       Health Maintenance   Topic Date Due    Cervical cancer screen  02/05/2021    Breast cancer screen  Never done    COVID-19 Vaccine (3 - 2024-25 season) 09/01/2024    Annual Wellness Visit (Medicare Advantage)  Never done    Hepatitis B vaccine (1 of 3 - 19+ 3-dose series) 06/18/2025 (Originally 2/27/2001)    Lipids  06/11/2025    Depression Screen  01/28/2026    DTaP/Tdap/Td vaccine (2 - Td or Tdap) 09/13/2026    Flu vaccine  Completed    Pneumococcal 0-49 years Vaccine  Completed    Hepatitis C screen  Completed    HIV screen  Completed    Hepatitis A vaccine  Aged Out    Hib vaccine  Aged Out    HPV vaccine  Aged Out    Polio vaccine  Aged Out    Meningococcal (ACWY) vaccine  Aged Out    Meningococcal B vaccine  Aged Out    Varicella vaccine  Discontinued    Depression Monitoring  Discontinued       Hemoglobin A1C (%)   Date Value   06/05/2023 4.6             ( goal A1C is < 7)   No components found for: \"LABMICR\"  No components found for: \"LDLCHOLESTEROL\", \"LDLCALC\"    (goal LDL is <100)   AST (U/L)   Date Value   04/04/2025 21     ALT (U/L)   Date Value   04/04/2025 16     BUN (mg/dL)   Date Value   04/04/2025 13     BP Readings from Last 3 Encounters:   04/11/25 105/68   04/04/25 114/76   03/14/25 108/70          (goal 120/80)    All Future Testing planned in [facility]  Lab Frequency Next Occurrence   KALPANA DIGITAL SCREEN W OR WO CAD BILATERAL Once 06/18/2024   H. Pylori Antigen, EIA Once 04/11/2025   Giardia / Cryptosporidum antigens Once 04/11/2025   Clostridium Difficile Toxin/Antigen Once 04/11/2025   Pancreatic Elastase, Fecal Once 04/11/2025   EGD Once 04/11/2025   COLONOSCOPY W/ OR W/O BIOPSY Once 04/11/2025               Patient Active Problem List:

## 2025-05-03 ENCOUNTER — HOSPITAL ENCOUNTER (EMERGENCY)
Age: 45
Discharge: HOME | End: 2025-05-03
Attending: EMERGENCY MEDICINE
Payer: COMMERCIAL

## 2025-05-03 VITALS
TEMPERATURE: 98.2 F | SYSTOLIC BLOOD PRESSURE: 159 MMHG | HEART RATE: 77 BPM | OXYGEN SATURATION: 99 % | DIASTOLIC BLOOD PRESSURE: 81 MMHG | RESPIRATION RATE: 18 BRPM

## 2025-05-03 DIAGNOSIS — K52.9 GASTROENTERITIS: Primary | ICD-10-CM

## 2025-05-03 LAB
BILIRUB UR QL STRIP: NEGATIVE
CLARITY UR: CLEAR
COLOR UR: YELLOW
FLUAV RNA RESP QL NAA+PROBE: NOT DETECTED
FLUBV RNA RESP QL NAA+PROBE: NOT DETECTED
GLUCOSE UR STRIP-MCNC: NEGATIVE MG/DL
HGB UR QL STRIP.AUTO: NEGATIVE
KETONES UR STRIP-MCNC: NEGATIVE MG/DL
LEUKOCYTE ESTERASE UR QL STRIP: NEGATIVE
NITRITE UR QL STRIP: NEGATIVE
PH UR STRIP: 6 [PH] (ref 5–8)
PROT UR STRIP-MCNC: NEGATIVE MG/DL
SARS-COV-2 RNA RESP QL NAA+PROBE: NOT DETECTED
SOURCE: NORMAL
SP GR UR STRIP: 1.02 (ref 1–1.03)
SPECIMEN DESCRIPTION: NORMAL
UROBILINOGEN UR STRIP-ACNC: NORMAL EU/DL (ref 0–1)

## 2025-05-03 PROCEDURE — 81003 URINALYSIS AUTO W/O SCOPE: CPT

## 2025-05-03 PROCEDURE — 87636 SARSCOV2 & INF A&B AMP PRB: CPT

## 2025-05-03 PROCEDURE — 99283 EMERGENCY DEPT VISIT LOW MDM: CPT

## 2025-05-03 PROCEDURE — 6370000000 HC RX 637 (ALT 250 FOR IP): Performed by: EMERGENCY MEDICINE

## 2025-05-03 RX ORDER — ONDANSETRON 4 MG/1
4 TABLET, ORALLY DISINTEGRATING ORAL 3 TIMES DAILY PRN
Qty: 21 TABLET | Refills: 0 | Status: SHIPPED | OUTPATIENT
Start: 2025-05-03

## 2025-05-03 RX ORDER — ONDANSETRON 4 MG/1
4 TABLET, ORALLY DISINTEGRATING ORAL ONCE
Status: COMPLETED | OUTPATIENT
Start: 2025-05-03 | End: 2025-05-03

## 2025-05-03 RX ADMIN — ONDANSETRON 4 MG: 4 TABLET, ORALLY DISINTEGRATING ORAL at 09:31

## 2025-05-03 ASSESSMENT — ENCOUNTER SYMPTOMS
ABDOMINAL PAIN: 1
NAUSEA: 1
DIARRHEA: 1
VOMITING: 1

## 2025-05-03 ASSESSMENT — PAIN DESCRIPTION - PAIN TYPE: TYPE: ACUTE PAIN

## 2025-05-03 ASSESSMENT — PAIN DESCRIPTION - DESCRIPTORS: DESCRIPTORS: BURNING

## 2025-05-03 ASSESSMENT — LIFESTYLE VARIABLES
HOW MANY STANDARD DRINKS CONTAINING ALCOHOL DO YOU HAVE ON A TYPICAL DAY: 1 OR 2
HOW OFTEN DO YOU HAVE A DRINK CONTAINING ALCOHOL: MONTHLY OR LESS

## 2025-05-03 ASSESSMENT — PAIN DESCRIPTION - LOCATION: LOCATION: ABDOMEN

## 2025-05-03 ASSESSMENT — PAIN SCALES - GENERAL: PAINLEVEL_OUTOF10: 5

## 2025-05-03 NOTE — ED PROVIDER NOTES
EMERGENCY DEPARTMENT ENCOUNTER    Pt Name: Scarlet Trevizo  MRN: [id number]  Birthdate 1980  Date of evaluation: 5/3/25  CHIEF COMPLAINT       Chief Complaint   Patient presents with    Abdominal Pain    Vomiting    Nausea    Diarrhea     HISTORY OF PRESENT ILLNESS   Patient is a 45-year-old female who presents to the emergency department today with a chief complaint of generalized abdominal pain with nausea, vomiting and diarrhea.  Reports that symptoms started at around 5 AM.  States that her mother has similar symptoms and she is also currently being treated and evaluated in the ER.  Denies any known trigger for her symptoms.  Denies any other systemic signs or symptoms otherwise.             REVIEW OF SYSTEMS     Review of Systems   Gastrointestinal:  Positive for abdominal pain, diarrhea, nausea and vomiting.   All other systems reviewed and are negative.    PASTMEDICAL HISTORY     Past Medical History:   Diagnosis Date    Cocaine abuse ([facility]) 2023    GERD (gastroesophageal reflux disease)     Intractable nausea and vomiting 10/7/2021    Irritable bowel syndrome without diarrhea 2016    Moderate persistent asthma without complication     Moderate persistent asthma without complication     Seizures ([facility])     2013, 14     Past Problem List  Patient Active Problem List   Diagnosis Code    Mental retardation F79    History of seizure disorder Z86.69    Intractable nausea and vomiting R11.2    Moderate persistent asthma without complication J45.40    Cyclical vomiting R11.15    Cholangitis ([facility]) K83.09    Generalized abdominal pain R10.84    Tetrahydrocannabinol (THC) dependence ([facility]) F12.20    Cocaine abuse ([facility]) F14.10    Gastroparesis K31.84    Diarrhea R19.7     SURGICAL HISTORY       Past Surgical History:   Procedure Laterality Date     SECTION          TUBAL LIGATION          UPPER GASTROINTESTINAL ENDOSCOPY N/A 10/9/2021    EGD BIOPSY performed by Katiuska Kingston MD at [facility]  MAKING / ED COURSE:   Summary of Patient Presentation:            1)  Number and Complexity of Problems Addressed at this Encounter  Problem List This Visit:  abdominal pain, nausea, vomiting      Differential Diagnosis: gastroenteritis, colitis, cystitis    Diagnoses Considered but Do Not Suspect:  NA    Pertinent Comorbid Conditions:  NA        2)  Data Reviewed  My EKG interpretation:  NA     Decision Rules/Scores utilized:  NA    HEART SCORE: NA       NIH STROKE SCALE         Tests considered but not ordered and why:  NA    External Documents Reviewed:  NA    Imaging that is independently reviewed and interpreted by me as:  NA    SEPSIS    Is this patient to be included in the SEP-1 core measure due to severe sepsis or septic shock? No Exclusion criteria - the patient is NOT to be included for SEP-1 Core Measure due to: 2+ SIRS criteria are not met     See more data below for the lab and radiology tests and orders.      3)  Treatment and Disposition    Patient assessment:  Refer to the ED course     Disposition discussion with patient/family, Shared Decision Making:  Yes    Case discussed with consulting clinician:  SCHUYLER    MIPS:  NA    Social determinants of health impacting treatment or disposition:  NA    Code Status Discussion:  NA    CRITICAL CARE:       PROCEDURES:  NA    Procedures    ED Course as of 05/03/25 1742   Sat May 03, 2025   0926 Patient is a 45-year-old female who presents to the emergency department today with a chief complaint of generalized abdominal pain with nausea, vomiting and diarrhea.  Reports that symptoms started at around 5 AM.  States that her mother has similar symptoms and she is also currently being treated and evaluated in the ER.  Denies any known trigger for her symptoms.  Denies any other systemic signs or symptoms otherwise. [AP]   1013 SARS-CoV-2 RNA, RT PCR: Not Detected [AP]   1013 Influenza A: Not Detected [AP]   1013 Influenza B: Not Detected [AP]   1013 Nitrite, Urine:

## 2025-05-03 NOTE — DISCHARGE INSTRUCTIONS
You may take Zofran as needed for nausea and vomiting.  Follow brat diet which is comprised of banana, rice, apple and toast and gradually advance your diet as tolerated.  Follow-up with your primary care physician and return back to the emergency department immediately if you notice any concerning or worsening signs or symptoms.

## 2025-05-27 DIAGNOSIS — R11.2 INTRACTABLE NAUSEA AND VOMITING: ICD-10-CM

## 2025-05-27 RX ORDER — LEVETIRACETAM 750 MG/1
750 TABLET ORAL 2 TIMES DAILY
Qty: 60 TABLET | Refills: 1 | Status: SHIPPED | OUTPATIENT
Start: 2025-05-27

## 2025-05-27 RX ORDER — ONDANSETRON 4 MG/1
TABLET, FILM COATED ORAL
Qty: 15 TABLET | Refills: 1 | Status: SHIPPED | OUTPATIENT
Start: 2025-05-27

## 2025-06-10 DIAGNOSIS — R11.15 CYCLICAL VOMITING: ICD-10-CM

## 2025-06-10 RX ORDER — AMITRIPTYLINE HYDROCHLORIDE 10 MG/1
TABLET ORAL
Qty: 90 TABLET | Refills: 0 | Status: SHIPPED | OUTPATIENT
Start: 2025-06-10

## 2025-08-11 DIAGNOSIS — E55.9 VITAMIN D DEFICIENCY: ICD-10-CM

## 2025-08-11 RX ORDER — CHOLECALCIFEROL (VITAMIN D3) 25 MCG
1 TABLET ORAL DAILY
Qty: 30 TABLET | Refills: 3 | Status: CANCELLED | OUTPATIENT
Start: 2025-08-11

## 2025-08-13 DIAGNOSIS — E55.9 VITAMIN D DEFICIENCY: ICD-10-CM

## 2025-08-13 RX ORDER — CHOLECALCIFEROL (VITAMIN D3) 25 MCG
1 TABLET ORAL DAILY
Qty: 30 TABLET | Refills: 3 | Status: SHIPPED | OUTPATIENT
Start: 2025-08-13

## (undated) DEVICE — CUP MED 1OZ CLR POLYPR FEED GRAD W/O LID

## (undated) DEVICE — Device: Brand: DEFENDO VALVE AND CONNECTOR KIT

## (undated) DEVICE — BASIN EMSIS 700ML GRAPHITE PLAS KID SHP GRAD

## (undated) DEVICE — JELLY,LUBE,STERILE,FLIP TOP,TUBE,2-OZ: Brand: MEDLINE

## (undated) DEVICE — ADAPTER TBNG LUER STUB 15 GA INTMED

## (undated) DEVICE — BITEBLOCK ENDOSCP 60FR MAXI WHT POLYETH STURDY W/ VELC WVN

## (undated) DEVICE — GAUZE,SPONGE,4"X4",16PLY,STRL,LF,10/TRAY: Brand: MEDLINE

## (undated) DEVICE — MEDICINE CUP, GRADUATED, STER: Brand: MEDLINE

## (undated) DEVICE — CO2 CANNULA,SUPERSOFT, ADLT,7'O2,7'CO2: Brand: MEDLINE

## (undated) DEVICE — FORCEPS BX L240CM WRK CHN 2.8MM STD CAP W/ NDL MIC MESH

## (undated) DEVICE — FORCEP BX MESH TOOTH MIC 2.8 MMX240 CM NDL STRL RADIAL JAW 4

## (undated) DEVICE — BLOCK BITE 60FR RUBBER ADLT DENTAL